# Patient Record
Sex: MALE | Race: WHITE | Employment: FULL TIME | URBAN - METROPOLITAN AREA
[De-identification: names, ages, dates, MRNs, and addresses within clinical notes are randomized per-mention and may not be internally consistent; named-entity substitution may affect disease eponyms.]

---

## 2016-01-24 LAB
LEFT VENTRICULAR EJECTION FRACTION MODE: NORMAL
LV EF: 55 %

## 2017-05-15 PROBLEM — N40.1 BENIGN NON-NODULAR PROSTATIC HYPERPLASIA WITH LOWER URINARY TRACT SYMPTOMS: Status: ACTIVE | Noted: 2017-05-15

## 2017-05-15 PROBLEM — K21.9 GASTROESOPHAGEAL REFLUX DISEASE WITHOUT ESOPHAGITIS: Chronic | Status: ACTIVE | Noted: 2017-05-15

## 2017-05-15 PROBLEM — I25.10 CORONARY ARTERY DISEASE INVOLVING NATIVE CORONARY ARTERY OF NATIVE HEART WITHOUT ANGINA PECTORIS: Chronic | Status: ACTIVE | Noted: 2017-05-15

## 2017-05-15 PROBLEM — R35.0 URINARY FREQUENCY: Status: ACTIVE | Noted: 2017-05-15

## 2017-05-15 PROBLEM — I10 ESSENTIAL HYPERTENSION: Chronic | Status: ACTIVE | Noted: 2017-05-15

## 2017-05-15 PROBLEM — E78.2 MIXED HYPERLIPIDEMIA: Chronic | Status: ACTIVE | Noted: 2017-05-15

## 2017-05-15 PROBLEM — R35.0 URINARY FREQUENCY: Status: RESOLVED | Noted: 2017-05-15 | Resolved: 2017-05-15

## 2018-03-19 ENCOUNTER — HOSPITAL ENCOUNTER (OUTPATIENT)
Age: 83
Discharge: HOME | End: 2018-03-21
Payer: MEDICARE

## 2018-03-19 ENCOUNTER — OFFICE VISIT (OUTPATIENT)
Dept: FAMILY MEDICINE CLINIC | Age: 83
End: 2018-03-19
Payer: MEDICARE

## 2018-03-19 VITALS
TEMPERATURE: 98.3 F | SYSTOLIC BLOOD PRESSURE: 162 MMHG | BODY MASS INDEX: 25.94 KG/M2 | OXYGEN SATURATION: 93 % | HEIGHT: 66 IN | HEART RATE: 62 BPM | WEIGHT: 161.4 LBS | DIASTOLIC BLOOD PRESSURE: 71 MMHG

## 2018-03-19 DIAGNOSIS — E78.2 MIXED HYPERLIPIDEMIA: Chronic | ICD-10-CM

## 2018-03-19 DIAGNOSIS — I25.10 CORONARY ARTERY DISEASE INVOLVING NATIVE CORONARY ARTERY OF NATIVE HEART WITHOUT ANGINA PECTORIS: Primary | Chronic | ICD-10-CM

## 2018-03-19 DIAGNOSIS — I10 ESSENTIAL HYPERTENSION: Chronic | ICD-10-CM

## 2018-03-19 DIAGNOSIS — K21.9 GASTROESOPHAGEAL REFLUX DISEASE WITHOUT ESOPHAGITIS: Chronic | ICD-10-CM

## 2018-03-19 LAB
ANION GAP SERPL CALCULATED.3IONS-SCNC: 13 MMOL/L (ref 7–16)
BUN BLDV-MCNC: 22 MG/DL (ref 8–23)
CALCIUM SERPL-MCNC: 8.8 MG/DL (ref 8.6–10.2)
CHLORIDE BLD-SCNC: 103 MMOL/L (ref 98–107)
CO2: 24 MMOL/L (ref 22–29)
CREAT SERPL-MCNC: 1.3 MG/DL (ref 0.7–1.2)
GFR AFRICAN AMERICAN: >60
GFR NON-AFRICAN AMERICAN: 53 ML/MIN/1.73
GLUCOSE BLD-MCNC: 106 MG/DL (ref 74–109)
POTASSIUM SERPL-SCNC: 4.6 MMOL/L (ref 3.5–5)
SODIUM BLD-SCNC: 140 MMOL/L (ref 132–146)

## 2018-03-19 PROCEDURE — 99214 OFFICE O/P EST MOD 30 MIN: CPT | Performed by: FAMILY MEDICINE

## 2018-03-19 PROCEDURE — 99212 OFFICE O/P EST SF 10 MIN: CPT | Performed by: FAMILY MEDICINE

## 2018-03-19 PROCEDURE — 36415 COLL VENOUS BLD VENIPUNCTURE: CPT

## 2018-03-19 PROCEDURE — 80048 BASIC METABOLIC PNL TOTAL CA: CPT

## 2018-03-19 NOTE — PROGRESS NOTES
[facility]   Ambulatory visit note  DATE OF VISIT : 3/19/2018    Patient : Jaime Fish   Sex : male   Age : 80 y.o.  : 1935   MRN : <[id number]>            Chief Complaint :   Chief Complaint   Patient presents with    Coronary Artery Disease     f/u from        HPI:  Jaime Fish presents to the office today for evaluation of Hyperlipidemia, Coronary Artery Disease, HTN    Current status:      Symptoms related to above problems: No    Tolerating medication:   Yes   Patient does not report new complaints today. BP good at home. Occasional symptoms of GERD related to diet. Takes ranitidine BID    Past Medical History :  has a past medical history of Arthritis; CAD (coronary artery disease); Coronary artery disease involving native coronary artery of native heart without angina pectoris; GERD (gastroesophageal reflux disease); Hyperlipidemia; Hypertension; and Myocardial infarct, old. Past Surgical history :    Past Surgical History:   Procedure Laterality Date    APPENDECTOMY      CORONARY ANGIOPLASTY WITH STENT PLACEMENT Left     2 placed in  and one replacement after a couple of years due to stent clogging at [facility]       Family Medical History :   Family History   Problem Relation Age of Onset    Cancer Mother     High Blood Pressure Mother     Heart Disease Mother     High Cholesterol Mother     Cirrhosis Father        Social History :   Social History     Social History    Marital status:      Spouse name: N/A    Number of children: N/A    Years of education: N/A     Occupational History    Not on file.      Social History Main Topics    Smoking status: Never Smoker    Smokeless tobacco: Never Used    Alcohol use No    Drug use: No    Sexual activity: Not Currently     Other Topics Concern    Not on file     Social History Narrative    No narrative on file        Current Medications    Current Outpatient Prescriptions on File Prior to Visit   Medication Sig Dispense Refill    hydrochlorothiazide (HYDRODIURIL) 12.5 MG tablet Take 1 tablet by mouth daily 30 tablet 3    metoprolol (LOPRESSOR) 100 MG tablet Take 100 mg by mouth 2 times daily      benazepril (LOTENSIN) 20 MG tablet Take 20 mg by mouth 2 times daily      clopidogrel (PLAVIX) 75 MG tablet Take 75 mg by mouth daily      isosorbide mononitrate (IMDUR) 60 MG extended release tablet Take 60 mg by mouth daily      atorvastatin (LIPITOR) 40 MG tablet Take 40 mg by mouth daily      aspirin 81 MG tablet Take 81 mg by mouth daily      niacin 500 MG extended release capsule Take 500 mg by mouth nightly      Omega-3 Fatty Acids (FISH OIL) 500 MG CAPS Take 1 capsule by mouth 2 times daily      ranitidine (ZANTAC) 150 MG tablet Take 150 mg by mouth 2 times daily      diltiazem (CARDIZEM) 120 MG tablet Take 120 mg by mouth daily       No current facility-administered medications on file prior to visit. Allergies : No Known Allergies    Review of Systems :    Review of Systems - History obtained from the patient  General ROS: negative  Respiratory ROS: no cough, shortness of breath, or wheezing  Cardiovascular ROS: no chest pain or dyspnea on exertion  Gastrointestinal ROS: no abdominal pain, change in bowel habits, or black or bloody stools  Genito-Urinary ROS: no dysuria, trouble voiding, or hematuria  Neurological ROS: negative for - dizziness or headaches    Physical Exam :    VITAL SIGNS : Blood pressure (!) 162/71, pulse 62, temperature 98.3 °F (36.8 °C), temperature source Oral, height 5' 6\" (1.676 m), weight 161 lb 6.4 oz (73.2 kg), SpO2 93 %. GENERAL APPEARANCE : NAD, cooperative, appears stated age  NECK : Supple, trachea midline, no thyromegaly, full ROM  LUNGS : Clear to auscultation, without wheezes, rales, or rhonchi. Normal chest expansion.   HEART : RRR, normal S1/S2, no murmurs, gallops, or abnormal sounds  EXTREMITIES : No peripheral edema, no clubbing, no cyanosis  PSYCHIATRIC : Appropriate affect, AAO X 3         Assessment & Plan :    Hanny Claire was seen today for coronary artery disease. Diagnoses and all orders for this visit:    Coronary artery disease involving native coronary artery of native heart without angina pectoris    Gastroesophageal reflux disease without esophagitis    Essential hypertension  -     Basic Metabolic Panel; Future    Mixed hyperlipidemia        Health Maintenance     Health Maintenance Due   Topic Date Due    DTaP/Tdap/Td vaccine (1 - Tdap) 02/07/1954    Shingles Vaccine (1 of 2 - 2 Dose Series) 02/07/1985       Other plans: Continue all meds. He monitors BP and readings are generally normal.  I will hold off on increasing meds for now. He does have a cardiology appointment soon. Reviewed indicated health maintenance and answered questions as needed. Advised patient of risks of foregoing needed health maintenance. Patient advised of all findings and recommendations. Questions answered, and instructions provided where indicated, including reasons for contacting office or returning sooner than requested. Reviewed indications and potential side-effects of medications, including any new medications ordered today.     Future Appointments  Date Time Provider Department Center   6/14/2018 9:20 AM MD Dione Faust MD

## 2018-06-14 ENCOUNTER — HOSPITAL ENCOUNTER (OUTPATIENT)
Age: 83
Discharge: HOME | End: 2018-06-16
Payer: MEDICARE

## 2018-06-14 ENCOUNTER — OFFICE VISIT (OUTPATIENT)
Dept: FAMILY MEDICINE CLINIC | Age: 83
End: 2018-06-14
Payer: MEDICARE

## 2018-06-14 VITALS
DIASTOLIC BLOOD PRESSURE: 89 MMHG | HEART RATE: 62 BPM | WEIGHT: 159 LBS | BODY MASS INDEX: 25.55 KG/M2 | SYSTOLIC BLOOD PRESSURE: 184 MMHG | HEIGHT: 66 IN | TEMPERATURE: 98 F | OXYGEN SATURATION: 96 %

## 2018-06-14 DIAGNOSIS — I25.10 CORONARY ARTERY DISEASE INVOLVING NATIVE CORONARY ARTERY OF NATIVE HEART WITHOUT ANGINA PECTORIS: Primary | Chronic | ICD-10-CM

## 2018-06-14 DIAGNOSIS — I10 ESSENTIAL HYPERTENSION: Chronic | ICD-10-CM

## 2018-06-14 DIAGNOSIS — Z23 NEED FOR TDAP VACCINATION: ICD-10-CM

## 2018-06-14 DIAGNOSIS — E78.2 MIXED HYPERLIPIDEMIA: Chronic | ICD-10-CM

## 2018-06-14 DIAGNOSIS — Z23 NEED FOR ZOSTER VACCINATION: ICD-10-CM

## 2018-06-14 LAB
ALBUMIN SERPL-MCNC: 4.3 G/DL (ref 3.5–5.2)
ALP BLD-CCNC: 85 U/L (ref 40–129)
ALT SERPL-CCNC: 21 U/L (ref 0–40)
ANION GAP SERPL CALCULATED.3IONS-SCNC: 15 MMOL/L (ref 7–16)
AST SERPL-CCNC: 30 U/L (ref 0–39)
BILIRUB SERPL-MCNC: 0.7 MG/DL (ref 0–1.2)
BUN BLDV-MCNC: 23 MG/DL (ref 8–23)
CALCIUM SERPL-MCNC: 9.2 MG/DL (ref 8.6–10.2)
CHLORIDE BLD-SCNC: 104 MMOL/L (ref 98–107)
CHOLESTEROL, TOTAL: 162 MG/DL (ref 0–199)
CO2: 24 MMOL/L (ref 22–29)
CREAT SERPL-MCNC: 1.3 MG/DL (ref 0.7–1.2)
GFR AFRICAN AMERICAN: >60
GFR NON-AFRICAN AMERICAN: 53 ML/MIN/1.73
GLUCOSE BLD-MCNC: 105 MG/DL (ref 74–109)
HDLC SERPL-MCNC: 48 MG/DL
LDL CHOLESTEROL CALCULATED: 86 MG/DL (ref 0–99)
POTASSIUM SERPL-SCNC: 4.6 MMOL/L (ref 3.5–5)
SODIUM BLD-SCNC: 143 MMOL/L (ref 132–146)
TOTAL PROTEIN: 7 G/DL (ref 6.4–8.3)
TRIGL SERPL-MCNC: 142 MG/DL (ref 0–149)
VLDLC SERPL CALC-MCNC: 28 MG/DL

## 2018-06-14 PROCEDURE — 99214 OFFICE O/P EST MOD 30 MIN: CPT | Performed by: FAMILY MEDICINE

## 2018-06-14 PROCEDURE — 80061 LIPID PANEL: CPT

## 2018-06-14 PROCEDURE — 36415 COLL VENOUS BLD VENIPUNCTURE: CPT | Performed by: FAMILY MEDICINE

## 2018-06-14 PROCEDURE — 80053 COMPREHEN METABOLIC PANEL: CPT

## 2018-07-02 ENCOUNTER — OFFICE VISIT (OUTPATIENT)
Dept: CARDIOLOGY CLINIC | Age: 83
End: 2018-07-02
Payer: MEDICARE

## 2018-07-02 VITALS
RESPIRATION RATE: 16 BRPM | BODY MASS INDEX: 25.23 KG/M2 | HEART RATE: 58 BPM | SYSTOLIC BLOOD PRESSURE: 130 MMHG | HEIGHT: 66 IN | WEIGHT: 157 LBS | DIASTOLIC BLOOD PRESSURE: 60 MMHG

## 2018-07-02 DIAGNOSIS — I25.10 CORONARY ARTERY DISEASE INVOLVING NATIVE CORONARY ARTERY OF NATIVE HEART WITHOUT ANGINA PECTORIS: Primary | ICD-10-CM

## 2018-07-02 DIAGNOSIS — I10 ESSENTIAL HYPERTENSION: ICD-10-CM

## 2018-07-02 DIAGNOSIS — E78.00 PURE HYPERCHOLESTEROLEMIA: ICD-10-CM

## 2018-07-02 DIAGNOSIS — I44.0 FIRST-DEGREE ATRIOVENTRICULAR BLOCK: ICD-10-CM

## 2018-07-02 PROCEDURE — 93000 ELECTROCARDIOGRAM COMPLETE: CPT | Performed by: INTERNAL MEDICINE

## 2018-07-02 PROCEDURE — 99205 OFFICE O/P NEW HI 60 MIN: CPT | Performed by: INTERNAL MEDICINE

## 2018-07-02 RX ORDER — BENAZEPRIL HYDROCHLORIDE 20 MG/1
20 TABLET ORAL 2 TIMES DAILY
Qty: 90 TABLET | Refills: 3 | Status: SHIPPED | OUTPATIENT
Start: 2018-07-02 | End: 2018-10-08 | Stop reason: SDUPTHER

## 2018-07-02 NOTE — PATIENT INSTRUCTIONS
trans fat  · Read food labels, and try to avoid saturated and trans fats. They increase your risk of heart disease. Trans fat is found in many processed foods such as cookies and crackers. · Use olive or canola oil when you cook. Try cholesterol-lowering spreads, such as Benecol or Take Control. · Bake, broil, grill, or steam foods instead of frying them. · Choose lean meats instead of high-fat meats such as hot dogs and sausages. Cut off all visible fat when you prepare meat. · Eat fish, skinless poultry, and meat alternatives such as soy products instead of high-fat meats. Soy products, such as tofu, may be especially good for your heart. · Choose low-fat or fat-free milk and dairy products. Eat fish  · Eat at least two servings of fish a week. Certain fish, such as salmon and tuna, contain omega-3 fatty acids, which may help reduce your risk of heart attack. Eat foods high in fiber  · Eat a variety of grain products every day. Include whole-grain foods that have lots of fiber and nutrients. Examples of whole-grain foods include oats, whole wheat bread, and brown rice. · Buy whole-grain breads and cereals, instead of white bread or pastries. Limit salt and sodium  · Limit how much salt and sodium you eat to help lower your blood pressure. · Taste food before you salt it. Add only a little salt when you think you need it. With time, your taste buds will adjust to less salt. · Eat fewer snack items, fast foods, and other high-salt, processed foods. Check food labels for the amount of sodium in packaged foods. · Choose low-sodium versions of canned goods (such as soups, vegetables, and beans). Limit sugar  · Limit drinks and foods with added sugar. These include candy, desserts, and soda pop. Limit alcohol  · Limit alcohol to no more than 2 drinks a day for men and 1 drink a day for women. Too much alcohol can cause health problems. When should you call for help?   Watch closely for changes in your [employer], and be sure to contact your doctor if:    · You would like help planning heart-healthy meals. Where can you learn more? Go to [email or web address] and sign in to your [employer] account. Enter V137 in the [employer] box to learn more about \"Heart-Healthy Diet: Care Instructions. \"     If you do not have an account, please click on the \"Sign Up Now\" link. Current as of: December 6, 2017  Content Version: 11.6  © 6965-4001 [employer]. Care instructions adapted under license by [facility] ([facility]). If you have questions about a medical condition or this instruction, always ask your healthcare professional. [address] any warranty or liability for your use of this information.

## 2018-07-23 RX ORDER — RANITIDINE 150 MG/1
150 TABLET ORAL 2 TIMES DAILY
Qty: 60 TABLET | Refills: 2 | Status: SHIPPED | OUTPATIENT
Start: 2018-07-23 | End: 2018-10-08 | Stop reason: SDUPTHER

## 2018-08-20 ENCOUNTER — OFFICE VISIT (OUTPATIENT)
Dept: FAMILY MEDICINE CLINIC | Age: 83
End: 2018-08-20
Payer: MEDICARE

## 2018-08-20 VITALS
DIASTOLIC BLOOD PRESSURE: 63 MMHG | OXYGEN SATURATION: 95 % | HEIGHT: 66 IN | SYSTOLIC BLOOD PRESSURE: 126 MMHG | BODY MASS INDEX: 25.07 KG/M2 | TEMPERATURE: 98.7 F | HEART RATE: 50 BPM | WEIGHT: 156 LBS | RESPIRATION RATE: 16 BRPM

## 2018-08-20 DIAGNOSIS — I10 ESSENTIAL HYPERTENSION: Chronic | ICD-10-CM

## 2018-08-20 PROCEDURE — 99213 OFFICE O/P EST LOW 20 MIN: CPT | Performed by: FAMILY MEDICINE

## 2018-08-20 PROCEDURE — 99212 OFFICE O/P EST SF 10 MIN: CPT | Performed by: FAMILY MEDICINE

## 2018-08-20 RX ORDER — OMEGA-3/DHA/EPA/FISH OIL 60 MG-90MG
1 CAPSULE ORAL 2 TIMES DAILY
COMMUNITY
End: 2020-06-08

## 2018-08-20 ASSESSMENT — PATIENT HEALTH QUESTIONNAIRE - PHQ9
SUM OF ALL RESPONSES TO PHQ QUESTIONS 1-9: 0
SUM OF ALL RESPONSES TO PHQ9 QUESTIONS 1 & 2: 0
2. FEELING DOWN, DEPRESSED OR HOPELESS: 0
SUM OF ALL RESPONSES TO PHQ QUESTIONS 1-9: 0
1. LITTLE INTEREST OR PLEASURE IN DOING THINGS: 0

## 2018-08-20 NOTE — PROGRESS NOTES
S: 80 y.o. male here for fatigue. Not feeling like himself for the past 2 wks. More HAs. Bandlike and occipital. Dizziness, and BP and HR low at these times. Cards in July seen and EKG showed 1st deg AV block w/ rashmi. Not drinking much water. No new stressors aside from wife not wanting intercourse lately. Daughter w/ breast cancer. BPs w/ home BP cuff may read 10 points higher systolic. On cardizem. Previous Dx of vertigo but none recently. Some nausea, no vomiting. No fever or diarrhea. Otherwise neg ROS. O: VS: /63 Comment: home BP cuff  Pulse 50   Temp 98.7 °F (37.1 °C) (Oral)   Resp 16   Ht 5' 6\" (1.676 m)   Wt 156 lb (70.8 kg)   SpO2 95%   BMI 25.18 kg/m²    General: NAD, alert and interacting appropriately. CV:  RRR, no gallops, rubs, or murmurs    Resp: CTAB    Abd:  Soft, nontender   Ext:  No edema    Impression: fatigue 2/2 depression vs dehydration vs bradycardia  Plan:   Increase hydration  Stop cardizem given rashmi and heart block and sxs  rtc 10-14 days for full depression screening    Attending Physician Statement  I have discussed the case, including pertinent history and exam findings with the resident. I agree with the documented assessment and plan.

## 2018-08-22 ASSESSMENT — ENCOUNTER SYMPTOMS
COUGH: 0
ABDOMINAL PAIN: 0
VOMITING: 0
SHORTNESS OF BREATH: 0
CONSTIPATION: 0
SORE THROAT: 0
NAUSEA: 1
SINUS PAIN: 0
DIARRHEA: 0

## 2018-08-23 NOTE — PROGRESS NOTES
CATHETERIZATION  02/05/2016    patent stents, LM 20% ostial, Cx luminal irregularities--medical management    CARDIOVASCULAR STRESS TEST  10/03/2011    nuclear    CARDIOVASCULAR STRESS TEST  01/25/2016    CORONARY ANGIOPLASTY WITH STENT PLACEMENT Left 1999    stent to RCA  @ [facility]    TRANSTHORACIC ECHOCARDIOGRAM  01/24/2016    EF 55%, 1+TR & 1+ MR       Allergies:    Patient has no known allergies. Social History:   Social History     Social History    Marital status:      Spouse name: N/A    Number of children: N/A    Years of education: N/A     Occupational History    Not on file. Social History Main Topics    Smoking status: Never Smoker    Smokeless tobacco: Never Used    Alcohol use No    Drug use: No    Sexual activity: Not Currently     Other Topics Concern    Not on file     Social History Narrative    Denies caffeine        Family History:   Family History   Problem Relation Age of Onset    Cancer Mother     High Blood Pressure Mother     High Cholesterol Mother     Cirrhosis Father        Review of Systems:   Review of Systems   Constitutional: Negative for chills and fever. HENT: Negative for congestion, ear discharge, ear pain, sinus pain and sore throat. Respiratory: Negative for cough and shortness of breath. Cardiovascular: Negative for chest pain, palpitations and leg swelling. Gastrointestinal: Positive for nausea. Negative for abdominal pain, constipation, diarrhea and vomiting. Genitourinary: Negative for dysuria and urgency. Neurological: Positive for dizziness and headaches. Negative for loss of consciousness. Psychiatric/Behavioral: Negative for depression and suicidal ideas.        Physical Exam   Vitals: /63 Comment: home BP cuff  Pulse 50   Temp 98.7 °F (37.1 °C) (Oral)   Resp 16   Ht 5' 6\" (1.676 m)   Wt 156 lb (70.8 kg)   SpO2 95%   BMI 25.18 kg/m²   General Appearance: Well developed, awake, alert, oriented, no acute tablet Take 60 mg by mouth daily      atorvastatin (LIPITOR) 40 MG tablet Take 40 mg by mouth daily      aspirin 81 MG tablet Take 81 mg by mouth daily       No current facility-administered medications for this visit.

## 2018-08-29 ENCOUNTER — OFFICE VISIT (OUTPATIENT)
Dept: FAMILY MEDICINE CLINIC | Age: 83
End: 2018-08-29
Payer: MEDICARE

## 2018-08-29 VITALS
OXYGEN SATURATION: 94 % | WEIGHT: 157 LBS | HEART RATE: 61 BPM | SYSTOLIC BLOOD PRESSURE: 151 MMHG | TEMPERATURE: 98.5 F | BODY MASS INDEX: 25.23 KG/M2 | HEIGHT: 66 IN | DIASTOLIC BLOOD PRESSURE: 67 MMHG

## 2018-08-29 DIAGNOSIS — N39.41 URGE INCONTINENCE OF URINE: Primary | ICD-10-CM

## 2018-08-29 PROCEDURE — 99213 OFFICE O/P EST LOW 20 MIN: CPT | Performed by: FAMILY MEDICINE

## 2018-08-29 PROCEDURE — 99212 OFFICE O/P EST SF 10 MIN: CPT | Performed by: FAMILY MEDICINE

## 2018-08-29 NOTE — PATIENT INSTRUCTIONS
under license by [facility] ([facility]). If you have questions about a medical condition or this instruction, always ask your healthcare professional. [place] [address] any warranty or liability for your use of this information. STOP DRINKING FLUIDS 2-3 HOURS BEFORE BED. AVOID DRINKING JUICE, POP, COFFEE OR TEA BEFORE BED.

## 2018-09-03 ASSESSMENT — ENCOUNTER SYMPTOMS
COUGH: 0
ABDOMINAL PAIN: 0
VOMITING: 0
SHORTNESS OF BREATH: 0
NAUSEA: 0
BLURRED VISION: 0
DOUBLE VISION: 0
PHOTOPHOBIA: 0

## 2018-09-04 RX ORDER — METOPROLOL TARTRATE 100 MG/1
100 TABLET ORAL 2 TIMES DAILY
Qty: 60 TABLET | Refills: 5 | Status: SHIPPED | OUTPATIENT
Start: 2018-09-04 | End: 2019-01-17 | Stop reason: SDUPTHER

## 2018-09-14 ENCOUNTER — HOSPITAL ENCOUNTER (OUTPATIENT)
Dept: ULTRASOUND IMAGING | Age: 83
Discharge: HOME | End: 2018-09-16
Payer: MEDICARE

## 2018-09-14 DIAGNOSIS — N39.41 URGE INCONTINENCE OF URINE: ICD-10-CM

## 2018-09-14 PROCEDURE — 76775 US EXAM ABDO BACK WALL LIM: CPT

## 2018-09-19 ENCOUNTER — TELEPHONE (OUTPATIENT)
Dept: FAMILY MEDICINE CLINIC | Age: 83
End: 2018-09-19

## 2018-10-08 ENCOUNTER — OFFICE VISIT (OUTPATIENT)
Dept: FAMILY MEDICINE CLINIC | Age: 83
End: 2018-10-08
Payer: MEDICARE

## 2018-10-08 VITALS
TEMPERATURE: 98.4 F | DIASTOLIC BLOOD PRESSURE: 88 MMHG | SYSTOLIC BLOOD PRESSURE: 158 MMHG | OXYGEN SATURATION: 95 % | HEART RATE: 60 BPM | WEIGHT: 156 LBS | RESPIRATION RATE: 16 BRPM | BODY MASS INDEX: 25.07 KG/M2 | HEIGHT: 66 IN

## 2018-10-08 DIAGNOSIS — Z23 NEED FOR PNEUMOCOCCAL VACCINATION: Primary | ICD-10-CM

## 2018-10-08 DIAGNOSIS — K21.9 GASTROESOPHAGEAL REFLUX DISEASE WITHOUT ESOPHAGITIS: Chronic | ICD-10-CM

## 2018-10-08 DIAGNOSIS — I10 ESSENTIAL HYPERTENSION: Chronic | ICD-10-CM

## 2018-10-08 DIAGNOSIS — E78.2 MIXED HYPERLIPIDEMIA: Chronic | ICD-10-CM

## 2018-10-08 DIAGNOSIS — I25.10 CORONARY ARTERY DISEASE INVOLVING NATIVE CORONARY ARTERY OF NATIVE HEART WITHOUT ANGINA PECTORIS: Chronic | ICD-10-CM

## 2018-10-08 PROCEDURE — 99212 OFFICE O/P EST SF 10 MIN: CPT | Performed by: FAMILY MEDICINE

## 2018-10-08 PROCEDURE — 90732 PPSV23 VACC 2 YRS+ SUBQ/IM: CPT

## 2018-10-08 PROCEDURE — G0009 ADMIN PNEUMOCOCCAL VACCINE: HCPCS

## 2018-10-08 PROCEDURE — 99214 OFFICE O/P EST MOD 30 MIN: CPT | Performed by: FAMILY MEDICINE

## 2018-10-08 PROCEDURE — 6360000002 HC RX W HCPCS

## 2018-10-08 RX ORDER — ATORVASTATIN CALCIUM 40 MG/1
40 TABLET, FILM COATED ORAL DAILY
Qty: 90 TABLET | Refills: 3 | Status: SHIPPED | OUTPATIENT
Start: 2018-10-08 | End: 2019-09-19 | Stop reason: SDUPTHER

## 2018-10-08 RX ORDER — BENAZEPRIL HYDROCHLORIDE 20 MG/1
20 TABLET ORAL 2 TIMES DAILY
Qty: 180 TABLET | Refills: 3 | Status: SHIPPED | OUTPATIENT
Start: 2018-10-08 | End: 2019-01-17 | Stop reason: SDUPTHER

## 2018-10-08 RX ORDER — ISOSORBIDE MONONITRATE 60 MG/1
60 TABLET, EXTENDED RELEASE ORAL DAILY
Qty: 90 TABLET | Refills: 3 | Status: SHIPPED | OUTPATIENT
Start: 2018-10-08 | End: 2019-09-19 | Stop reason: SDUPTHER

## 2018-10-08 RX ORDER — RANITIDINE 150 MG/1
150 TABLET ORAL 2 TIMES DAILY
Qty: 180 TABLET | Refills: 3 | Status: SHIPPED | OUTPATIENT
Start: 2018-10-08 | End: 2019-09-19 | Stop reason: SDUPTHER

## 2018-12-10 ENCOUNTER — TELEPHONE (OUTPATIENT)
Dept: ADMINISTRATIVE | Age: 83
End: 2018-12-10

## 2018-12-13 ENCOUNTER — HOSPITAL ENCOUNTER (OUTPATIENT)
Age: 83
Discharge: HOME | End: 2018-12-15
Payer: MEDICARE

## 2018-12-13 ENCOUNTER — OFFICE VISIT (OUTPATIENT)
Dept: FAMILY MEDICINE CLINIC | Age: 83
End: 2018-12-13
Payer: MEDICARE

## 2018-12-13 VITALS
TEMPERATURE: 98.6 F | DIASTOLIC BLOOD PRESSURE: 100 MMHG | WEIGHT: 157 LBS | HEART RATE: 66 BPM | RESPIRATION RATE: 20 BRPM | BODY MASS INDEX: 26.16 KG/M2 | SYSTOLIC BLOOD PRESSURE: 191 MMHG | HEIGHT: 65 IN

## 2018-12-13 DIAGNOSIS — I10 ESSENTIAL HYPERTENSION: Primary | Chronic | ICD-10-CM

## 2018-12-13 DIAGNOSIS — I10 ESSENTIAL HYPERTENSION: Chronic | ICD-10-CM

## 2018-12-13 DIAGNOSIS — I25.10 CORONARY ARTERY DISEASE INVOLVING NATIVE CORONARY ARTERY OF NATIVE HEART WITHOUT ANGINA PECTORIS: Chronic | ICD-10-CM

## 2018-12-13 LAB
ALBUMIN SERPL-MCNC: 4.7 G/DL (ref 3.5–5.2)
ALP BLD-CCNC: 84 U/L (ref 40–129)
ALT SERPL-CCNC: 21 U/L (ref 0–40)
ANION GAP SERPL CALCULATED.3IONS-SCNC: 9 MMOL/L (ref 7–16)
AST SERPL-CCNC: 25 U/L (ref 0–39)
BILIRUB SERPL-MCNC: 0.9 MG/DL (ref 0–1.2)
BUN BLDV-MCNC: 22 MG/DL (ref 8–23)
CALCIUM SERPL-MCNC: 9.5 MG/DL (ref 8.6–10.2)
CHLORIDE BLD-SCNC: 100 MMOL/L (ref 98–107)
CO2: 29 MMOL/L (ref 22–29)
CREAT SERPL-MCNC: 1.3 MG/DL (ref 0.7–1.2)
GFR AFRICAN AMERICAN: >60
GFR NON-AFRICAN AMERICAN: 53 ML/MIN/1.73
GLUCOSE BLD-MCNC: 90 MG/DL (ref 74–99)
POTASSIUM SERPL-SCNC: 5 MMOL/L (ref 3.5–5)
SODIUM BLD-SCNC: 138 MMOL/L (ref 132–146)
TOTAL PROTEIN: 7.4 G/DL (ref 6.4–8.3)

## 2018-12-13 PROCEDURE — 36415 COLL VENOUS BLD VENIPUNCTURE: CPT | Performed by: FAMILY MEDICINE

## 2018-12-13 PROCEDURE — 80053 COMPREHEN METABOLIC PANEL: CPT

## 2018-12-13 PROCEDURE — 99213 OFFICE O/P EST LOW 20 MIN: CPT | Performed by: FAMILY MEDICINE

## 2018-12-13 PROCEDURE — 99212 OFFICE O/P EST SF 10 MIN: CPT | Performed by: FAMILY MEDICINE

## 2018-12-13 PROCEDURE — 36415 COLL VENOUS BLD VENIPUNCTURE: CPT

## 2018-12-13 RX ORDER — HYDROCHLOROTHIAZIDE 12.5 MG/1
12.5 CAPSULE, GELATIN COATED ORAL EVERY MORNING
Qty: 30 CAPSULE | Refills: 1 | Status: SHIPPED | OUTPATIENT
Start: 2018-12-13 | End: 2019-01-17 | Stop reason: SDUPTHER

## 2018-12-13 NOTE — PROGRESS NOTES
chest expansion. HEART : RRR, normal S1/S2, no murmurs, gallops, or abnormal sounds  EXTREMITIES : No peripheral edema, no clubbing, no cyanosis  SKIN : Warm and dry, no rash or abnormal skin lesions noted  PSYCHIATRIC : Appropriate affect, AAO X 3         Assessment & Plan :    Bianca Flores was seen today for hypertension. Diagnoses and all orders for this visit:    Essential hypertension  -     Comprehensive Metabolic Panel; Future  -     hydrochlorothiazide (MICROZIDE) 12.5 MG capsule; Take 1 capsule by mouth every morning    Coronary artery disease involving native coronary artery of native heart without angina pectoris        Health Maintenance     Health Maintenance Due   Topic Date Due    Shingles Vaccine (1 of 2 - 2 Dose Series) 12/15/2016       Other plans: Will try low dose HCTZ. Warned of potential SE's   Continue to monitor at home and recheck in 1 month   Consider retrial with amlodipine if not effective    Reviewed indicated health maintenance and answered questions as needed. Advised patient of risks of foregoing needed health maintenance. Patient advised of all findings and recommendations. Questions answered, and instructions provided where indicated, including reasons for contacting office or returning sooner than requested. Reviewed indications and potential side-effects of medications, including any new medications ordered today.     Future Appointments  Date Time Provider Department Center   1/17/2019 9:20 AM MD Nadir Lacey MD

## 2019-01-17 ENCOUNTER — HOSPITAL ENCOUNTER (OUTPATIENT)
Age: 84
Discharge: HOME | End: 2019-01-19
Payer: MEDICARE

## 2019-01-17 ENCOUNTER — OFFICE VISIT (OUTPATIENT)
Dept: FAMILY MEDICINE CLINIC | Age: 84
End: 2019-01-17
Payer: MEDICARE

## 2019-01-17 VITALS
DIASTOLIC BLOOD PRESSURE: 67 MMHG | HEART RATE: 61 BPM | BODY MASS INDEX: 25.07 KG/M2 | HEIGHT: 66 IN | TEMPERATURE: 98 F | SYSTOLIC BLOOD PRESSURE: 125 MMHG | OXYGEN SATURATION: 95 % | WEIGHT: 156 LBS

## 2019-01-17 DIAGNOSIS — I73.00 RAYNAUD'S DISEASE WITHOUT GANGRENE: ICD-10-CM

## 2019-01-17 DIAGNOSIS — I10 ESSENTIAL HYPERTENSION: Chronic | ICD-10-CM

## 2019-01-17 DIAGNOSIS — Z23 NEED FOR SHINGLES VACCINE: Primary | ICD-10-CM

## 2019-01-17 LAB
ANION GAP SERPL CALCULATED.3IONS-SCNC: 11 MMOL/L (ref 7–16)
BUN BLDV-MCNC: 23 MG/DL (ref 8–23)
CALCIUM SERPL-MCNC: 9.2 MG/DL (ref 8.6–10.2)
CHLORIDE BLD-SCNC: 102 MMOL/L (ref 98–107)
CO2: 28 MMOL/L (ref 22–29)
CREAT SERPL-MCNC: 1.4 MG/DL (ref 0.7–1.2)
GFR AFRICAN AMERICAN: 58
GFR NON-AFRICAN AMERICAN: 48 ML/MIN/1.73
GLUCOSE BLD-MCNC: 120 MG/DL (ref 74–99)
POTASSIUM SERPL-SCNC: 4.8 MMOL/L (ref 3.5–5)
SODIUM BLD-SCNC: 141 MMOL/L (ref 132–146)

## 2019-01-17 PROCEDURE — 36415 COLL VENOUS BLD VENIPUNCTURE: CPT

## 2019-01-17 PROCEDURE — 36415 COLL VENOUS BLD VENIPUNCTURE: CPT | Performed by: FAMILY MEDICINE

## 2019-01-17 PROCEDURE — 99212 OFFICE O/P EST SF 10 MIN: CPT | Performed by: FAMILY MEDICINE

## 2019-01-17 PROCEDURE — 80048 BASIC METABOLIC PNL TOTAL CA: CPT

## 2019-01-17 PROCEDURE — 99213 OFFICE O/P EST LOW 20 MIN: CPT | Performed by: FAMILY MEDICINE

## 2019-01-17 RX ORDER — HYDROCHLOROTHIAZIDE 12.5 MG/1
12.5 CAPSULE, GELATIN COATED ORAL EVERY MORNING
Qty: 90 CAPSULE | Refills: 3 | Status: SHIPPED | OUTPATIENT
Start: 2019-01-17 | End: 2019-09-19 | Stop reason: SDUPTHER

## 2019-01-17 RX ORDER — METOPROLOL TARTRATE 100 MG/1
100 TABLET ORAL 2 TIMES DAILY
Qty: 180 TABLET | Refills: 3 | Status: SHIPPED | OUTPATIENT
Start: 2019-01-17 | End: 2019-09-19 | Stop reason: SDUPTHER

## 2019-01-17 RX ORDER — BENAZEPRIL HYDROCHLORIDE 20 MG/1
20 TABLET ORAL 2 TIMES DAILY
Qty: 180 TABLET | Refills: 3 | Status: SHIPPED | OUTPATIENT
Start: 2019-01-17 | End: 2019-09-19 | Stop reason: SDUPTHER

## 2019-02-05 ENCOUNTER — OFFICE VISIT (OUTPATIENT)
Dept: FAMILY MEDICINE CLINIC | Age: 84
End: 2019-02-05
Payer: MEDICARE

## 2019-02-05 VITALS
OXYGEN SATURATION: 96 % | DIASTOLIC BLOOD PRESSURE: 77 MMHG | HEART RATE: 75 BPM | HEIGHT: 66 IN | SYSTOLIC BLOOD PRESSURE: 135 MMHG | WEIGHT: 156.5 LBS | BODY MASS INDEX: 25.15 KG/M2 | RESPIRATION RATE: 16 BRPM | TEMPERATURE: 98.1 F

## 2019-02-05 DIAGNOSIS — J06.9 VIRAL URI: Primary | ICD-10-CM

## 2019-02-05 PROCEDURE — 99213 OFFICE O/P EST LOW 20 MIN: CPT | Performed by: STUDENT IN AN ORGANIZED HEALTH CARE EDUCATION/TRAINING PROGRAM

## 2019-02-05 PROCEDURE — 99212 OFFICE O/P EST SF 10 MIN: CPT | Performed by: STUDENT IN AN ORGANIZED HEALTH CARE EDUCATION/TRAINING PROGRAM

## 2019-02-05 RX ORDER — FLUTICASONE PROPIONATE 50 MCG
2 SPRAY, SUSPENSION (ML) NASAL DAILY
Qty: 1 BOTTLE | Refills: 0 | Status: SHIPPED
Start: 2019-02-05 | End: 2020-06-08

## 2019-02-05 ASSESSMENT — ENCOUNTER SYMPTOMS
SINUS PAIN: 0
EYE DISCHARGE: 0
SINUS PRESSURE: 0
EYE REDNESS: 0
VOMITING: 0
SORE THROAT: 1
COUGH: 0
RHINORRHEA: 0
EYE ITCHING: 0
NAUSEA: 0
DIARRHEA: 0
SHORTNESS OF BREATH: 0

## 2019-07-02 ENCOUNTER — OFFICE VISIT (OUTPATIENT)
Dept: CARDIOLOGY CLINIC | Age: 84
End: 2019-07-02
Payer: MEDICARE

## 2019-07-02 VITALS
HEIGHT: 66 IN | WEIGHT: 155.6 LBS | SYSTOLIC BLOOD PRESSURE: 130 MMHG | HEART RATE: 58 BPM | RESPIRATION RATE: 16 BRPM | DIASTOLIC BLOOD PRESSURE: 60 MMHG | BODY MASS INDEX: 25.01 KG/M2

## 2019-07-02 DIAGNOSIS — E78.00 PURE HYPERCHOLESTEROLEMIA: ICD-10-CM

## 2019-07-02 DIAGNOSIS — I25.10 CORONARY ARTERY DISEASE INVOLVING NATIVE CORONARY ARTERY OF NATIVE HEART WITHOUT ANGINA PECTORIS: Primary | ICD-10-CM

## 2019-07-02 DIAGNOSIS — I10 ESSENTIAL HYPERTENSION: Chronic | ICD-10-CM

## 2019-07-02 DIAGNOSIS — I44.0 FIRST-DEGREE ATRIOVENTRICULAR BLOCK: ICD-10-CM

## 2019-07-02 PROCEDURE — 99214 OFFICE O/P EST MOD 30 MIN: CPT | Performed by: INTERNAL MEDICINE

## 2019-07-02 PROCEDURE — 93000 ELECTROCARDIOGRAM COMPLETE: CPT | Performed by: INTERNAL MEDICINE

## 2019-07-02 SDOH — HEALTH STABILITY: MENTAL HEALTH: HOW OFTEN DO YOU HAVE A DRINK CONTAINING ALCOHOL?: NEVER

## 2019-07-02 NOTE — PROGRESS NOTES
01/25/2016    CORONARY ANGIOPLASTY WITH STENT PLACEMENT Left 1999    stent to RCA  @ [facility]    TRANSTHORACIC ECHOCARDIOGRAM  01/24/2016    EF 55%, 1+TR & 1+ MR       Family History:  Family History   Problem Relation Age of Onset    Cancer Mother     High Blood Pressure Mother     High Cholesterol Mother     Cirrhosis Father        Social History:  Social History     Socioeconomic History    Marital status:      Spouse name: Not on file    Number of children: Not on file    Years of education: Not on file    Highest education level: Not on file   Occupational History    Not on file   Social Needs    Financial resource strain: Not on file    Food insecurity:     Worry: Not on file     Inability: Not on file    Transportation needs:     Medical: Not on file     Non-medical: Not on file   Tobacco Use    Smoking status: Never Smoker    Smokeless tobacco: Never Used   Substance and Sexual Activity    Alcohol use: Never     Frequency: Never    Drug use: Never    Sexual activity: Not Currently     Partners: Female   Lifestyle    Physical activity:     Days per week: Not on file     Minutes per session: Not on file    Stress: Not on file   Relationships    Social connections:     Talks on phone: Not on file     Gets together: Not on file     Attends Alevism service: Not on file     Active member of club or organization: Not on file     Attends meetings of clubs or organizations: Not on file     Relationship status: Not on file    Intimate partner violence:     Fear of current or ex partner: Not on file     Emotionally abused: Not on file     Physically abused: Not on file     Forced sexual activity: Not on file   Other Topics Concern    Not on file   Social History Narrative    Drinks 1 cup of coffee daily.         Allergies:  No Known Allergies    Current Medications:  Current Outpatient Medications   Medication Sig Dispense Refill    fluticasone (FLONASE) 50 MCG/ACT nasal spray 2 sprays by

## 2019-09-19 ENCOUNTER — HOSPITAL ENCOUNTER (OUTPATIENT)
Age: 84
Discharge: HOME | End: 2019-09-21
Payer: MEDICARE

## 2019-09-19 ENCOUNTER — OFFICE VISIT (OUTPATIENT)
Dept: FAMILY MEDICINE CLINIC | Age: 84
End: 2019-09-19
Payer: MEDICARE

## 2019-09-19 VITALS
RESPIRATION RATE: 16 BRPM | WEIGHT: 156 LBS | TEMPERATURE: 98.2 F | HEIGHT: 65 IN | OXYGEN SATURATION: 96 % | DIASTOLIC BLOOD PRESSURE: 71 MMHG | BODY MASS INDEX: 25.99 KG/M2 | HEART RATE: 59 BPM | SYSTOLIC BLOOD PRESSURE: 131 MMHG

## 2019-09-19 DIAGNOSIS — Z23 NEEDS FLU SHOT: ICD-10-CM

## 2019-09-19 DIAGNOSIS — I10 ESSENTIAL HYPERTENSION: Chronic | ICD-10-CM

## 2019-09-19 DIAGNOSIS — I25.10 CORONARY ARTERY DISEASE INVOLVING NATIVE CORONARY ARTERY OF NATIVE HEART WITHOUT ANGINA PECTORIS: ICD-10-CM

## 2019-09-19 DIAGNOSIS — Z00.00 ROUTINE GENERAL MEDICAL EXAMINATION AT A HEALTH CARE FACILITY: ICD-10-CM

## 2019-09-19 DIAGNOSIS — E78.5 HYPERLIPIDEMIA, UNSPECIFIED HYPERLIPIDEMIA TYPE: ICD-10-CM

## 2019-09-19 LAB
ANION GAP SERPL CALCULATED.3IONS-SCNC: 12 MMOL/L (ref 7–16)
BUN BLDV-MCNC: 26 MG/DL (ref 8–23)
CALCIUM SERPL-MCNC: 9 MG/DL (ref 8.6–10.2)
CHLORIDE BLD-SCNC: 101 MMOL/L (ref 98–107)
CO2: 27 MMOL/L (ref 22–29)
CREAT SERPL-MCNC: 1.3 MG/DL (ref 0.7–1.2)
GFR AFRICAN AMERICAN: >60
GFR NON-AFRICAN AMERICAN: 53 ML/MIN/1.73
GLUCOSE BLD-MCNC: 133 MG/DL (ref 74–99)
POTASSIUM SERPL-SCNC: 4.6 MMOL/L (ref 3.5–5)
SODIUM BLD-SCNC: 140 MMOL/L (ref 132–146)

## 2019-09-19 PROCEDURE — 80048 BASIC METABOLIC PNL TOTAL CA: CPT

## 2019-09-19 PROCEDURE — 36415 COLL VENOUS BLD VENIPUNCTURE: CPT

## 2019-09-19 PROCEDURE — G0438 PPPS, INITIAL VISIT: HCPCS | Performed by: FAMILY MEDICINE

## 2019-09-19 PROCEDURE — 36415 COLL VENOUS BLD VENIPUNCTURE: CPT | Performed by: FAMILY MEDICINE

## 2019-09-19 PROCEDURE — 99212 OFFICE O/P EST SF 10 MIN: CPT | Performed by: FAMILY MEDICINE

## 2019-09-19 RX ORDER — BENAZEPRIL HYDROCHLORIDE 20 MG/1
20 TABLET ORAL 2 TIMES DAILY
Qty: 180 TABLET | Refills: 3 | Status: SHIPPED
Start: 2019-09-19 | End: 2020-10-12 | Stop reason: SDUPTHER

## 2019-09-19 RX ORDER — HYDROCHLOROTHIAZIDE 12.5 MG/1
12.5 CAPSULE, GELATIN COATED ORAL EVERY MORNING
Qty: 90 CAPSULE | Refills: 3 | Status: SHIPPED
Start: 2019-09-19 | End: 2020-02-10 | Stop reason: SDUPTHER

## 2019-09-19 RX ORDER — METOPROLOL TARTRATE 100 MG/1
100 TABLET ORAL 2 TIMES DAILY
Qty: 180 TABLET | Refills: 3 | Status: SHIPPED
Start: 2019-09-19 | End: 2020-02-10 | Stop reason: SDUPTHER

## 2019-09-19 RX ORDER — ATORVASTATIN CALCIUM 40 MG/1
40 TABLET, FILM COATED ORAL DAILY
Qty: 90 TABLET | Refills: 3 | Status: SHIPPED
Start: 2019-09-19 | End: 2020-11-20 | Stop reason: SDUPTHER

## 2019-09-19 RX ORDER — RANITIDINE 150 MG/1
150 TABLET ORAL 2 TIMES DAILY
Qty: 180 TABLET | Refills: 3 | Status: SHIPPED
Start: 2019-09-19 | End: 2020-04-29

## 2019-09-19 RX ORDER — ISOSORBIDE MONONITRATE 60 MG/1
60 TABLET, EXTENDED RELEASE ORAL DAILY
Qty: 90 TABLET | Refills: 3 | Status: SHIPPED
Start: 2019-09-19 | End: 2020-11-20 | Stop reason: SDUPTHER

## 2019-09-19 ASSESSMENT — LIFESTYLE VARIABLES: HOW OFTEN DO YOU HAVE A DRINK CONTAINING ALCOHOL: 0

## 2019-09-19 ASSESSMENT — PATIENT HEALTH QUESTIONNAIRE - PHQ9
SUM OF ALL RESPONSES TO PHQ QUESTIONS 1-9: 0
SUM OF ALL RESPONSES TO PHQ QUESTIONS 1-9: 0

## 2019-09-19 NOTE — PATIENT INSTRUCTIONS
Personalized Preventive Plan for Ericka Flor - 9/19/2019  Medicare offers a range of preventive health benefits. Some of the tests and screenings are paid in full while other may be subject to a deductible, co-insurance, and/or copay. Some of these benefits include a comprehensive review of your medical history including lifestyle, illnesses that may run in your family, and various assessments and screenings as appropriate. After reviewing your medical record and screening and assessments performed today your provider may have ordered immunizations, labs, imaging, and/or referrals for you. A list of these orders (if applicable) as well as your Preventive Care list are included within your After Visit Summary for your review. Other Preventive Recommendations:    · A preventive eye exam performed by an eye specialist is recommended every 1-2 years to screen for glaucoma; cataracts, macular degeneration, and other eye disorders. · A preventive dental visit is recommended every 6 months. · Try to get at least 150 minutes of exercise per week or 10,000 steps per day on a pedometer . · Order or download the FREE \"Exercise & Physical Activity: Your Everyday Guide\" from [employer]. Call 5-201.376.3646 or search [employer] online. · You need 9493-8846 mg of calcium and 5914-9515 IU of vitamin D per day. It is possible to meet your calcium requirement with diet alone, but a vitamin D supplement is usually necessary to meet this goal.  · When exposed to the sun, use a sunscreen that protects against both UVA and UVB radiation with an SPF of 30 or greater. Reapply every 2 to 3 hours or after sweating, drying off with a towel, or swimming. · Always wear a seat belt when traveling in a car. Always wear a helmet when riding a bicycle or motorcycle. Personalized Preventive Plan for Ericka Flor - 9/19/2019  Medicare offers a range of preventive health benefits.  Some

## 2019-09-19 NOTE — PROGRESS NOTES
rhonchi, normal air movement, no respiratory distress  Cardiovascular: normal rate, regular rhythm, normal S1 and S2, no murmurs, rubs, clicks, or gallops, distal pulses intact, no carotid bruits  Extremities: no cyanosis, clubbing or edema    Patient's complete Health Risk Assessment and screening values have been reviewed and are found in Flowsheets. The following problems were reviewed today and where indicated follow up appointments were made and/or referrals ordered. Positive Risk Factor Screenings with Interventions:     Cognitive: Words recalled: 2 Words Recalled  Clock Drawing Test (CDT) Score: (!) Abnormal  Total Score Interpretation: Positive Mini-Cog  Did the patient refuse to take the cognition test?: No  Cognitive Impairment Interventions:  · Patient declines any further evaluation/treatment for cognitive impairment    General Health:  General  In general, how would you say your health is?: Very Good  In the past 7 days, have you experienced any of the following?  New or Increased Pain, New or Increased Fatigue, Loneliness, Social Isolation, Stress or Anger?: None of These  Do you get the social and emotional support that you need?: Yes  Do you have a Living Will?: (!) No  General Health Risk Interventions:  · Pain issues: patient declines any further evaluation/treatment for this issue    Hearing/Vision:  No exam data present  Hearing/Vision  Do you or your family notice any trouble with your hearing?: (!) Yes  Do you have difficulty driving, watching TV, or doing any of your daily activities because of your eyesight?: No  Have you had an eye exam within the past year?: (!) No  Hearing/Vision Interventions:  · Hearing concerns:  patient declines any further evaluation/treatment for hearing issues    Safety:  Safety  Do you have working smoke detectors?: Yes  Have all throw rugs been removed or fastened?: Yes  Do you have non-slip mats or surfaces in all bathtubs/showers?: (!) No  Do all of your

## 2020-01-20 ENCOUNTER — OFFICE VISIT (OUTPATIENT)
Dept: FAMILY MEDICINE CLINIC | Age: 85
End: 2020-01-20
Payer: MEDICARE

## 2020-01-20 ENCOUNTER — HOSPITAL ENCOUNTER (OUTPATIENT)
Age: 85
Discharge: HOME | End: 2020-01-22
Payer: MEDICARE

## 2020-01-20 VITALS
SYSTOLIC BLOOD PRESSURE: 150 MMHG | OXYGEN SATURATION: 96 % | HEART RATE: 66 BPM | BODY MASS INDEX: 24.43 KG/M2 | RESPIRATION RATE: 16 BRPM | TEMPERATURE: 98.1 F | HEIGHT: 66 IN | DIASTOLIC BLOOD PRESSURE: 82 MMHG | WEIGHT: 152 LBS

## 2020-01-20 LAB
ALBUMIN SERPL-MCNC: 4.4 G/DL (ref 3.5–5.2)
ALP BLD-CCNC: 95 U/L (ref 40–129)
ALT SERPL-CCNC: 20 U/L (ref 0–40)
ANION GAP SERPL CALCULATED.3IONS-SCNC: 15 MMOL/L (ref 7–16)
AST SERPL-CCNC: 20 U/L (ref 0–39)
BILIRUB SERPL-MCNC: 0.7 MG/DL (ref 0–1.2)
BUN BLDV-MCNC: 34 MG/DL (ref 8–23)
CALCIUM SERPL-MCNC: 10 MG/DL (ref 8.6–10.2)
CHLORIDE BLD-SCNC: 97 MMOL/L (ref 98–107)
CHOLESTEROL, TOTAL: 170 MG/DL (ref 0–199)
CO2: 27 MMOL/L (ref 22–29)
CREAT SERPL-MCNC: 1.5 MG/DL (ref 0.7–1.2)
GFR AFRICAN AMERICAN: 54
GFR NON-AFRICAN AMERICAN: 44 ML/MIN/1.73
GLUCOSE BLD-MCNC: 162 MG/DL (ref 74–99)
HDLC SERPL-MCNC: 49 MG/DL
LDL CHOLESTEROL CALCULATED: 74 MG/DL (ref 0–99)
POTASSIUM SERPL-SCNC: 4.2 MMOL/L (ref 3.5–5)
SODIUM BLD-SCNC: 139 MMOL/L (ref 132–146)
TOTAL PROTEIN: 7.3 G/DL (ref 6.4–8.3)
TRIGL SERPL-MCNC: 234 MG/DL (ref 0–149)
TSH SERPL DL<=0.05 MIU/L-ACNC: 2.77 UIU/ML (ref 0.27–4.2)
VLDLC SERPL CALC-MCNC: 47 MG/DL

## 2020-01-20 PROCEDURE — 36415 COLL VENOUS BLD VENIPUNCTURE: CPT

## 2020-01-20 PROCEDURE — 80053 COMPREHEN METABOLIC PANEL: CPT

## 2020-01-20 PROCEDURE — 99212 OFFICE O/P EST SF 10 MIN: CPT | Performed by: FAMILY MEDICINE

## 2020-01-20 PROCEDURE — 84443 ASSAY THYROID STIM HORMONE: CPT

## 2020-01-20 PROCEDURE — 80061 LIPID PANEL: CPT

## 2020-01-20 PROCEDURE — 99214 OFFICE O/P EST MOD 30 MIN: CPT | Performed by: FAMILY MEDICINE

## 2020-02-10 RX ORDER — HYDROCHLOROTHIAZIDE 12.5 MG/1
12.5 CAPSULE, GELATIN COATED ORAL EVERY MORNING
Qty: 90 CAPSULE | Refills: 3 | Status: SHIPPED
Start: 2020-02-10 | End: 2020-10-12 | Stop reason: SDUPTHER

## 2020-02-10 RX ORDER — METOPROLOL TARTRATE 100 MG/1
100 TABLET ORAL 2 TIMES DAILY
Qty: 180 TABLET | Refills: 3 | Status: SHIPPED
Start: 2020-02-10 | End: 2020-12-14 | Stop reason: SDUPTHER

## 2020-03-10 ENCOUNTER — TELEPHONE (OUTPATIENT)
Dept: FAMILY MEDICINE CLINIC | Age: 85
End: 2020-03-10

## 2020-03-10 NOTE — TELEPHONE ENCOUNTER
Ed called in and would like you to give him a call concerning his wife Germán Schmitt. Please call him at [phone].   Thank you

## 2020-04-28 ENCOUNTER — TELEPHONE (OUTPATIENT)
Dept: FAMILY MEDICINE CLINIC | Age: 85
End: 2020-04-28

## 2020-04-29 RX ORDER — FAMOTIDINE 20 MG/1
20 TABLET, FILM COATED ORAL 2 TIMES DAILY
Qty: 60 TABLET | Refills: 3 | Status: SHIPPED
Start: 2020-04-29 | End: 2020-12-14

## 2020-05-20 NOTE — PATIENT INSTRUCTIONS
Patient Education        Learning About Living Luana Blanc  What is a living will? A living will is a legal form you use to write down the kind of care you want at the end of your life. It is used by the health professionals who will treat you if you aren't able to decide for yourself. If you put your wishes in writing, your loved ones and others will know what kind of care you want. They won't need to guess. This can ease your mind and be helpful to others. A living will is not the same as an estate or property will. An estate will explains what you want to happen with your money and property after you die. Is a living will a legal document? A living will is a legal document. Each state has its own laws about living vieyra. If you move to another state, make sure that your living will is legal in the state where you now live. Or you might use a universal form that has been approved by many states. This kind of form can sometimes be completed and stored online. Your electronic copy will then be available wherever you have a connection to the Internet. In most cases, doctors will respect your wishes even if you have a form from a different state. · You don't need an  to complete a living will. But legal advice can be helpful if your state's laws are unclear, your health history is complicated, or your family can't agree on what should be in your living will. · You can change your living will at any time. Some people find that their wishes about end-of-life care change as their health changes. · In addition to making a living will, think about completing a medical power of  form. This form lets you name the person you want to make end-of-life treatment decisions for you (your \"health care agent\") if you're not able to. Many hospitals and nursing homes will give you the forms you need to complete a living will and a medical power of .   · Your living will is used only if you can't make or Please advise

## 2020-06-08 ENCOUNTER — HOSPITAL ENCOUNTER (OUTPATIENT)
Age: 85
Discharge: HOME | End: 2020-06-10
Payer: MEDICARE

## 2020-06-08 ENCOUNTER — OFFICE VISIT (OUTPATIENT)
Dept: FAMILY MEDICINE CLINIC | Age: 85
End: 2020-06-08
Payer: MEDICARE

## 2020-06-08 VITALS
HEIGHT: 66 IN | HEART RATE: 64 BPM | SYSTOLIC BLOOD PRESSURE: 139 MMHG | TEMPERATURE: 98 F | BODY MASS INDEX: 24.43 KG/M2 | OXYGEN SATURATION: 95 % | WEIGHT: 152 LBS | DIASTOLIC BLOOD PRESSURE: 67 MMHG | RESPIRATION RATE: 16 BRPM

## 2020-06-08 PROBLEM — N18.30 CKD (CHRONIC KIDNEY DISEASE) STAGE 3, GFR 30-59 ML/MIN (HCC): Chronic | Status: ACTIVE | Noted: 2020-06-08

## 2020-06-08 PROBLEM — E78.00 PURE HYPERCHOLESTEROLEMIA: Status: RESOLVED | Noted: 2018-07-02 | Resolved: 2020-06-08

## 2020-06-08 PROCEDURE — 99212 OFFICE O/P EST SF 10 MIN: CPT | Performed by: FAMILY MEDICINE

## 2020-06-08 PROCEDURE — 36415 COLL VENOUS BLD VENIPUNCTURE: CPT

## 2020-06-08 PROCEDURE — 99214 OFFICE O/P EST MOD 30 MIN: CPT | Performed by: FAMILY MEDICINE

## 2020-06-08 PROCEDURE — 85025 COMPLETE CBC W/AUTO DIFF WBC: CPT

## 2020-06-08 PROCEDURE — 80053 COMPREHEN METABOLIC PANEL: CPT

## 2020-06-08 PROCEDURE — 36415 COLL VENOUS BLD VENIPUNCTURE: CPT | Performed by: FAMILY MEDICINE

## 2020-06-08 RX ORDER — NITROGLYCERIN 0.4 MG/1
0.4 TABLET SUBLINGUAL EVERY 5 MIN PRN
Qty: 25 TABLET | Refills: 3 | Status: SHIPPED
Start: 2020-06-08 | End: 2021-11-22 | Stop reason: SDUPTHER

## 2020-06-08 ASSESSMENT — PATIENT HEALTH QUESTIONNAIRE - PHQ9
1. LITTLE INTEREST OR PLEASURE IN DOING THINGS: 0
2. FEELING DOWN, DEPRESSED OR HOPELESS: 0
SUM OF ALL RESPONSES TO PHQ QUESTIONS 1-9: 0
SUM OF ALL RESPONSES TO PHQ QUESTIONS 1-9: 0
SUM OF ALL RESPONSES TO PHQ9 QUESTIONS 1 & 2: 0

## 2020-06-08 NOTE — PROGRESS NOTES
[facility]  FAMILY MEDICINE RESIDENCY PROGRAM   OFFICE PROGRESS NOTE  DATE OF VISIT : 2020    Patient : Sridhar Hicks   Sex : male   Age : 80 y.o.  : 1935   MRN : <[id number]>            Chief Complaint :   Chief Complaint   Patient presents with    Hypertension    Hyperlipidemia    Coronary Artery Disease    Benign Prostatic Hypertrophy    Gastroesophageal Reflux    Chronic Kidney Disease       HPI:   Sridhar Hicks comes to clinic today for     F/U of chronic problem(s)     Chronic problems reviewed today include:     Hypertension, Hyperlipidemia, Coronary artery disease, Chronic kidney disease and GERD and hyperlipidemia    Current status of this/these condition(s):  stable    Tolerating meds: Yes    Additional history: Mr. Derrick Simpson comes in for a follow-up evaluation. He has been monitoring his home blood pressures and brings those recordings in with him they are all entirely normal with the highest being in the 130s and the lowest being in the 04F systolic there are no elevated readings nor are there any significantly low readings. These are slightly lower than what we obtained in the office today. He denies any headaches or dizziness, blurred vision, chest pain, palpitations, shortness of breath, cough, gastrointestinal upset, constipation or diarrhea, or new or significant pains. He does have some troubles with urinary frequency and dribbling which has been present since his surgery. Mr. Derrick Simpson wife  within the past month. She was in the hospital for 6 weeks and he was unable to visit her because of the [place] pandemic. He tells me that he is handling this well and is satisfied that she is in a better place. He has family and friends for support and generally has a visitor on a daily basis. He does live alone, but is comfortable with that for the time being.   He actually continues to work 3 to 4 days a week and although he misses his wife, feels that he is handling it quite well he denies any need for other care. Patient Active Problem List   Diagnosis    Coronary artery disease involving native coronary artery of native heart without angina pectoris    Gastroesophageal reflux disease without esophagitis    Essential hypertension    Mixed hyperlipidemia    Benign non-nodular prostatic hyperplasia with lower urinary tract symptoms    First-degree atrioventricular block    CKD (chronic kidney disease) stage 3, GFR 30-59 ml/min ([facility])       Past Medical History:   Diagnosis Date    Arthritis     Fingers and knees bilaterally    CAD (coronary artery disease)     CKD (chronic kidney disease) stage 3, GFR 30-59 ml/min ([facility]) 6/8/2020    GERD (gastroesophageal reflux disease) 2013    Hyperlipidemia     Hypertension     Myocardial infarct, old 1999    Renal insufficiency     Valvular heart disease        Current Outpatient Medications on File Prior to Visit   Medication Sig Dispense Refill    metoprolol (LOPRESSOR) 100 MG tablet Take 1 tablet by mouth 2 times daily 180 tablet 3    benazepril (LOTENSIN) 20 MG tablet Take 1 tablet by mouth 2 times daily 180 tablet 3    isosorbide mononitrate (IMDUR) 60 MG extended release tablet Take 1 tablet by mouth daily 90 tablet 3    atorvastatin (LIPITOR) 40 MG tablet Take 1 tablet by mouth daily 90 tablet 3    Multiple Vitamin (MULTI-VITAMIN DAILY PO) Take by mouth daily      aspirin 81 MG tablet Take 81 mg by mouth daily      famotidine (PEPCID) 20 MG tablet Take 1 tablet by mouth 2 times daily (Patient not taking: Reported on 6/8/2020) 60 tablet 3    hydrochlorothiazide (MICROZIDE) 12.5 MG capsule Take 1 capsule by mouth every morning (Patient not taking: Reported on 6/8/2020) 90 capsule 3     No current facility-administered medications on file prior to visit.         No Known Allergies    Family History   Problem Relation Age of Onset    Cancer Mother     High Blood Pressure Mother     High Cholesterol Alert, oriented. Normal gait and coordination   No focal neurologic deficits noted. Psychiatric: has a normal mood and affect. Behavior is normal.     Most recent labs and imaging reviewed. Findings include:     I reviewed all of his pulse and blood pressure recordings. Tereza Grimaldo was seen today for hypertension, hyperlipidemia, coronary artery disease, benign prostatic hypertrophy, gastroesophageal reflux and chronic kidney disease. Diagnoses and all orders for this visit:    Coronary artery disease involving native coronary artery of native heart without angina pectoris    Essential hypertension  -     CBC Auto Differential; Future  -     Comprehensive Metabolic Panel; Future    Gastroesophageal reflux disease without esophagitis  -     CBC Auto Differential; Future    Benign non-nodular prostatic hyperplasia with lower urinary tract symptoms    Mixed hyperlipidemia    CKD (chronic kidney disease) stage 3, GFR 30-59 ml/min ([facility])    Other orders  -     nitroGLYCERIN (NITROSTAT) 0.4 MG SL tablet; Place 1 tablet under the tongue every 5 minutes as needed for Chest pain up to max of 3 total doses. If no relief after 1 dose, call 911. Additional Plan: I provided support to Mr. Mireya Wellington at this time. He does not feel the need for counseling or medication to assist him through this grief. He feels that his family and friends and his work are enough to get him through this. He will continue on all of his current medications. I did refill his Nitrostat which he uses very rarely. If he continues to have troubles with urination, he may want to seek additional help from his urologist.  He will continue to monitor blood pressure and pulse on an intermittent basis, and notify me of any abnormalities. He will call with further difficulties or worsening symptoms, but otherwise I could see him every 6 months. RTO in in 6 month(s) or sooner for any persistent, new, or worsening symptoms.       Please see

## 2020-06-08 NOTE — PATIENT INSTRUCTIONS
Patient Education        Preventing Falls: Care Instructions  Your Care Instructions     Getting around your home safely can be a challenge if you have injuries or health problems that make it easy for you to fall. Loose rugs and furniture in walkways are among the dangers for many older people who have problems walking or who have poor eyesight. People who have conditions such as arthritis, osteoporosis, or dementia also have to be careful not to fall. You can make your home safer with a few simple measures. Follow-up care is a key part of your treatment and safety. Be sure to make and go to all appointments, and call your doctor if you are having problems. It's also a good idea to know your test results and keep a list of the medicines you take. How can you care for yourself at home? Taking care of yourself  · You may get dizzy if you do not drink enough water. To prevent dehydration, drink plenty of fluids, enough so that your urine is light yellow or clear like water. Choose water and other caffeine-free clear liquids. If you have kidney, heart, or liver disease and have to limit fluids, talk with your doctor before you increase the amount of fluids you drink. · Exercise regularly to improve your strength, muscle tone, and balance. Walk if you can. Swimming may be a good choice if you cannot walk easily. · Have your vision and hearing checked each year or any time you notice a change. If you have trouble seeing and hearing, you might not be able to avoid objects and could lose your balance. · Know the side effects of the medicines you take. Ask your doctor or pharmacist whether the medicines you take can affect your balance. Sleeping pills or sedatives can affect your balance. · Limit the amount of alcohol you drink. Alcohol can impair your balance and other senses. · Ask your doctor whether calluses or corns on your feet need to be removed.  If you wear loose-fitting shoes because of calluses or corns,

## 2020-06-09 LAB
ALBUMIN SERPL-MCNC: 4.3 G/DL (ref 3.5–5.2)
ALP BLD-CCNC: 83 U/L (ref 40–129)
ALT SERPL-CCNC: 21 U/L (ref 0–40)
ANION GAP SERPL CALCULATED.3IONS-SCNC: 13 MMOL/L (ref 7–16)
AST SERPL-CCNC: 27 U/L (ref 0–39)
BASOPHILS ABSOLUTE: 0.04 E9/L (ref 0–0.2)
BASOPHILS RELATIVE PERCENT: 0.6 % (ref 0–2)
BILIRUB SERPL-MCNC: 0.7 MG/DL (ref 0–1.2)
BUN BLDV-MCNC: 28 MG/DL (ref 8–23)
CALCIUM SERPL-MCNC: 9.2 MG/DL (ref 8.6–10.2)
CHLORIDE BLD-SCNC: 102 MMOL/L (ref 98–107)
CO2: 23 MMOL/L (ref 22–29)
CREAT SERPL-MCNC: 1.4 MG/DL (ref 0.7–1.2)
EOSINOPHILS ABSOLUTE: 0.19 E9/L (ref 0.05–0.5)
EOSINOPHILS RELATIVE PERCENT: 2.6 % (ref 0–6)
GFR AFRICAN AMERICAN: 58
GFR NON-AFRICAN AMERICAN: 48 ML/MIN/1.73
GLUCOSE BLD-MCNC: 143 MG/DL (ref 74–99)
HCT VFR BLD CALC: 47.4 % (ref 37–54)
HEMOGLOBIN: 15.3 G/DL (ref 12.5–16.5)
IMMATURE GRANULOCYTES #: 0.09 E9/L
IMMATURE GRANULOCYTES %: 1.2 % (ref 0–5)
LYMPHOCYTES ABSOLUTE: 1.81 E9/L (ref 1.5–4)
LYMPHOCYTES RELATIVE PERCENT: 25.1 % (ref 20–42)
MCH RBC QN AUTO: 31.9 PG (ref 26–35)
MCHC RBC AUTO-ENTMCNC: 32.3 % (ref 32–34.5)
MCV RBC AUTO: 99 FL (ref 80–99.9)
MONOCYTES ABSOLUTE: 0.74 E9/L (ref 0.1–0.95)
MONOCYTES RELATIVE PERCENT: 10.2 % (ref 2–12)
NEUTROPHILS ABSOLUTE: 4.35 E9/L (ref 1.8–7.3)
NEUTROPHILS RELATIVE PERCENT: 60.3 % (ref 43–80)
PDW BLD-RTO: 12.3 FL (ref 11.5–15)
PLATELET # BLD: 224 E9/L (ref 130–450)
PMV BLD AUTO: 10.6 FL (ref 7–12)
POTASSIUM SERPL-SCNC: 4.3 MMOL/L (ref 3.5–5)
RBC # BLD: 4.79 E12/L (ref 3.8–5.8)
SODIUM BLD-SCNC: 138 MMOL/L (ref 132–146)
TOTAL PROTEIN: 6.8 G/DL (ref 6.4–8.3)
WBC # BLD: 7.2 E9/L (ref 4.5–11.5)

## 2020-07-10 ENCOUNTER — OFFICE VISIT (OUTPATIENT)
Dept: CARDIOLOGY CLINIC | Age: 85
End: 2020-07-10
Payer: MEDICARE

## 2020-07-10 VITALS
SYSTOLIC BLOOD PRESSURE: 128 MMHG | HEIGHT: 66 IN | WEIGHT: 152 LBS | HEART RATE: 66 BPM | DIASTOLIC BLOOD PRESSURE: 68 MMHG | RESPIRATION RATE: 16 BRPM | BODY MASS INDEX: 24.43 KG/M2 | OXYGEN SATURATION: 96 %

## 2020-07-10 PROCEDURE — 93000 ELECTROCARDIOGRAM COMPLETE: CPT | Performed by: INTERNAL MEDICINE

## 2020-07-10 PROCEDURE — 99214 OFFICE O/P EST MOD 30 MIN: CPT | Performed by: INTERNAL MEDICINE

## 2020-07-10 SDOH — HEALTH STABILITY: MENTAL HEALTH: HOW OFTEN DO YOU HAVE A DRINK CONTAINING ALCOHOL?: NOT ASKED

## 2020-07-10 NOTE — PROGRESS NOTES
OUTPATIENT CARDIOLOGY FOLLOW-UP    Name: Kae Mckenzie    Age: 80 y.o. Primary Care Physician: Javier Spangler MD    Date of Service: 7/10/2020    Chief Complaint: Coronary atherosclerosis, hypertension, chronic kidney disease    Interim History: Since his most recent assessment, the patient is remained stable from a cardiovascular standpoint with no interim symptoms of anginal-like chest discomfort or other ischemic equivalents, manifestations of decompensated left ventricular systolic dysfunction or volume overload nor arrhythmia related manifestations. He relates mild symptomatology of lightheadedness related to upward gaze without associated focal neurologic symptomatology. Within the past year, his wife his unfortunately  related to complications of [id number] infection. At the time of his assessment, he relates no additional changes of his functional capabilities and is normotensive at the time of his examination. Review of Systems: The remainder of a complete multisystem review including consitutional, central nervous, respiratory, circulatory, gastrointestinal, genitourinary, endocrinologic, hematologic, musculoskeletal and psychiatric are negative.     Past Medical History:  Past Medical History:   Diagnosis Date    Arthritis     Fingers and knees bilaterally    CAD (coronary artery disease)     CKD (chronic kidney disease) stage 3, GFR 30-59 ml/min ([facility]) 2020    GERD (gastroesophageal reflux disease)     Hyperlipidemia     Hypertension     Myocardial infarct, old     Renal insufficiency     Valvular heart disease        Past Surgical History:  Past Surgical History:   Procedure Laterality Date    APPENDECTOMY  1960    CARDIAC CATHETERIZATION  10/2006    left main 20% ostial, LAD 20% prox, first diag 40% ostial, LCX 30%prox & 30%mid, RCA no significant disease    CARDIAC CATHETERIZATION  2011    patent stent    CARDIAC CATHETERIZATION  2016    patent stents, LM 20% ostial, Cx luminal irregularities--medical management    CARDIOVASCULAR STRESS TEST  10/03/2011    nuclear    CARDIOVASCULAR STRESS TEST  01/25/2016    CORONARY ANGIOPLASTY WITH STENT PLACEMENT Left 1999    stent to RCA  @ [facility]    TRANSTHORACIC ECHOCARDIOGRAM  01/24/2016    EF 55%, 1+TR & 1+ MR       Family History:  Family History   Problem Relation Age of Onset    Cancer Mother     High Blood Pressure Mother     High Cholesterol Mother     Cirrhosis Father        Social History:  Social History     Socioeconomic History    Marital status:      Spouse name: Not on file    Number of children: Not on file    Years of education: Not on file    Highest education level: Not on file   Occupational History    Not on file   Social Needs    Financial resource strain: Not on file    Food insecurity     Worry: Not on file     Inability: Not on file    Transportation needs     Medical: Not on file     Non-medical: Not on file   Tobacco Use    Smoking status: Never Smoker    Smokeless tobacco: Never Used   Substance and Sexual Activity    Alcohol use: Not Currently    Drug use: Never    Sexual activity: Not on file   Lifestyle    Physical activity     Days per week: Not on file     Minutes per session: Not on file    Stress: Not on file   Relationships    Social connections     Talks on phone: Not on file     Gets together: Not on file     Attends Mandaeism service: Not on file     Active member of club or organization: Not on file     Attends meetings of clubs or organizations: Not on file     Relationship status: Not on file    Intimate partner violence     Fear of current or ex partner: Not on file     Emotionally abused: Not on file     Physically abused: Not on file     Forced sexual activity: Not on file   Other Topics Concern    Not on file   Social History Narrative    Drinks 1 cup of coffee daily.         Allergies:  No Known Allergies    Current Medications:  Current Outpatient Medications   Medication Sig Dispense Refill    nitroGLYCERIN (NITROSTAT) 0.4 MG SL tablet Place 1 tablet under the tongue every 5 minutes as needed for Chest pain up to max of 3 total doses. If no relief after 1 dose, call 911. 25 tablet 3    hydrochlorothiazide (MICROZIDE) 12.5 MG capsule Take 1 capsule by mouth every morning 90 capsule 3    metoprolol (LOPRESSOR) 100 MG tablet Take 1 tablet by mouth 2 times daily 180 tablet 3    benazepril (LOTENSIN) 20 MG tablet Take 1 tablet by mouth 2 times daily 180 tablet 3    isosorbide mononitrate (IMDUR) 60 MG extended release tablet Take 1 tablet by mouth daily 90 tablet 3    atorvastatin (LIPITOR) 40 MG tablet Take 1 tablet by mouth daily 90 tablet 3    Multiple Vitamin (MULTI-VITAMIN DAILY PO) Take by mouth daily      aspirin 81 MG tablet Take 81 mg by mouth daily      famotidine (PEPCID) 20 MG tablet Take 1 tablet by mouth 2 times daily (Patient not taking: Reported on 6/8/2020) 60 tablet 3     No current facility-administered medications for this visit. Physical Exam:  /68 (Site: Left Upper Arm, Position: Sitting, Cuff Size: Medium Adult)   Pulse 66   Resp 16   Ht 5' 6\" (1.676 m)   Wt 152 lb (68.9 kg)   SpO2 96%   BMI 24.53 kg/m²   Wt Readings from Last 3 Encounters:   07/10/20 152 lb (68.9 kg)   06/08/20 152 lb (68.9 kg)   01/20/20 152 lb (68.9 kg)     The patient is awake, alert and in no discomfort or distress. No gross musculoskeletal deformity is present. No significant skin or nail changes are present. Gross examination of head, eyes, nose and throat are negative. Jugular venous pressure is normal and no carotid bruits are present. Normal respiratory effort is noted with no accessory muscle usage present. Lung fields are clear to ascultation. Cardiac examination is notable for a regular rate and rhythm with no palpable thrill. No gallop rhythm or cardiac murmur are identified.  A benign abdominal examination is present with no masses or organomegaly. Intact pulses are present throughout all extremities and no peripheral edema is present. No focal neurologic deficits are present. Laboratory Tests:  Lab Results   Component Value Date    CREATININE 1.4 (H) 06/08/2020    BUN 28 (H) 06/08/2020     06/08/2020    K 4.3 06/08/2020     06/08/2020    CO2 23 06/08/2020     No results found for: BNP  Lab Results   Component Value Date    WBC 7.2 06/08/2020    RBC 4.79 06/08/2020    HGB 15.3 06/08/2020    HCT 47.4 06/08/2020    MCV 99.0 06/08/2020    MCH 31.9 06/08/2020    MCHC 32.3 06/08/2020    RDW 12.3 06/08/2020     06/08/2020    MPV 10.6 06/08/2020     No results for input(s): ALKPHOS, ALT, AST, PROT, BILITOT, BILIDIR, LABALBU in the last 72 hours. No results found for: MG  No results found for: PROTIME, INR  Lab Results   Component Value Date    TSH 2.770 01/20/2020     No components found for: CHLPL  Lab Results   Component Value Date    TRIG 234 (H) 01/20/2020    TRIG 142 06/14/2018    TRIG 150 (H) 05/15/2017     Lab Results   Component Value Date    HDL 49 01/20/2020    HDL 48 06/14/2018    HDL 63 05/15/2017     Lab Results   Component Value Date    LDLCALC 74 01/20/2020    LDLCALC 86 06/14/2018    [address] 05/15/2017       Cardiac Tests:  ECG: A resting electrocardiogram demonstrates evidence of sinus rhythm and is otherwise unremarkable      ASSESSMENT / PLAN: On a clinical basis, the patient remains stable from a cardiovascular standpoint with no active cardiovascular symptoms and at present I have not altered his existing medical regimen. He is positional lightheadedness is likely not of a cardiovascular origin and more likely vestibular in nature. Continued symptom monitoring will remain essential along with that of appropriate monitoring of his renal function in light of his concomitant hypertension and stage III chronic kidney disease to assist in optimization of medical therapy.   Continued aggressive risk factor modification of blood pressure and serum lipids will remain essential to reducing risk of future atherosclerotic development. I presently plan his clinical reassessment in approximately 1 year and would happily reevaluate him in the interim should additional cardiovascular difficulties or concerns arise. The patient's current medication list, allergies, problem list and results of all previously ordered testing were reviewed at today's visit. Follow-up office visit in 1 year      Note: This report was completed using computerized voice recognition software. Every effort has been made to ensure accuracy, however; and invert and computerized transcription errors may be present. Katheryn Jaquez.  William Ferrari, [address] [place] [address] Cardiology    An electronic copy of this follow-up progress note was forwarded to Dr. Wendi Mcarthur

## 2020-10-02 ENCOUNTER — APPOINTMENT (OUTPATIENT)
Dept: GENERAL RADIOLOGY | Age: 85
End: 2020-10-02
Payer: MEDICARE

## 2020-10-02 ENCOUNTER — HOSPITAL ENCOUNTER (OUTPATIENT)
Age: 85
Setting detail: OBSERVATION
Discharge: HOME | End: 2020-10-03
Attending: EMERGENCY MEDICINE | Admitting: FAMILY MEDICINE
Payer: MEDICARE

## 2020-10-02 ENCOUNTER — APPOINTMENT (OUTPATIENT)
Dept: CT IMAGING | Age: 85
End: 2020-10-02
Payer: MEDICARE

## 2020-10-02 PROBLEM — S32.599A CLOSED FRACTURE OF MULTIPLE PUBIC RAMI, INITIAL ENCOUNTER (HCC): Status: ACTIVE | Noted: 2020-10-02

## 2020-10-02 LAB
ALBUMIN SERPL-MCNC: 4.2 G/DL (ref 3.5–5.2)
ALP BLD-CCNC: 80 U/L (ref 40–129)
ALT SERPL-CCNC: 24 U/L (ref 0–40)
ANION GAP SERPL CALCULATED.3IONS-SCNC: 12 MMOL/L (ref 7–16)
AST SERPL-CCNC: 27 U/L (ref 0–39)
BASOPHILS ABSOLUTE: 0.04 E9/L (ref 0–0.2)
BASOPHILS RELATIVE PERCENT: 0.3 % (ref 0–2)
BILIRUB SERPL-MCNC: 0.9 MG/DL (ref 0–1.2)
BUN BLDV-MCNC: 32 MG/DL (ref 8–23)
CALCIUM SERPL-MCNC: 9.5 MG/DL (ref 8.6–10.2)
CHLORIDE BLD-SCNC: 100 MMOL/L (ref 98–107)
CO2: 25 MMOL/L (ref 22–29)
CREAT SERPL-MCNC: 1.4 MG/DL (ref 0.7–1.2)
EOSINOPHILS ABSOLUTE: 0.15 E9/L (ref 0.05–0.5)
EOSINOPHILS RELATIVE PERCENT: 1.3 % (ref 0–6)
GFR AFRICAN AMERICAN: 58
GFR NON-AFRICAN AMERICAN: 48 ML/MIN/1.73
GLUCOSE BLD-MCNC: 99 MG/DL (ref 74–99)
HCT VFR BLD CALC: 47 % (ref 37–54)
HEMOGLOBIN: 15.7 G/DL (ref 12.5–16.5)
IMMATURE GRANULOCYTES #: 0.19 E9/L
IMMATURE GRANULOCYTES %: 1.6 % (ref 0–5)
LYMPHOCYTES ABSOLUTE: 1.65 E9/L (ref 1.5–4)
LYMPHOCYTES RELATIVE PERCENT: 14 % (ref 20–42)
MCH RBC QN AUTO: 31.5 PG (ref 26–35)
MCHC RBC AUTO-ENTMCNC: 33.4 % (ref 32–34.5)
MCV RBC AUTO: 94.2 FL (ref 80–99.9)
MONOCYTES ABSOLUTE: 0.84 E9/L (ref 0.1–0.95)
MONOCYTES RELATIVE PERCENT: 7.1 % (ref 2–12)
NEUTROPHILS ABSOLUTE: 8.93 E9/L (ref 1.8–7.3)
NEUTROPHILS RELATIVE PERCENT: 75.7 % (ref 43–80)
PDW BLD-RTO: 12.4 FL (ref 11.5–15)
PLATELET # BLD: 199 E9/L (ref 130–450)
PMV BLD AUTO: 10.1 FL (ref 7–12)
POTASSIUM SERPL-SCNC: 4 MMOL/L (ref 3.5–5)
RBC # BLD: 4.99 E12/L (ref 3.8–5.8)
SODIUM BLD-SCNC: 137 MMOL/L (ref 132–146)
TOTAL PROTEIN: 6.6 G/DL (ref 6.4–8.3)
WBC # BLD: 11.8 E9/L (ref 4.5–11.5)

## 2020-10-02 PROCEDURE — 99284 EMERGENCY DEPT VISIT MOD MDM: CPT

## 2020-10-02 PROCEDURE — 6370000000 HC RX 637 (ALT 250 FOR IP): Performed by: FAMILY MEDICINE

## 2020-10-02 PROCEDURE — G0378 HOSPITAL OBSERVATION PER HR: HCPCS

## 2020-10-02 PROCEDURE — 6370000000 HC RX 637 (ALT 250 FOR IP): Performed by: STUDENT IN AN ORGANIZED HEALTH CARE EDUCATION/TRAINING PROGRAM

## 2020-10-02 PROCEDURE — 80053 COMPREHEN METABOLIC PANEL: CPT

## 2020-10-02 PROCEDURE — 85025 COMPLETE CBC W/AUTO DIFF WBC: CPT

## 2020-10-02 PROCEDURE — 73700 CT LOWER EXTREMITY W/O DYE: CPT

## 2020-10-02 PROCEDURE — 73502 X-RAY EXAM HIP UNI 2-3 VIEWS: CPT

## 2020-10-02 RX ORDER — LISINOPRIL 20 MG/1
20 TABLET ORAL DAILY
Status: DISCONTINUED | OUTPATIENT
Start: 2020-10-03 | End: 2020-10-03 | Stop reason: HOSPADM

## 2020-10-02 RX ORDER — HYDROCODONE BITARTRATE AND ACETAMINOPHEN 5; 325 MG/1; MG/1
1 TABLET ORAL ONCE
Status: COMPLETED | OUTPATIENT
Start: 2020-10-02 | End: 2020-10-02

## 2020-10-02 RX ORDER — ACETAMINOPHEN 325 MG/1
650 TABLET ORAL EVERY 6 HOURS PRN
Status: DISCONTINUED | OUTPATIENT
Start: 2020-10-02 | End: 2020-10-03 | Stop reason: HOSPADM

## 2020-10-02 RX ORDER — METOPROLOL TARTRATE 50 MG/1
100 TABLET, FILM COATED ORAL 2 TIMES DAILY
Status: DISCONTINUED | OUTPATIENT
Start: 2020-10-02 | End: 2020-10-03 | Stop reason: HOSPADM

## 2020-10-02 RX ORDER — PROMETHAZINE HYDROCHLORIDE 25 MG/1
12.5 TABLET ORAL EVERY 6 HOURS PRN
Status: DISCONTINUED | OUTPATIENT
Start: 2020-10-02 | End: 2020-10-03 | Stop reason: HOSPADM

## 2020-10-02 RX ORDER — ACETAMINOPHEN 650 MG/1
650 SUPPOSITORY RECTAL EVERY 6 HOURS PRN
Status: DISCONTINUED | OUTPATIENT
Start: 2020-10-02 | End: 2020-10-03 | Stop reason: HOSPADM

## 2020-10-02 RX ORDER — ISOSORBIDE MONONITRATE 60 MG/1
60 TABLET, EXTENDED RELEASE ORAL DAILY
Status: DISCONTINUED | OUTPATIENT
Start: 2020-10-03 | End: 2020-10-03 | Stop reason: HOSPADM

## 2020-10-02 RX ORDER — POLYETHYLENE GLYCOL 3350 17 G/17G
17 POWDER, FOR SOLUTION ORAL DAILY PRN
Status: DISCONTINUED | OUTPATIENT
Start: 2020-10-02 | End: 2020-10-03 | Stop reason: HOSPADM

## 2020-10-02 RX ORDER — SODIUM CHLORIDE 0.9 % (FLUSH) 0.9 %
10 SYRINGE (ML) INJECTION PRN
Status: DISCONTINUED | OUTPATIENT
Start: 2020-10-02 | End: 2020-10-03 | Stop reason: HOSPADM

## 2020-10-02 RX ORDER — HYDROCHLOROTHIAZIDE 12.5 MG/1
12.5 TABLET ORAL EVERY MORNING
Status: DISCONTINUED | OUTPATIENT
Start: 2020-10-03 | End: 2020-10-03 | Stop reason: HOSPADM

## 2020-10-02 RX ORDER — ASPIRIN 81 MG/1
81 TABLET, CHEWABLE ORAL DAILY
Status: DISCONTINUED | OUTPATIENT
Start: 2020-10-03 | End: 2020-10-03 | Stop reason: HOSPADM

## 2020-10-02 RX ORDER — ATORVASTATIN CALCIUM 40 MG/1
40 TABLET, FILM COATED ORAL DAILY
Status: DISCONTINUED | OUTPATIENT
Start: 2020-10-03 | End: 2020-10-03 | Stop reason: HOSPADM

## 2020-10-02 RX ORDER — M-VIT,TX,IRON,MINS/CALC/FOLIC 27MG-0.4MG
1 TABLET ORAL DAILY
Status: DISCONTINUED | OUTPATIENT
Start: 2020-10-03 | End: 2020-10-03 | Stop reason: HOSPADM

## 2020-10-02 RX ORDER — NITROGLYCERIN 0.4 MG/1
0.4 TABLET SUBLINGUAL EVERY 5 MIN PRN
Status: DISCONTINUED | OUTPATIENT
Start: 2020-10-02 | End: 2020-10-03 | Stop reason: HOSPADM

## 2020-10-02 RX ORDER — ONDANSETRON 2 MG/ML
4 INJECTION INTRAMUSCULAR; INTRAVENOUS EVERY 6 HOURS PRN
Status: DISCONTINUED | OUTPATIENT
Start: 2020-10-02 | End: 2020-10-03 | Stop reason: HOSPADM

## 2020-10-02 RX ORDER — SODIUM CHLORIDE 0.9 % (FLUSH) 0.9 %
10 SYRINGE (ML) INJECTION EVERY 12 HOURS SCHEDULED
Status: DISCONTINUED | OUTPATIENT
Start: 2020-10-02 | End: 2020-10-03 | Stop reason: HOSPADM

## 2020-10-02 RX ADMIN — ACETAMINOPHEN 650 MG: 325 TABLET, FILM COATED ORAL at 23:29

## 2020-10-02 RX ADMIN — METOPROLOL TARTRATE 100 MG: 50 TABLET, FILM COATED ORAL at 23:29

## 2020-10-02 RX ADMIN — HYDROCODONE BITARTRATE AND ACETAMINOPHEN 1 TABLET: 5; 325 TABLET ORAL at 21:43

## 2020-10-02 ASSESSMENT — ENCOUNTER SYMPTOMS
BACK PAIN: 0
RHINORRHEA: 0
WHEEZING: 0
ALLERGIC/IMMUNOLOGIC NEGATIVE: 1
SORE THROAT: 0
ABDOMINAL PAIN: 0
COLOR CHANGE: 0
SHORTNESS OF BREATH: 0
CONSTIPATION: 0
VOMITING: 0
NAUSEA: 0
PHOTOPHOBIA: 0
COUGH: 0
DIARRHEA: 0

## 2020-10-02 ASSESSMENT — PAIN SCALES - GENERAL
PAINLEVEL_OUTOF10: 8
PAINLEVEL_OUTOF10: 9
PAINLEVEL_OUTOF10: 0
PAINLEVEL_OUTOF10: 5
PAINLEVEL_OUTOF10: 6

## 2020-10-02 NOTE — ED NOTES
Bed: 23  Expected date:   Expected time:   Means of arrival:   Comments:  triage     Pavel Merino RN  10/02/20 [id number]

## 2020-10-03 VITALS
HEART RATE: 76 BPM | DIASTOLIC BLOOD PRESSURE: 56 MMHG | HEIGHT: 67 IN | RESPIRATION RATE: 15 BRPM | TEMPERATURE: 97.5 F | SYSTOLIC BLOOD PRESSURE: 103 MMHG | BODY MASS INDEX: 24.33 KG/M2 | WEIGHT: 155 LBS | OXYGEN SATURATION: 96 %

## 2020-10-03 PROBLEM — S32.591A FRACTURE OF MULTIPLE PUBIC RAMI, RIGHT, CLOSED, INITIAL ENCOUNTER (HCC): Status: ACTIVE | Noted: 2020-10-02

## 2020-10-03 LAB
ANION GAP SERPL CALCULATED.3IONS-SCNC: 13 MMOL/L (ref 7–16)
BUN BLDV-MCNC: 30 MG/DL (ref 8–23)
CALCIUM SERPL-MCNC: 9 MG/DL (ref 8.6–10.2)
CHLORIDE BLD-SCNC: 101 MMOL/L (ref 98–107)
CO2: 25 MMOL/L (ref 22–29)
CREAT SERPL-MCNC: 1.4 MG/DL (ref 0.7–1.2)
EKG ATRIAL RATE: 60 BPM
EKG P AXIS: 55 DEGREES
EKG P-R INTERVAL: 216 MS
EKG Q-T INTERVAL: 426 MS
EKG QRS DURATION: 100 MS
EKG QTC CALCULATION (BAZETT): 426 MS
EKG R AXIS: -24 DEGREES
EKG T AXIS: 60 DEGREES
EKG VENTRICULAR RATE: 60 BPM
GFR AFRICAN AMERICAN: 58
GFR NON-AFRICAN AMERICAN: 48 ML/MIN/1.73
GLUCOSE BLD-MCNC: 110 MG/DL (ref 74–99)
HCT VFR BLD CALC: 45 % (ref 37–54)
HEMOGLOBIN: 15 G/DL (ref 12.5–16.5)
MCH RBC QN AUTO: 31.6 PG (ref 26–35)
MCHC RBC AUTO-ENTMCNC: 33.3 % (ref 32–34.5)
MCV RBC AUTO: 94.7 FL (ref 80–99.9)
PDW BLD-RTO: 12.3 FL (ref 11.5–15)
PLATELET # BLD: 179 E9/L (ref 130–450)
PMV BLD AUTO: 10.1 FL (ref 7–12)
POTASSIUM REFLEX MAGNESIUM: 4.3 MMOL/L (ref 3.5–5)
RBC # BLD: 4.75 E12/L (ref 3.8–5.8)
SODIUM BLD-SCNC: 139 MMOL/L (ref 132–146)
WBC # BLD: 8.7 E9/L (ref 4.5–11.5)

## 2020-10-03 PROCEDURE — 97161 PT EVAL LOW COMPLEX 20 MIN: CPT

## 2020-10-03 PROCEDURE — 2580000003 HC RX 258: Performed by: FAMILY MEDICINE

## 2020-10-03 PROCEDURE — 93010 ELECTROCARDIOGRAM REPORT: CPT | Performed by: INTERNAL MEDICINE

## 2020-10-03 PROCEDURE — G0378 HOSPITAL OBSERVATION PER HR: HCPCS

## 2020-10-03 PROCEDURE — 99235 HOSP IP/OBS SAME DATE MOD 70: CPT | Performed by: FAMILY MEDICINE

## 2020-10-03 PROCEDURE — 80048 BASIC METABOLIC PNL TOTAL CA: CPT

## 2020-10-03 PROCEDURE — 6370000000 HC RX 637 (ALT 250 FOR IP): Performed by: FAMILY MEDICINE

## 2020-10-03 PROCEDURE — 85027 COMPLETE CBC AUTOMATED: CPT

## 2020-10-03 PROCEDURE — 36415 COLL VENOUS BLD VENIPUNCTURE: CPT

## 2020-10-03 PROCEDURE — 93005 ELECTROCARDIOGRAM TRACING: CPT | Performed by: FAMILY MEDICINE

## 2020-10-03 RX ADMIN — ACETAMINOPHEN 650 MG: 325 TABLET, FILM COATED ORAL at 06:18

## 2020-10-03 RX ADMIN — ATORVASTATIN CALCIUM 40 MG: 40 TABLET, FILM COATED ORAL at 10:32

## 2020-10-03 RX ADMIN — Medication 10 ML: at 01:43

## 2020-10-03 RX ADMIN — ASPIRIN 81 MG: 81 TABLET, CHEWABLE ORAL at 10:32

## 2020-10-03 RX ADMIN — MULTIPLE VITAMINS W/ MINERALS TAB 1 TABLET: TAB at 10:33

## 2020-10-03 ASSESSMENT — PAIN SCALES - GENERAL
PAINLEVEL_OUTOF10: 0
PAINLEVEL_OUTOF10: 4
PAINLEVEL_OUTOF10: 0
PAINLEVEL_OUTOF10: 0

## 2020-10-03 NOTE — PROGRESS NOTES
[address] [facility]  Family Medicine Attending    S: 80 y.o. male with history of htn, CAD, HLD , CKD , GERD came in to the ER after a mechanical fall. Tripped down the stairs and hit his left side. Came in to the ER due to issues with ambulation and pain and was found to have minimally displaced right superior and inferior pubic rami fractures. Patient is having some pain today but is able to ambulate with physical therapy. Would like to go home today. Denies chest pain, shortness of breath, Nausea, vomiting. O: VS- Blood pressure (!) 141/68, pulse 60, temperature 97.8 °F (36.6 °C), temperature source Temporal, resp. rate 17, height 5' 7\" (1.702 m), weight 155 lb (70.3 kg), SpO2 93 %. Exam is as noted by resident with the following changes, additions or corrections:  Gen - sitting up in chair   Cardio - RRR, no murmur   Resp - Lungs CTAB, no wheeze   Ext- no peripheral edema. Strength in left lower extremity 5/5. Right lower extremity strength 4/5 limited by pain. Impressions: Active Problems:    Closed fracture of multiple pubic rami, initial encounter ([facility] [address]  Resolved Problems:    * No resolved hospital problems. *      Plan:   Minimally displaced right superior and inferior pubic rami fractures after mechanical fall. Ortho consulted- conservative non surgical management recommended. Pain control with tylenol. Patient would like to avoid opiates. PT seen patient. 18/24. Will d/c with home health for PT . Attending Physician Statement  I have reviewed the chart, including any radiology or labs, and seen the patient with the resident(s). I personally reviewed and performed key elements of the history and exam.  I agree with the assessment, plan and orders as documented by the resident. Please refer to the resident note for additional information. Nicole Bennett.  Bing

## 2020-10-03 NOTE — CONSULTS
Department of Orthopedic Surgery  Resident Consult Note          Reason for Consult:  Right pelvic pain    HISTORY OF PRESENT ILLNESS:       Patient is a 80 y.o. male who presents with a chief complaint of right pelvic pain. Patient states that earlier today he fell down 2 steps and landed on his side and had immediate pain afterwards. Patient denies any [place] or loss of consciousness or any current headache. The patient states he does take 81 mg of ASA every day. The patient states he was able to walk after the fall and walked back into the house and then again walked from the car into the emergency department. The patient states he does have pain with walking. Patient denies use of walker or cane for assistance during walking. Denies numbness/tingling/paresthesias. Denies any other orthopedic complaints at this time.       Past Medical History:        Diagnosis Date    Arthritis     Fingers and knees bilaterally    CAD (coronary artery disease)     CKD (chronic kidney disease) stage 3, GFR 30-59 ml/min 6/8/2020    GERD (gastroesophageal reflux disease) 2013    Hyperlipidemia     Hypertension     Myocardial infarct, old 1999    Renal insufficiency     Valvular heart disease      Past Surgical History:        Procedure Laterality Date    APPENDECTOMY  1960    CARDIAC CATHETERIZATION  10/2006    left main 20% ostial, LAD 20% prox, first diag 40% ostial, LCX 30%prox & 30%mid, RCA no significant disease    CARDIAC CATHETERIZATION  11/20/2011    patent stent    CARDIAC CATHETERIZATION  02/05/2016    patent stents, LM 20% ostial, Cx luminal irregularities--medical management    CARDIOVASCULAR STRESS TEST  10/03/2011    nuclear    CARDIOVASCULAR STRESS TEST  01/25/2016    CORONARY ANGIOPLASTY WITH STENT PLACEMENT Left 1999    stent to RCA  @ [facility]    TRANSTHORACIC ECHOCARDIOGRAM  01/24/2016    EF 55%, 1+TR & 1+ MR     Current Medications:   Current Facility-Administered Medications: aspirin chewable negative for headaches, dizziness  BEHAVIOR/PSYCH:  negative for increased agitation and anxiety    PHYSICAL EXAM:    VITALS:  BP (!) 121/58   Pulse 55   Temp 98.2 °F (36.8 °C) (Temporal)   Resp 17   Ht 5' 7\" (1.702 m)   Wt 155 lb (70.3 kg)   SpO2 95%   BMI 24.28 kg/m²   CONSTITUTIONAL:  awake, alert, cooperative, no apparent distress, and appears stated age  MUSCULOSKELETAL:  Right lower Extremity:  Skin intact circumferentially the right lower extremity  Compartments soft and compressible, calf non-tender  +DP & PT pulses, Brisk Cap refill, Toes warm and perfused  Sensation grossly intact superficial/deep peroneal,saphenous,sural,tibial n. distributions  · +GS/TA/EHL. · Patient is tenderness to palpation of the right pubic rami. Patient is able to stand with some pain. · Negative logroll for pain    Secondary Exam:   bilateralUE: No obvious signs of trauma. -TTP to fingers, hand, wrist, forearm, elbow, humerus, shoulder or clavicle. leftLE: No obvious signs of trauma. -TTP to foot, ankle, leg, knee, thigh, hip      DATA:    CBC:   Lab Results   Component Value Date    WBC 11.8 10/02/2020    RBC 4.99 10/02/2020    HGB 15.7 10/02/2020    HCT 47.0 10/02/2020    MCV 94.2 10/02/2020    MCH 31.5 10/02/2020    MCHC 33.4 10/02/2020    RDW 12.4 10/02/2020     10/02/2020    MPV 10.1 10/02/2020     PT/INR:  No results found for: PROTIME, INR  CRP/ESR: No results found for: CRP, SEDRATE  Lactic Acid : No results found for: LACTA    Radiology Review:  10/03/20 - XR were obtained and is hard to appreciate any pubic rami fractures on these views. CT scan from today was reviewed and demonstrates nondisplaced superior inferior pubic rami fractures on the right. No other fractures or dislocations noted about the proximal femur or pelvis. IMPRESSION:   · Right.   Inferior pubic rami fractures    PLAN:  WBAT - RLE  Pain Control per primary  Recommend PT/OT   Continue ice and elevation to decrease swelling  · The patient's fractures are able to be treated nonsurgically the patient may weight-bear as tolerated to the right lower extremity. No further acute intervention is needed at this time the patient may follow-up with Dr. Eugenio Herrera as an outpatient.   · Discuss with Dr. Eugenio Herrera

## 2020-10-03 NOTE — PROGRESS NOTES
[facility]  [facility] Residency Program  Progress Note - House Team 1    Patient:  Kenyon Slaughter 80 y.o. male MRN: [id number]     Date of Service: 10/3/2020     CC: rt hip pain  Overnight events: No acute events reported overnight     Subjective     79yo M who presented to the ED following a mechanical fall at home. CT hip showed a minimally displaced fracture of the inferior and superior pubic rami. Patient was seen and examined at bedside this morning. Pain is well controlled with acetaminophen (patient does not want narcotics)  Urinating without any discomfort or problems. Awaiting physical therapy evaluation. Likely discharge today  Patient has two daughters that live close to him and are able to assist him if needed. Objective     Physical Exam:  · Vitals: BP (!) 141/68   Pulse 60   Temp 97.8 °F (36.6 °C) (Temporal)   Resp 17   Ht 5' 7\" (1.702 m)   Wt 155 lb (70.3 kg)   SpO2 93%   BMI 24.28 kg/m²     · I & O - 24hr: No intake/output data recorded. Physical Exam  Constitutional:       General: He is not in acute distress. Appearance: Normal appearance. He is normal weight. HENT:      Head: Normocephalic and atraumatic. Eyes:      Extraocular Movements: Extraocular movements intact. Pupils: Pupils are equal, round, and reactive to light. Cardiovascular:      Rate and Rhythm: Normal rate and regular rhythm. Pulses: Normal pulses. Heart sounds: Normal heart sounds. No murmur. Pulmonary:      Effort: Pulmonary effort is normal. No respiratory distress. Breath sounds: Normal breath sounds. No wheezing. Abdominal:      General: Abdomen is flat. Bowel sounds are normal. There is no distension. Palpations: Abdomen is soft. Tenderness: There is no abdominal tenderness. Musculoskeletal:         General: No tenderness or deformity. Right lower leg: No edema. Left lower leg: No edema.       Comments: Decreased range of motion of the right hip due to pain. Neurological:      General: No focal deficit present. Mental Status: He is alert and oriented to person, place, and time. Mental status is at baseline. Sensory: Sensation is intact. Motor: Motor function is intact. Subject  Pertinent Labs & Imaging Studies   keven  CBC:   Lab Results   Component Value Date    WBC 8.7 10/03/2020    RBC 4.75 10/03/2020    HGB 15.0 10/03/2020    HCT 45.0 10/03/2020    MCV 94.7 10/03/2020    MCH 31.6 10/03/2020    MCHC 33.3 10/03/2020    RDW 12.3 10/03/2020     10/03/2020    MPV 10.1 10/03/2020     BMP:    Lab Results   Component Value Date     10/03/2020    K 4.3 10/03/2020     10/03/2020    CO2 25 10/03/2020    BUN 30 10/03/2020    LABALBU 4.2 10/02/2020    CREATININE 1.4 10/03/2020    CALCIUM 9.0 10/03/2020    GFRAA 58 10/03/2020    LABGLOM 48 10/03/2020    GLUCOSE 110 10/03/2020       Resident's Assessment and Plan     Cheyanne Schultz is a 80 y.o. male       1. Minimally displaced fracture of the inferior and superior pubic rami  - PT/OT  - Patient would like to avoid narcotics, pain control with Tylenol as needed  - Consider orthopedic surgery consult if worsening pain  - seen by ortho in ED, no surgical intervention. Can bear weight on the rt LE.   -needs outpatient follow up with      2. HTN/CAD  - Home medications: Metoprolol 100 mg twice daily, benazepril 20 mg, hydrochlorothiazide 12.5 mg daily and Imdur 60 mg daily  - BP slightly elevated in the ED at 159/69, likely secondary to pain  - Continue current medications  -EKG ordered      3. HLD  - On Lipitor 40 mg at home  - Continue current medications     4. CKD  - Baseline creatinine 1.4  - Creatinine on admission 1.4  - Monitor urine output    PT/OT evaluation:  DVT prophylaxis/ GI prophylaxis:   Disposition: home +/- home health / Dayana Moreno / Chavo Moreno / Richard Brennan MD, PGY-2  Attending physician: Dr. Kim James

## 2020-10-03 NOTE — H&P
[facility]  Family [facility] Residency Program  History and Physical    Patient:  Juan Manuel Toth 80 y.o. male MRN: [id number]     Date of Service: 10/2/2020    Hospital Day: 1      Chief complaint: had concerns including Fall (tripped over own feet, pain to R side of groin and R knee -head, +thinners). History of Present Illness   The patient is a 80 y.o. male with PMH of HTN, CAD, HLD, CKD and GERD who presented to the ED complaining of right hip pain following a mechanical fall. Patient states that around 5 PM today he closed his home door and try to turn around with packages in his hands when he tripped \"1 foot over the other\" and fell. Patient states that shortly after the fall he got up walked inside and called his daughter, noted that his walking was somewhat painful. Patient denied any chest pain, shortness of breath, palpitations, lightheadedness, dizziness or headache prior to the incident. Patient also denies any loss of consciousness, urinary or bowel incontinence. ED course:  Patient noted to have decreased range of motion of the right hip and pain elicited with movement. Hip x-ray showed no active pelvic or hip fractures. CT of the hip showed a minimally displaced fracture of the inferior and superior pubic rami on the right side. CBC was remarkable for mildly elevated WBC at 11.8. CMP was remarkable for creatinine of 1.4 (baseline)  Patient was given 1 dose of Norco.  Patient notes he does not want to take narcotics.       Medications   HYDROcodone-acetaminophen (NORCO) 5-325 MG per tablet 1 tablet (1 tablet Oral Given 10/2/20 2143)         Past Medical History:      Diagnosis Date    Arthritis     Fingers and knees bilaterally    CAD (coronary artery disease)     CKD (chronic kidney disease) stage 3, GFR 30-59 ml/min 6/8/2020    GERD (gastroesophageal reflux disease) 2013    Hyperlipidemia     Hypertension     Myocardial infarct, old 1999    Renal insufficiency History:   TOBACCO:   reports that he has never smoked. He has never used smokeless tobacco.  ETOH:   reports previous alcohol use. Family History:       Problem Relation Age of Onset    Cancer Mother     High Blood Pressure Mother     High Cholesterol Mother     Cirrhosis Father        REVIEW OF SYSTEMS:    Review of Systems   Constitutional: Negative for chills, diaphoresis, fatigue and fever. HENT: Negative for congestion and sore throat. Eyes: Negative for photophobia and visual disturbance. Respiratory: Negative for cough and shortness of breath. Cardiovascular: Negative for chest pain, palpitations and leg swelling. Gastrointestinal: Negative for abdominal pain, constipation, diarrhea, nausea and vomiting. Genitourinary: Negative for difficulty urinating, dysuria, frequency and hematuria. Musculoskeletal: Positive for arthralgias and gait problem. Negative for back pain and joint swelling. Skin: Negative for color change and wound. Neurological: Negative for dizziness, light-headedness, numbness and headaches. Hematological: Negative for adenopathy. Does not bruise/bleed easily. Physical Exam   Vitals: BP (!) 179/90   Pulse 73   Temp 97.5 °F (36.4 °C) (Temporal)   Resp 18   Ht 5' 7\" (1.702 m)   Wt 155 lb (70.3 kg)   SpO2 94%   BMI 24.28 kg/m²     Physical Exam  Constitutional:       General: He is not in acute distress. Appearance: Normal appearance. He is normal weight. HENT:      Head: Normocephalic and atraumatic. Eyes:      Extraocular Movements: Extraocular movements intact. Pupils: Pupils are equal, round, and reactive to light. Cardiovascular:      Rate and Rhythm: Normal rate and regular rhythm. Pulses: Normal pulses. Heart sounds: Normal heart sounds. No murmur. Pulmonary:      Effort: Pulmonary effort is normal. No respiratory distress. Breath sounds: Normal breath sounds. No wheezing.    Abdominal:      General: Abdomen is flat. Bowel sounds are normal. There is no distension. Palpations: Abdomen is soft. Tenderness: There is no abdominal tenderness. Musculoskeletal:         General: No tenderness or deformity. Right lower leg: No edema. Left lower leg: No edema. Comments: Decreased range of motion of the right hip due to pain. Neurological:      General: No focal deficit present. Mental Status: He is alert and oriented to person, place, and time. Mental status is at baseline. Sensory: Sensation is intact. Motor: Motor function is intact. Labs and Imaging Studies   Basic Labs  CBC:   No results for input(s): WBC, RBC, HGB, HCT, MCV, MCH, MCHC, RDW, PLT, MPV in the last 72 hours. BMP:  No results for input(s): NA, K, CL, CO2, BUN, CREATININE, GLUCOSE, CALCIUM, PROT, LABALBU, BILITOT, ALKPHOS, AST, ALT in the last 72 hours. LIVER PROFILE:   No results for input(s): AST, ALT, LIPASE, BILIDIR, BILITOT, ALKPHOS in the last 72 hours. Invalid input(s): AMYLASE,  ALB    PT/INR:   No results for input(s): PROTIME, INR in the last 72 hours. APTT:   No results for input(s): APTT in the last 72 hours. Fasting Lipid Panel:    Lab Results   Component Value Date    CHOL 170 01/20/2020    TRIG 234 01/20/2020    HDL 49 01/20/2020       Cardiac Enzymes:    No results found for: CKTOTAL, CKMB, CKMBINDEX, TROPONINI    Imaging Studies:     Ct Hip Right Wo Contrast    Result Date: 10/2/2020  EXAMINATION: CT OF THE RIGHT HIP WITHOUT CONTRAST 10/2/2020 7:51 pm TECHNIQUE: CT of the right hip was performed without the administration of intravenous contrast.  Multiplanar reformatted images are provided for review. Dose modulation, iterative reconstruction, and/or weight based adjustment of the mA/kV was utilized to reduce the radiation dose to as low as reasonably achievable. COMPARISON: Hip radiographs dated 10/02/2020. [employer]  HISTORY ORDERING SYSTEM PROVIDED HISTORY: trauma TECHNOLOGIST PROVIDED HISTORY: Reason for exam:->trauma What reading provider will be dictating this exam?->CRC FINDINGS: Moderate right hip degenerative changes. Minimally displaced fractures of the right superior and inferior pubic rami near the pubic symphysis. The right proximal femur is intact. Acetabulum appears intact on CT images. Minimally displaced right superior and inferior pubic rami fractures. Xr Hip 2-3 Vw W Pelvis Right    Result Date: 10/2/2020  EXAMINATION: ONE XRAY VIEW OF THE PELVIS AND TWO XRAY VIEWS RIGHT HIP 10/2/2020 6:34 pm COMPARISON: None. HISTORY: ORDERING SYSTEM PROVIDED HISTORY: fall TECHNOLOGIST PROVIDED HISTORY: Reason for exam:->fall What reading provider will be dictating this exam?->CRC FINDINGS: The hips are symmetric in appearance. There is no evidence of acute fracture or dislocation. Mild diffuse osteopenia. The pubic rami are intact. The sacrum is within normal limits. No radiographic evidence of acute pelvic or hip fracture. Resident's Assessment and Plan     Jackie House is a 80 y.o. male    Active Problems:    Closed fracture of multiple pubic rami, initial encounter ([facility] [address]  Resolved Problems:    * No resolved hospital problems. *      1. Minimally displaced fracture of the inferior and superior pubic rami  - PT/OT  - Patient would like to avoid narcotics, pain control with Tylenol as needed  - Consider orthopedic surgery consult if worsening pain    2. HTN/CAD  - Home medications: Metoprolol 100 mg twice daily, benazepril 20 mg, hydrochlorothiazide 12.5 mg daily and Imdur 60 mg daily  - BP slightly elevated in the ED at 159/69, likely secondary to pain  - Continue current medications  -EKG ordered    3. HLD  - On Lipitor 40 mg at home  - Continue current medications    4. CKD  - Baseline creatinine 1.4  - Creatinine on admission 1.4  - Monitor urine output    PT/OT evaluation:  DVT prophylaxis/ GI prophylaxis:   Disposition: home +/- home health / Hallie Lopez / Jacquie Oppenheim / Julian Olmstead Luisana Ellison MD, PGY-2   Attending physician: Dr. Giselle Howard

## 2020-10-03 NOTE — PROGRESS NOTES
Physical Therapy  Physical Therapy Initial Assessment     Name: Jayde Boyd  : 1935  MRN: [id number]    Referring Provider:  Naz Tian MD     Date of Service: 10/3/2020    Evaluating PT:  Addison Benitez, PT, DPT. [id number]    Room #:  3430/8952-T  Diagnosis:  Closed fx of multiple pubic rami  Reason for admission:  Fall, R groin pain, R pubic rami fx   Precautions:  Falls, RLE WBAT  Pertinent PMHx: HTN, HLD, CAD, arthritis, CKD, MI, valvular heart disease  Procedures: none  Equipment Recommendations:  FWW    SUBJECTIVE:  Pt lives alone in a 1 story home with 2 stair(s) to enter and no rail(s). Bed is on 1st floor and bath is on 1st floor. Pt ambulated with no AD PTA. States he owns [facility]. Pt independent for ADL performance. OBJECTIVE:   Initial Evaluation  Date: 10/3 Treatment   Short Term/ Long Term   Goals   AM-PAC 6 Clicks      Was pt agreeable to Eval/treatment? yes     Does pt have pain? 2/10 at rest, 8/10 moving     Bed Mobility  Rolling: NT  Supine to sit: Herber  Sit to supine: NT  Scooting: Herber  Independent    Transfers Sit to stand: SBA  Stand to sit: SBA  Stand pivot: NT  independent   Ambulation    70 feet with [employer] SBA    >150 feet with [employer] Mod I    Stair negotiation: ascended and descended  NT  >3 steps with 1 rail Mod I   ROM BUE:  See OT eval   BLE:  WFL     Strength BUE:  See OT eval   BLE:  knee ext 5/5  Ankle DF 5/5  Increase by 1/3 MMT grade    Balance Sitting EOB:  SBA  Dynamic Standing:  SBA  Sitting EOB:  independent  Dynamic Standing:   Mod I     -Pt is A & O x 3  -Sensation:  unremarkable   -Edema:  unremarkable     Therapeutic Exercises:  functional activity     Patient education  Pt educated on safety, sequencing of transfers, and role of PT    Patient response to education:   Pt verbalized understanding Pt demonstrated skill Pt requires further education in this area   yes partial yes     ASSESSMENT:    Comments:  Pt received reclined supine and agreeable to PT session  Patient required light hands on assistance for bed mobility with RLE. During ambulation, patient required max VCs for sequencing step to gait with [employer] - pt having increased difficulty with sequencing when distracted. Decreased gait speed and lonny noted. Patient returned to chair after session. Pt with all needs met and call light in reach. Pt would benefit from continued PT POC to address functional deficits described above. Treatment:  Patient practiced and was instructed in the following treatment:     Patient education provided continuously throughout session for sequencing, safety maintenance, and improving any deficits found during the evaluation.  Bed mobility training - pt given verbal and tactile cues to facilitate proper sequencing and safety during rolling and supine>sit as well as provided with physical assistance to complete task    STS and pivot transfer training - pt educated on proper hand and foot placement, safety and sequencing, and use of proper technique to safely complete sit<>stand and pivot transfers   Gait training- pt was given verbal and tactile cues to facilitate safe use of [employer] with step to pattern during ambulation   Verbal education for stair training with appropriate pattern    Pt's/ family goals   1. Return home and to work    Patient and or family understand(s) diagnosis, prognosis, and plan of care. yes    PLAN:    Current Treatment Recommendations   [] Strengthening     [] ROM   [x] Balance Training   [x] Endurance Training   [x] Transfer Training   [x] Gait Training   [x] Stair Training   [] Positioning   [x] Safety and Education Training   [] Patient/Caregiver Education   [] HEP  [] Other     Frequency of treatments: 2-5x/week x 1-2 weeks.     Time in  0725  Time out  0740    Total Treatment Time 5 minutes     Evaluation Time includes thorough review of current medical information, gathering information on past medical history/social history and prior level of function, completion of standardized testing/informal observation of tasks, assessment of data and education on plan of care and goals.     CPT codes:  [x] Low Complexity PT evaluation 99331  [] Moderate Complexity PT evaluation 87072  [] High Complexity PT evaluation 55735  [] PT Re-evaluation 43054  [] Gait training 92555 - minutes  [] Manual therapy 43872 - minutes  [] Therapeutic activities 95569 - minutes  [] Therapeutic exercises 30400 - minutes  [] Neuromuscular reeducation 02650 - minutes     Mayra Merchant, PT, DPT  [id number]  Freddy Christensen, SPT

## 2020-10-03 NOTE — DISCHARGE SUMMARY
Discharge Summary    Joy Downs  :  1935  MRN:  [id number]    Admit date:  10/2/2020  Discharge date: 10/3/20     Admitting Physician:  Mike Robb MD    Discharge Diagnoses:    Patient Active Problem List   Diagnosis    Coronary artery disease involving native coronary artery of native heart without angina pectoris    Gastroesophageal reflux disease without esophagitis    Essential hypertension    Mixed hyperlipidemia    Benign non-nodular prostatic hyperplasia with lower urinary tract symptoms    First-degree atrioventricular block    CKD (chronic kidney disease) stage 3, GFR 30-59 ml/min    Fracture of multiple pubic rami, right, closed, initial encounter [facility])       Admission Condition:  good    Discharged Condition:  fair    Hospital Course:   Patient presented to the ED following a mechanical fall that resulted on a minimally displaced fracture of the superior and inferior pubic rami. Patient was admitted for observation overnight. Orthopedic surgery consulted and recommended non-surgical intervention with outpatient follow-up. Patient was seen by PT, able to ambulate with some pain as well as transfer from chair to standing. Patient notes pain is controlled with tylenol and would like to avoid narcotics. Home health order for at home PT.        Discharge Medications:         Medication List      CONTINUE taking these medications    aspirin 81 MG tablet     atorvastatin 40 MG tablet  Commonly known as:  LIPITOR  Take 1 tablet by mouth daily     benazepril 20 MG tablet  Commonly known as:  LOTENSIN  Take 1 tablet by mouth 2 times daily     famotidine 20 MG tablet  Commonly known as:  PEPCID  Take 1 tablet by mouth 2 times daily     hydroCHLOROthiazide 12.5 MG capsule  Commonly known as:  MICROZIDE  Take 1 capsule by mouth every morning     isosorbide mononitrate 60 MG extended release tablet  Commonly known as:  IMDUR  Take 1 tablet by mouth daily     metoprolol 100 MG tablet  Commonly known as:  LOPRESSOR  Take 1 tablet by mouth 2 times daily     MULTI-VITAMIN DAILY PO     nitroGLYCERIN 0.4 MG SL tablet  Commonly known as:  Nitrostat  Place 1 tablet under the tongue every 5 minutes as needed for Chest pain up to max of 3 total doses. If no relief after 1 dose, call 911. Consults:  orthopedic surgery    Significant Diagnostic Studies:  SEE BELOW    Labs:  Na/K/Cl/CO2:  139/4.3/101/25 (10/03 3039)  BUN/Cr/glu/ALT/AST/amyl/lip:  30/1.4/--/--/--/--/-- (10/03 5991)  WBC/Hgb/Hct/Plts:  8.7/15.0/45.0/179 (10/03 4367)  [unfilled]  estimated creatinine clearance is 36 mL/min (A) (based on SCr of 1.4 mg/dL (H)). New Imaging:  Ct Hip Right Wo Contrast    Result Date: 10/2/2020  EXAMINATION: CT OF THE RIGHT HIP WITHOUT CONTRAST 10/2/2020 7:51 pm TECHNIQUE: CT of the right hip was performed without the administration of intravenous contrast.  Multiplanar reformatted images are provided for review. Dose modulation, iterative reconstruction, and/or weight based adjustment of the mA/kV was utilized to reduce the radiation dose to as low as reasonably achievable. COMPARISON: Hip radiographs dated 10/02/2020. [facility] HISTORY ORDERING SYSTEM PROVIDED HISTORY: trauma TECHNOLOGIST PROVIDED HISTORY: Reason for exam:->trauma What reading provider will be dictating this exam?->CRC FINDINGS: Moderate right hip degenerative changes. Minimally displaced fractures of the right superior and inferior pubic rami near the pubic symphysis. The right proximal femur is intact. Acetabulum appears intact on CT images. Minimally displaced right superior and inferior pubic rami fractures. Xr Hip 2-3 Vw W Pelvis Right    Result Date: 10/2/2020  EXAMINATION: ONE XRAY VIEW OF THE PELVIS AND TWO XRAY VIEWS RIGHT HIP 10/2/2020 6:34 pm COMPARISON: None.  HISTORY: ORDERING SYSTEM PROVIDED HISTORY: fall TECHNOLOGIST PROVIDED HISTORY: Reason for exam:->fall What reading provider will be dictating this exam?->CRC FINDINGS: The hips are symmetric in appearance. There is no evidence of acute fracture or dislocation. Mild diffuse osteopenia. The pubic rami are intact. The sacrum is within normal limits. No radiographic evidence of acute pelvic or hip fracture. Treatments:   none    Discharge Exam:   Physical Exam:  · Vitals: BP (!) 141/68   Pulse 60   Temp 97.8 °F (36.6 °C) (Temporal)   Resp 17   Ht 5' 7\" (1.702 m)   Wt 155 lb (70.3 kg)   SpO2 93%   BMI 24.28 kg/m²     · I & O - 24hr: No intake/output data recorded. Physical Exam  Constitutional:       General: He is not in acute distress.     Appearance: Normal appearance. He is normal weight. HENT:      Head: Normocephalic and atraumatic. Eyes:      Extraocular Movements: Extraocular movements intact.      Pupils: Pupils are equal, round, and reactive to light. Cardiovascular:      Rate and Rhythm: Normal rate and regular rhythm.      Pulses: Normal pulses.      Heart sounds: Normal heart sounds. No murmur. Pulmonary:      Effort: Pulmonary effort is normal. No respiratory distress.      Breath sounds: Normal breath sounds. No wheezing. Abdominal:      General: Abdomen is flat. Bowel sounds are normal. There is no distension.      Palpations: Abdomen is soft.      Tenderness: There is no abdominal tenderness. Musculoskeletal:         General: No tenderness or deformity.      Right lower leg: No edema.      Left lower leg: No edema.      Comments: Decreased range of motion of the right hip due to pain.   Neurological:      General: No focal deficit present.      Mental Status: He is alert and oriented to person, place, and time.  Mental status is at baseline.      Sensory: Sensation is intact.      Motor: Motor function is intact. Subject    Disposition:   home    Future Appointments   Date Time Provider Divya Sury   12/14/2020  3:20 PM Eris Jacobs MD Juventino Males [facility]       More than 30 minutes was spent in preparation of this patient's discharge including, but not limited to, examination, preparation of documents, prescription preparation, counseling and coordination.     Signed:  Ja Lopez MD  10/3/2020, 9:47 AM

## 2020-10-04 NOTE — CONSULTS
Orthopaedic Consultation  GERONIMO Bautista MD    Reason for Consult:  Right pelvic pain     HISTORY OF PRESENT ILLNESS:                                     Patient is a 80 y.o. male who presents with a chief complaint of right pelvic pain. Patient states that earlier today he fell down 2 steps and landed on his side and had immediate pain afterwards. Patient denies any Nezhat or loss of consciousness or any current headache. The patient states he does take 81 mg of ASA every day. The patient states he was able to walk after the fall and walked back into the house and then again walked from the car into the emergency department. The patient states he does have pain with walking. Patient denies use of walker or cane for assistance during walking. Denies numbness/tingling/paresthesias.   Denies any other orthopedic complaints at this time.        Past Medical History:    Past Medical History             Diagnosis Date    Arthritis       Fingers and knees bilaterally    CAD (coronary artery disease)      CKD (chronic kidney disease) stage 3, GFR 30-59 ml/min 6/8/2020    GERD (gastroesophageal reflux disease) 2013    Hyperlipidemia      Hypertension      Myocardial infarct, old 1999    Renal insufficiency      Valvular heart disease          Past Surgical History:    Past Surgical History             Procedure Laterality Date    APPENDECTOMY   1960    CARDIAC CATHETERIZATION   10/2006     left main 20% ostial, LAD 20% prox, first diag 40% ostial, LCX 30%prox & 30%mid, RCA no significant disease    CARDIAC CATHETERIZATION   11/20/2011     patent stent    CARDIAC CATHETERIZATION   02/05/2016     patent stents, LM 20% ostial, Cx luminal irregularities--medical management    CARDIOVASCULAR STRESS TEST   10/03/2011     nuclear    CARDIOVASCULAR STRESS TEST   01/25/2016    CORONARY ANGIOPLASTY WITH STENT PLACEMENT Left 1999     stent to RCA  @ [facility]    TRANSTHORACIC ECHOCARDIOGRAM   01/24/2016     EF 55%, 1+TR & 1+ MR        Current Medications:     Current Hospital Medications   Current Facility-Administered Medications: aspirin chewable tablet 81 mg, 81 mg, Oral, Daily  atorvastatin (LIPITOR) tablet 40 mg, 40 mg, Oral, Daily  lisinopril (PRINIVIL;ZESTRIL) tablet 20 mg, 20 mg, Oral, Daily  hydroCHLOROthiazide (HYDRODIURIL) tablet 12.5 mg, 12.5 mg, Oral, QAM  isosorbide mononitrate (IMDUR) extended release tablet 60 mg, 60 mg, Oral, Daily  metoprolol tartrate (LOPRESSOR) tablet 100 mg, 100 mg, Oral, BID  therapeutic multivitamin-minerals 1 tablet, 1 tablet, Oral, Daily  nitroGLYCERIN (NITROSTAT) SL tablet 0.4 mg, 0.4 mg, Sublingual, Q5 Min PRN  sodium chloride flush 0.9 % injection 10 mL, 10 mL, Intravenous, 2 times per day  sodium chloride flush 0.9 % injection 10 mL, 10 mL, Intravenous, PRN  acetaminophen (TYLENOL) tablet 650 mg, 650 mg, Oral, Q6H PRN **OR** acetaminophen (TYLENOL) suppository 650 mg, 650 mg, Rectal, Q6H PRN  polyethylene glycol (GLYCOLAX) packet 17 g, 17 g, Oral, Daily PRN  promethazine (PHENERGAN) tablet 12.5 mg, 12.5 mg, Oral, Q6H PRN **OR** ondansetron (ZOFRAN) injection 4 mg, 4 mg, Intravenous, Q6H PRN     Allergies:  Patient has no known allergies.     Social History:   TOBACCO:   reports that he has never smoked. He has never used smokeless tobacco.  ETOH:   reports previous alcohol use. DRUGS:   reports no history of drug use.   ACTIVITIES OF DAILY LIVING:    OCCUPATION:    Family History:   Family History             Problem Relation Age of Onset    Cancer Mother      High Blood Pressure Mother      High Cholesterol Mother      Cirrhosis Father             REVIEW OF SYSTEMS:  CONSTITUTIONAL:  negative for  fevers, chills  EYES:  negative for blurred vision, visual disturbance  HEENT:  negative for  hearing loss, voice change  RESPIRATORY:  negative for  dyspnea, wheezing  CARDIOVASCULAR:  negative for  chest pain, palpitations  GASTROINTESTINAL:  negative for nausea, vomiting  GENITOURINARY:  negative for frequency, urinary incontinence  HEMATOLOGIC/LYMPHATIC:  negative for bleeding and petechiae  MUSCULOSKELETAL:  positive for  pain  NEUROLOGICAL:  negative for headaches, dizziness  BEHAVIOR/PSYCH:  negative for increased agitation and anxiety     PHYSICAL EXAM:    VITALS:  BP (!) 121/58   Pulse 55   Temp 98.2 °F (36.8 °C) (Temporal)   Resp 17   Ht 5' 7\" (1.702 m)   Wt 155 lb (70.3 kg)   SpO2 95%   BMI 24.28 kg/m²   CONSTITUTIONAL:  awake, alert, cooperative, no apparent distress, and appears stated age  MUSCULOSKELETAL:  Right lower Extremity:  · Skin intact circumferentially the right lower extremity  · Compartments soft and compressible, calf non-tender  · +DP & PT pulses, Brisk Cap refill, Toes warm and perfused  · Sensation grossly intact superficial/deep peroneal,saphenous,sural,tibial n. distributions  · +GS/TA/EHL. · Patient is tenderness to palpation of the right pubic rami. Patient is able to stand with some pain. · Negative logroll for pain     Secondary Exam:   bilateralUE: No obvious signs of trauma. -TTP to fingers, hand, wrist, forearm, elbow, humerus, shoulder or clavicle.     leftLE: No obvious signs of trauma. -TTP to foot, ankle, leg, knee, thigh, hip        DATA:    CBC:         Lab Results   Component Value Date     WBC 11.8 10/02/2020     RBC 4.99 10/02/2020     HGB 15.7 10/02/2020     HCT 47.0 10/02/2020     MCV 94.2 10/02/2020     MCH 31.5 10/02/2020     MCHC 33.4 10/02/2020     RDW 12.4 10/02/2020      10/02/2020     MPV 10.1 10/02/2020      PT/INR:  No results found for: PROTIME, INR  CRP/ESR: No results found for: CRP, SEDRATE  Lactic Acid : No results found for: LACTA     Radiology Review:  10/03/20 - XR were obtained and is hard to appreciate any pubic rami fractures on these views. CT scan from today was reviewed and demonstrates nondisplaced superior inferior pubic rami fractures on the right.   No other fractures or dislocations noted about the proximal femur or pelvis.     IMPRESSION:   · Right. Inferior pubic rami fractures     PLAN:  · WBAT - RLE  · Pain Control per primary  · Recommend PT/OT   · Continue ice and elevation to decrease swelling  · The patient's fractures are able to be treated nonsurgically the patient may weight-bear as tolerated to the right lower extremity. No further acute intervention is needed at this time the patient may follow-up with Dr. Adeel Phillips as an outpatient.   · Follow up in the office in 3 weeks

## 2020-10-12 RX ORDER — BENAZEPRIL HYDROCHLORIDE 20 MG/1
20 TABLET ORAL 2 TIMES DAILY
Qty: 180 TABLET | Refills: 3 | Status: SHIPPED
Start: 2020-10-12 | End: 2021-06-14 | Stop reason: SDUPTHER

## 2020-10-12 RX ORDER — HYDROCHLOROTHIAZIDE 12.5 MG/1
12.5 CAPSULE, GELATIN COATED ORAL EVERY MORNING
Qty: 90 CAPSULE | Refills: 3 | Status: SHIPPED
Start: 2020-10-12 | End: 2021-06-14 | Stop reason: SDUPTHER

## 2020-11-13 ENCOUNTER — OFFICE VISIT (OUTPATIENT)
Dept: FAMILY MEDICINE CLINIC | Age: 85
End: 2020-11-13
Payer: MEDICARE

## 2020-11-13 VITALS
HEIGHT: 66 IN | TEMPERATURE: 97.7 F | HEART RATE: 61 BPM | WEIGHT: 154 LBS | OXYGEN SATURATION: 96 % | BODY MASS INDEX: 24.75 KG/M2 | DIASTOLIC BLOOD PRESSURE: 82 MMHG | SYSTOLIC BLOOD PRESSURE: 152 MMHG

## 2020-11-13 PROCEDURE — 93010 ELECTROCARDIOGRAM REPORT: CPT | Performed by: FAMILY MEDICINE

## 2020-11-13 PROCEDURE — 93005 ELECTROCARDIOGRAM TRACING: CPT | Performed by: FAMILY MEDICINE

## 2020-11-13 PROCEDURE — 99213 OFFICE O/P EST LOW 20 MIN: CPT | Performed by: FAMILY MEDICINE

## 2020-11-13 RX ORDER — MECLIZINE HCL 12.5 MG/1
12.5 TABLET ORAL 3 TIMES DAILY PRN
Qty: 15 TABLET | Refills: 0 | Status: SHIPPED | OUTPATIENT
Start: 2020-11-13 | End: 2020-11-23

## 2020-11-13 NOTE — PATIENT INSTRUCTIONS
Patient Education        Epley Maneuver at Home for Vertigo: Exercises  Introduction  Vertigo is a spinning or whirling sensation when you move your head. Your doctor may have moved you in different positions to help your vertigo get better faster. This is called the Epley maneuver. Your doctor also may have asked you to do these exercises at home. Do the exercises as often as your doctor recommends. If your vertigo is getting worse, your doctor may have you change the exercise or stop it. Step 1  Step 1   1. Sit on the edge of a bed or sofa. Step 2   1. Turn your head 45 degrees in the direction your doctor told you to. This should be toward the ear that causes the most vertigo for you. In this picture, the woman is turning toward her left ear. Step 3   1. Tilt yourself backward until you are lying on your back. Your head should still be at a 45-degree turn. Your head should be about midway between looking straight ahead and looking out to your side. Hold for 30 seconds. If you have vertigo, stay in this position until it stops. Step 4   1. Turn your head 90 degrees toward the ear that has the least vertigo. In this picture, the woman is turning to the right because she has vertigo on her left side. The point of your chin should be raised and over your shoulder. Hold for 30 seconds. Step 5   1. Roll onto the side with the least vertigo. You should now be looking at the floor. Hold for 30 seconds. Follow-up care is a key part of your treatment and safety. Be sure to make and go to all appointments, and call your doctor if you are having problems. It's also a good idea to know your test results and keep a list of the medicines you take. Where can you learn more? Go to https://[email or web address].[email or web address]. org and sign in to your [employer] account. Enter L213 in the [employer] box to learn more about \"Epley Maneuver at Home for Vertigo: Exercises. \"     If you do not have an account, please click on the \"Sign Up Now\" link. Current as of: November 20, 2019               Content Version: 12.6  © 1142-9507 [employer]. Care instructions adapted under license by [facility] ([facility]). If you have questions about a medical condition or this instruction, always ask your healthcare professional. [address] any warranty or liability for your use of this information.

## 2020-11-13 NOTE — PROGRESS NOTES
[facility] MEDICINE RESIDENCY PROGRAM  DATE OF VISIT : 2020    Patient : Philomena Singh   Age : 80 y.o.  : 1935   MRN : <[id number]>   ______________________________________________________________________    Chief Complaint :   Chief Complaint   Patient presents with    Dizziness     x 3 days       HPI : Philomena Singh is 80 y.o. male who presented to the clinic today for dizziness same day visit. Patient has reported ongoing dizziness over the last couple of days, sensation described as the room spinning. Symptoms are made worse with change of position of his head and changing position in bed. Does not endorse the symptoms when changing from standing or sitting position. Patient does endorse history of vertigo in the past, treated with meclizine with improvement. Patient denies any hearing loss or tinnitus. Patient does endorse occasional \"twinge\" in his chest, no pressure or pain sensation, not associated with shortness of breath or diaphoresis.         Past Medical History :  Past Medical History:   Diagnosis Date    Arthritis     Fingers and knees bilaterally    CAD (coronary artery disease)     CKD (chronic kidney disease) stage 3, GFR 30-59 ml/min 2020    GERD (gastroesophageal reflux disease)     Hyperlipidemia     Hypertension     Myocardial infarct, old     Renal insufficiency     Valvular heart disease      Past Surgical History:   Procedure Laterality Date    APPENDECTOMY  1960    CARDIAC CATHETERIZATION  10/2006    left main 20% ostial, LAD 20% prox, first diag 40% ostial, LCX 30%prox & 30%mid, RCA no significant disease    CARDIAC CATHETERIZATION  2011    patent stent    CARDIAC CATHETERIZATION  2016    patent stents, LM 20% ostial, Cx luminal irregularities--medical management    CARDIOVASCULAR STRESS TEST  10/03/2011    nuclear    CARDIOVASCULAR STRESS TEST  2016    CORONARY ANGIOPLASTY WITH STENT PLACEMENT Left 1999    stent to RCA  @ [facility]    TRANSTHORACIC ECHOCARDIOGRAM  01/24/2016    EF 55%, 1+TR & 1+ MR         Review of Systems :    ROS - Per HPI   ______________________________________________________________________    Physical Exam :    Vitals: BP (!) 152/82 (Site: Right Upper Arm, Position: Standing)   Pulse 61   Temp 97.7 °F (36.5 °C)   Ht 5' 6\" (1.676 m)   Wt 154 lb (69.9 kg)   SpO2 96%   BMI 24.86 kg/m²   GENERAL: Alert, cooperative, no acute distress. CHEST: No tenderness or deformity, full & symmetric excursion  LUNG: Clear to auscultation bilaterally,  respirations unlabored. No rales/wheezing/rubs  HEART: RRR, S1 and S2 normal, no murmur, rub or gallop. DP pulses 2/4  ABDOMEN: SNTND, no masses, no organomegaly, no guarding, rebound or rigidity. EXTREMITIES:  Extremities normal, atraumatic, no cyanosis or edema. Distal pulses equal bilaterally  HEENT: mild b/l cerumen. [place] hallpike with reproducible sx on right, no nystagmus b/l  ______________________________________________________________________    Assessment & Plan :     Diagnosis Orders   1. Vertigo     2. Coronary artery disease involving native coronary artery of native heart without angina pectoris  EKG 12 lead    EKG 12 lead     Dizziness: Likely vertigo given My-Hallpike and symptoms described, less concern for cardiac etiology, EKG unchanged. Trial meclizine and Epley maneuvers. Follow-up with PCP in few weeks for resolution of symptoms. Signs and symptoms warranting urgent/emergent evaluation reviewed with patient.     RTC: 3 weeks for symptoms f/u with PCP    Juan Brown MD PGY-2    Discussed with: Dr. Marcin Garnica

## 2020-11-20 RX ORDER — ISOSORBIDE MONONITRATE 60 MG/1
60 TABLET, EXTENDED RELEASE ORAL DAILY
Qty: 90 TABLET | Refills: 3 | Status: SHIPPED
Start: 2020-11-20 | End: 2021-06-14 | Stop reason: SDUPTHER

## 2020-11-20 RX ORDER — ATORVASTATIN CALCIUM 40 MG/1
40 TABLET, FILM COATED ORAL DAILY
Qty: 90 TABLET | Refills: 3 | Status: SHIPPED
Start: 2020-11-20 | End: 2021-06-14 | Stop reason: SDUPTHER

## 2020-11-20 NOTE — TELEPHONE ENCOUNTER
Last Appointment:  6/8/2020  Future Appointments   Date Time Provider Divya Ga   12/14/2020  3:20 PM MD Fer Porras [facility]

## 2020-12-14 ENCOUNTER — OFFICE VISIT (OUTPATIENT)
Dept: FAMILY MEDICINE CLINIC | Age: 85
End: 2020-12-14
Payer: MEDICARE

## 2020-12-14 VITALS — HEIGHT: 66 IN | RESPIRATION RATE: 16 BRPM | BODY MASS INDEX: 25.23 KG/M2 | WEIGHT: 157 LBS

## 2020-12-14 PROCEDURE — 99214 OFFICE O/P EST MOD 30 MIN: CPT | Performed by: FAMILY MEDICINE

## 2020-12-14 PROCEDURE — 99212 OFFICE O/P EST SF 10 MIN: CPT | Performed by: FAMILY MEDICINE

## 2020-12-14 RX ORDER — METOPROLOL TARTRATE 100 MG/1
100 TABLET ORAL 2 TIMES DAILY
Qty: 180 TABLET | Refills: 3 | Status: SHIPPED
Start: 2020-12-14 | End: 2021-06-14 | Stop reason: SDUPTHER

## 2020-12-14 NOTE — PATIENT INSTRUCTIONS
Patient Education        Preventing Falls: Care Instructions  Your Care Instructions     Getting around your home safely can be a challenge if you have injuries or health problems that make it easy for you to fall. Loose rugs and furniture in walkways are among the dangers for many older people who have problems walking or who have poor eyesight. People who have conditions such as arthritis, osteoporosis, or dementia also have to be careful not to fall. You can make your home safer with a few simple measures. Follow-up care is a key part of your treatment and safety. Be sure to make and go to all appointments, and call your doctor if you are having problems. It's also a good idea to know your test results and keep a list of the medicines you take. How can you care for yourself at home? Taking care of yourself  · You may get dizzy if you do not drink enough water. To prevent dehydration, drink plenty of fluids, enough so that your urine is light yellow or clear like water. Choose water and other caffeine-free clear liquids. If you have kidney, heart, or liver disease and have to limit fluids, talk with your doctor before you increase the amount of fluids you drink. · Exercise regularly to improve your strength, muscle tone, and balance. Walk if you can. Swimming may be a good choice if you cannot walk easily. · Have your vision and hearing checked each year or any time you notice a change. If you have trouble seeing and hearing, you might not be able to avoid objects and could lose your balance. · Know the side effects of the medicines you take. Ask your doctor or pharmacist whether the medicines you take can affect your balance. Sleeping pills or sedatives can affect your balance. · Limit the amount of alcohol you drink. Alcohol can impair your balance and other senses. · Ask your doctor whether calluses or corns on your feet need to be removed. If you wear loose-fitting shoes because of calluses or corns, you can lose your balance and fall. · Talk to your doctor if you have numbness in your feet. Preventing falls at home  · Remove raised doorway thresholds, throw rugs, and clutter. Repair loose carpet or raised areas in the floor. · Move furniture and electrical cords to keep them out of walking paths. · Use nonskid floor wax, and wipe up spills right away, especially on ceramic tile floors. · If you use a walker or cane, put rubber tips on it. If you use crutches, clean the bottoms of them regularly with an abrasive pad, such as steel wool. · Keep your house well lit, especially [facility], and outside walkways. Use night-lights in areas such as hallways and bathrooms. Add extra light switches or use remote switches (such as switches that go on or off when you clap your hands) to make it easier to turn lights on if you have to get up during the night. · Install sturdy handrails on stairways. · Move items in your cabinets so that the things you use a lot are on the lower shelves (about waist level). · Keep a cordless phone and a flashlight with new batteries by your bed. If possible, put a phone in each of the main rooms of your house, or carry a cell phone in case you fall and cannot reach a phone. Or, you can wear a device around your neck or wrist. You push a button that sends a signal for help. · Wear low-heeled shoes that fit well and give your feet good support. Use footwear with nonskid soles. Check the heels and soles of your shoes for wear. Repair or replace worn heels or soles. · Do not wear socks without shoes on wood floors. · Walk on the grass when the sidewalks are slippery. If you live in an area that gets snow and ice in the winter, sprinkle salt on slippery steps and sidewalks.   Preventing falls in the bath · Install grab bars and nonskid mats inside and outside your shower or tub and near the toilet and sinks. · Use shower chairs and bath benches. · Use a hand-held shower head that will allow you to sit while showering. · Get into a tub or shower by putting the weaker leg in first. Get out of a tub or shower with your strong side first.  · Repair loose toilet seats and consider installing a raised toilet seat to make getting on and off the toilet easier. · Keep your bathroom door unlocked while you are in the shower. Where can you learn more? Go to [email or web address] org and sign in to your [employer] account. Enter [phone] [id number] [phone] in the [facility] box to learn more about \"Preventing Falls: Care Instructions. \"     If you do not have an account, please click on the \"Sign Up Now\" link. Current as of: April 15, 2020               Content Version: 12.6  © 4541-4111 [employer]. Care instructions adapted under license by [facility] ([facility]). If you have questions about a medical condition or this instruction, always ask your healthcare professional. [place] [address] any warranty or liability for your use of this information.

## 2020-12-14 NOTE — PROGRESS NOTES
[facility]  FAMILY MEDICINE RESIDENCY PROGRAM   OFFICE PROGRESS NOTE  DATE OF VISIT : 2020    Patient : Yessenia Burns   Sex : male   Age : 80 y.o.  : 1935   MRN : <[id number]>            Chief Complaint :   Chief Complaint   Patient presents with    Hyperlipidemia    Hypertension       HPI:   Yessenia Burns comes to clinic today for     F/U of chronic problem(s) and new or recent complaint of Fractured pelvis     Chronic problems reviewed today include:     Hypertension, Hyperlipidemia, Coronary artery disease, Chronic kidney disease and GERD    Current status of this/these condition(s):  stable    Tolerating meds: Yes    Additional history: Mr. Ishaan Shoemaker is here for follow-up on his chronic conditions. He sustained a mechanical fall in early October. He was admitted to the hospital overnight and found to have a nondisplaced pelvic fracture. No treatment was required, and he was able to leave on his own ambulation. Since then, he has received some home physical therapy and continues to do daily exercises. He has had no further falls and denies any significant complaints regarding that injury. His chronic problems are stable and currently asymptomatic. He is tolerating the medications without difficulty. He has no new complaints. He denies headaches, dizziness, or blurred vision. He does admit to decreased hearing. He did have one episode of significant dizziness for which she was seen in the office. It was felt this was likely positional vertigo, but it is since resolved completely. He denies chest pain or palpitations. He denies cough, shortness of breath, diarrhea, or constipation. He does get reflux symptoms if he does not take his medication.      Patient Active Problem List   Diagnosis    Coronary artery disease involving native coronary artery of native heart without angina pectoris    Gastroesophageal reflux disease without esophagitis  Essential hypertension    Mixed hyperlipidemia    Benign non-nodular prostatic hyperplasia with lower urinary tract symptoms    First-degree atrioventricular block    CKD (chronic kidney disease) stage 3, GFR 30-59 ml/min    Fracture of multiple pubic rami, right, closed, initial encounter ([facility] [address]    Presbycusis    Spondylosis of cervical region without myelopathy or radiculopathy       Past Medical History:   Diagnosis Date    Arthritis     Fingers and knees bilaterally    CAD (coronary artery disease)     CKD (chronic kidney disease) stage 3, GFR 30-59 ml/min 6/8/2020    GERD (gastroesophageal reflux disease) 2013    Hyperlipidemia     Hypertension     Myocardial infarct, old 1999    Renal insufficiency     Valvular heart disease        Current Outpatient Medications on File Prior to Visit   Medication Sig Dispense Refill    atorvastatin (LIPITOR) 40 MG tablet Take 1 tablet by mouth daily 90 tablet 3    isosorbide mononitrate (IMDUR) 60 MG extended release tablet Take 1 tablet by mouth daily 90 tablet 3    hydroCHLOROthiazide (MICROZIDE) 12.5 MG capsule Take 1 capsule by mouth every morning 90 capsule 3    benazepril (LOTENSIN) 20 MG tablet Take 1 tablet by mouth 2 times daily 180 tablet 3    nitroGLYCERIN (NITROSTAT) 0.4 MG SL tablet Place 1 tablet under the tongue every 5 minutes as needed for Chest pain up to max of 3 total doses. If no relief after 1 dose, call 911. 25 tablet 3    metoprolol (LOPRESSOR) 100 MG tablet Take 1 tablet by mouth 2 times daily 180 tablet 3    Multiple Vitamin (MULTI-VITAMIN DAILY PO) Take by mouth daily      aspirin 81 MG tablet Take 81 mg by mouth daily       No current facility-administered medications on file prior to visit.         No Known Allergies    Family History   Problem Relation Age of Onset    Cancer Mother     High Blood Pressure Mother     High Cholesterol Mother     Cirrhosis Father        Past Surgical History:   Procedure Laterality Date 2000 [facility] CARDIAC CATHETERIZATION  10/2006    left main 20% ostial, LAD 20% prox, first diag 40% ostial, LCX 30%prox & 30%mid, RCA no significant disease    CARDIAC CATHETERIZATION  11/20/2011    patent stent    CARDIAC CATHETERIZATION  02/05/2016    patent stents, LM 20% ostial, Cx luminal irregularities--medical management    CARDIOVASCULAR STRESS TEST  10/03/2011    nuclear    CARDIOVASCULAR STRESS TEST  01/25/2016    CORONARY ANGIOPLASTY WITH STENT PLACEMENT Left 1999    stent to RCA  @ [facility]    TRANSTHORACIC ECHOCARDIOGRAM  01/24/2016    EF 55%, 1+TR & 1+ MR       Social History     Tobacco Use    Smoking status: Never Smoker    Smokeless tobacco: Never Used   Substance Use Topics    Alcohol use: Not Currently    Drug use: Never       Review of Systems - Negative except as per HPI    Objective:    VS:  Resp. rate 16, height 5' 6\" (1.676 m), weight 157 lb (71.2 kg). General Appearance: Well developed, awake, alert, oriented, no acute distress  HEENT: Normocephalic,atraumatic. PERRL, EOM's intact, EAC without erythema or swelling, no pallor or icterus. Neck: Supple, symmetrical, trachea midline. No JVD. Thyroid smooth, not enlarged. Chest wall/Lung: Clear to auscultation bilaterally,  respirations unlabored. No rhonchi/wheezing/rales  Heart[de-identified]  Regular rate and rhythm, S1and S2 normal, no murmur, rub or gallop. Extremities:  Extremities normal, atraumatic, no cyanosis. no edema. Skin: Skin color, texture, turgor normal, no rashes or lesions  Musculoskeletal: He was able to rise from a seated position walk to the door turnaround and returned to a city seated position without any difficulty today. He did this smoothly and without delays. Neurologic:    Alert, oriented. Normal gait and coordination   No focal neurologic deficits noted. Psychiatric: has a normal mood and affect. Behavior is normal.     Most recent labs and imaging reviewed.   Findings include: I reviewed the labs from his hospitalization in October. There were no significant abnormalities. Scott Clancy was seen today for hyperlipidemia and hypertension. Diagnoses and all orders for this visit:    At high risk for falls    Essential hypertension    Coronary artery disease involving native coronary artery of native heart without angina pectoris    Gastroesophageal reflux disease without esophagitis    Mixed hyperlipidemia    Stage 3a chronic kidney disease        Additional Plan: He is to continue all of his current medications. His GFR and creatinine have remained stable, so I will continue to monitor that. Should it show significant worsening, I would make a referral to nephrology. He will continue to follow with cardiology as they recommend. He continues to work 3 to 4 days a week, but is considering retiring soon. RTO in in 6 month(s) or sooner for any persistent, new, or worsening symptoms. Please see Patient Instructions for further counseling and information given. Advised to be adherent to the treatment plans discussed today, and please call with any questions or concerns, letting the office know of any reasons that the plans may not be followed. The risks of untreated conditions include worsening illness, injury, disability, and possibly, death. Please call if symptoms change in any way, worsen, or fail to completely resolve, as this could necessitate a change to treatment plans. Patient and/or caregiver expressed understanding. Indications and proper use of medication(s) reviewed. Potential side-effects and risks of medication(s) also explained. Patient and/or caregiver was instructed to call if any new symptoms develop prior to next visit. Health risk factors discussed and addressed. I have personally reviewed labs and/or imaging (if any). Signed by : WESLY Carlisle On the basis of positive falls risk screening, assessment and plan is as follows: in-office gait and balance testing performed using The Timed Up and Go Test was negative for increased falls risk- no further intervention is currently indicated.

## 2021-01-21 ENCOUNTER — IMMUNIZATION (OUTPATIENT)
Dept: PRIMARY CARE CLINIC | Age: 86
End: 2021-01-21
Payer: MEDICARE

## 2021-01-21 PROCEDURE — 91301 COVID-19, MODERNA VACCINE 100MCG/0.5ML DOSE: CPT | Performed by: NURSE PRACTITIONER

## 2021-01-21 PROCEDURE — 0011A COVID-19, MODERNA VACCINE 100MCG/0.5ML DOSE: CPT | Performed by: NURSE PRACTITIONER

## 2021-02-18 ENCOUNTER — IMMUNIZATION (OUTPATIENT)
Dept: PRIMARY CARE CLINIC | Age: 86
End: 2021-02-18
Payer: MEDICARE

## 2021-02-18 PROCEDURE — 91301 COVID-19, MODERNA VACCINE 100MCG/0.5ML DOSE: CPT | Performed by: NURSE PRACTITIONER

## 2021-02-18 PROCEDURE — 0012A COVID-19, MODERNA VACCINE 100MCG/0.5ML DOSE: CPT | Performed by: NURSE PRACTITIONER

## 2021-06-03 ENCOUNTER — OFFICE VISIT (OUTPATIENT)
Dept: FAMILY MEDICINE CLINIC | Age: 86
End: 2021-06-03
Payer: MEDICARE

## 2021-06-03 VITALS
DIASTOLIC BLOOD PRESSURE: 66 MMHG | HEART RATE: 63 BPM | OXYGEN SATURATION: 98 % | HEIGHT: 66 IN | SYSTOLIC BLOOD PRESSURE: 153 MMHG | TEMPERATURE: 98 F | BODY MASS INDEX: 25.23 KG/M2 | WEIGHT: 157 LBS

## 2021-06-03 DIAGNOSIS — E78.2 MIXED HYPERLIPIDEMIA: Chronic | ICD-10-CM

## 2021-06-03 DIAGNOSIS — K21.9 GASTROESOPHAGEAL REFLUX DISEASE WITHOUT ESOPHAGITIS: Chronic | ICD-10-CM

## 2021-06-03 DIAGNOSIS — I10 ESSENTIAL HYPERTENSION: Chronic | ICD-10-CM

## 2021-06-03 DIAGNOSIS — S76.211A STRAIN OF GROIN, RIGHT, INITIAL ENCOUNTER: ICD-10-CM

## 2021-06-03 DIAGNOSIS — I25.10 CORONARY ARTERY DISEASE INVOLVING NATIVE CORONARY ARTERY OF NATIVE HEART WITHOUT ANGINA PECTORIS: Primary | Chronic | ICD-10-CM

## 2021-06-03 DIAGNOSIS — N18.31 STAGE 3A CHRONIC KIDNEY DISEASE (HCC): Chronic | ICD-10-CM

## 2021-06-03 PROBLEM — S32.591A FRACTURE OF MULTIPLE PUBIC RAMI, RIGHT, CLOSED, INITIAL ENCOUNTER (HCC): Status: RESOLVED | Noted: 2020-10-02 | Resolved: 2021-06-03

## 2021-06-03 PROCEDURE — 99212 OFFICE O/P EST SF 10 MIN: CPT | Performed by: FAMILY MEDICINE

## 2021-06-03 PROCEDURE — 99214 OFFICE O/P EST MOD 30 MIN: CPT | Performed by: FAMILY MEDICINE

## 2021-06-03 NOTE — PROGRESS NOTES
[facility]  [facility]  FAMILY MEDICINE RESIDENCY PROGRAM   OFFICE PROGRESS NOTE  DATE OF VISIT : 6/3/2021         Chief Complaint :   Chief Complaint   Patient presents with    Hernia     possible, lower right side       HPI:   Rajat Leon comes to clinic today for     F/U of chronic problem(s) and new or recent complaint of right groin strain     Chronic problems reviewed today include:     Hypertension, Hyperlipidemia, Coronary artery disease, Chronic kidney disease and GERD    Current status of this/these condition(s):  stable    Tolerating meds: Yes and stopped flomax (not helping)    Additional history: Injured himself doing some yard work about 3 weeks ago. Felt pain in lower right inguinal region. Now has discomfort or burning intermittently in that area, more when active. No obvious swelling. Does think that it is getting better. Denies bowel problems, but still has chronic urinary frequency and urgency. Denies chest pain, palpitations, dyspnea, or other symptoms. Walking up to 4 miles daily, and still works four days a week, but considering [facility] or at least cutting back. Planning a trip to [place] with family in July. Objective:    VS:  Blood pressure (!) 153/66, pulse 63, temperature 98 °F (36.7 °C), temperature source Temporal, height 5' 6\" (1.676 m), weight 157 lb (71.2 kg), SpO2 98 %. General Appearance: Well developed, awake, alert, oriented, no acute distress  HEENT: Normocephalic,atraumatic. PERRL, EOM's intact, EAC without erythema or swelling, no pallor or icterus. Neck: Supple, symmetrical, trachea midline. No JVD. Thyroid smooth, not enlarged. Chest wall/Lung: Clear to auscultation bilaterally,  respirations unlabored. No rhonchi/wheezing/rales  Heart[de-identified]  Regular rate and rhythm, S1and S2 normal, no murmur, rub or gallop. Abdomen: Soft, nontender.   Normal BS, no hepatosplenomegaly or mass  Gen:  Testes descended, no definite hernia, although right inguinal region slightly swollen. Minimal tenderness  Extremities:  Extremities normal, atraumatic, no cyanosis. no edema. Skin: multiple actinic damaged areas and scars from previous skin surgeries. Neurologic:    Alert, oriented. Normal gait and coordination   No focal neurologic deficits noted. Psychiatric: has a normal mood and affect. Behavior is normal.     I independently reviewed the following information:  historical medical records and lab reports    1. Coronary artery disease involving native coronary artery of native heart without angina pectoris  2. Essential hypertension  -     Comprehensive Metabolic Panel; Future  3. Gastroesophageal reflux disease without esophagitis  4. Stage 3a chronic kidney disease  5. Mixed hyperlipidemia  -     LIPID PANEL; Future  6. Strain of groin, right, initial encounter      Additional Plan:  Reassured regarding inguinal issue. I don't think that there is a hernia, and have advised him that he can continue any activity that does not significantly increase his pain. He should notify us if this worsens or if he notices bulging, although I don't believe that this would REQUIRE surgery. I am going to have him monitor BP over the next few weeks. It may not be worth it to add additional medication if his BP is only mildly elevated, but we will make that decision at the next visit  Labs were obtained today. He could schedule an AWV sometime this year as well. He will follow with dermatology for his skin lesions. On this date 6/3/2021 I have spent 34 minutes reviewing previous notes, test results and face to face with the patient discussing the diagnosis and importance of compliance with the treatment plan as well as documenting on the day of the visit. RTO in in 2 week(s) or sooner for any persistent, new, or worsening symptoms. Please see Patient Instructions for further counseling and information given.        Advised to be adherent to the treatment plans discussed today, and please call with any questions or concerns, letting the office know of any reasons that the plans may not be followed. The risks of untreated conditions include worsening illness, injury, disability, and possibly, death. Please call if symptoms change in any way, worsen, or fail to completely resolve, as this could necessitate a change to treatment plans. Patient and/or caregiver expressed understanding. Indications and proper use of medication(s) reviewed. Potential side-effects and risks of medication(s) also explained. Patient and/or caregiver was instructed to call if any new symptoms develop prior to next visit. Health risk factors discussed and addressed.     Signed by : Kelly Ordonez MD, M.D.

## 2021-06-04 LAB
ALBUMIN SERPL-MCNC: 4.3 G/DL (ref 3.5–5.2)
ALP BLD-CCNC: 81 U/L (ref 40–129)
ALT SERPL-CCNC: 20 U/L (ref 0–40)
ANION GAP SERPL CALCULATED.3IONS-SCNC: 15 MMOL/L (ref 7–16)
AST SERPL-CCNC: 29 U/L (ref 0–39)
BILIRUB SERPL-MCNC: 0.8 MG/DL (ref 0–1.2)
BUN BLDV-MCNC: 22 MG/DL (ref 6–23)
CALCIUM SERPL-MCNC: 9.5 MG/DL (ref 8.6–10.2)
CHLORIDE BLD-SCNC: 102 MMOL/L (ref 98–107)
CHOLESTEROL, TOTAL: 165 MG/DL (ref 0–199)
CO2: 23 MMOL/L (ref 22–29)
CREAT SERPL-MCNC: 1.4 MG/DL (ref 0.7–1.2)
GFR AFRICAN AMERICAN: 58
GFR NON-AFRICAN AMERICAN: 48 ML/MIN/1.73
GLUCOSE BLD-MCNC: 131 MG/DL (ref 74–99)
HDLC SERPL-MCNC: 52 MG/DL
LDL CHOLESTEROL CALCULATED: 65 MG/DL (ref 0–99)
POTASSIUM SERPL-SCNC: 4.8 MMOL/L (ref 3.5–5)
SODIUM BLD-SCNC: 140 MMOL/L (ref 132–146)
TOTAL PROTEIN: 6.9 G/DL (ref 6.4–8.3)
TRIGL SERPL-MCNC: 241 MG/DL (ref 0–149)
VLDLC SERPL CALC-MCNC: 48 MG/DL

## 2021-06-14 ENCOUNTER — OFFICE VISIT (OUTPATIENT)
Dept: FAMILY MEDICINE CLINIC | Age: 86
End: 2021-06-14
Payer: MEDICARE

## 2021-06-14 VITALS
RESPIRATION RATE: 16 BRPM | BODY MASS INDEX: 24.59 KG/M2 | TEMPERATURE: 97.8 F | WEIGHT: 153 LBS | DIASTOLIC BLOOD PRESSURE: 82 MMHG | SYSTOLIC BLOOD PRESSURE: 138 MMHG | OXYGEN SATURATION: 96 % | HEART RATE: 62 BPM | HEIGHT: 66 IN

## 2021-06-14 DIAGNOSIS — I10 ESSENTIAL HYPERTENSION: Chronic | ICD-10-CM

## 2021-06-14 DIAGNOSIS — N40.1 BENIGN NON-NODULAR PROSTATIC HYPERPLASIA WITH LOWER URINARY TRACT SYMPTOMS: ICD-10-CM

## 2021-06-14 DIAGNOSIS — K21.9 GASTROESOPHAGEAL REFLUX DISEASE WITHOUT ESOPHAGITIS: Chronic | ICD-10-CM

## 2021-06-14 DIAGNOSIS — Z00.00 ROUTINE GENERAL MEDICAL EXAMINATION AT A HEALTH CARE FACILITY: ICD-10-CM

## 2021-06-14 DIAGNOSIS — M47.812 SPONDYLOSIS OF CERVICAL REGION WITHOUT MYELOPATHY OR RADICULOPATHY: ICD-10-CM

## 2021-06-14 DIAGNOSIS — N18.31 STAGE 3A CHRONIC KIDNEY DISEASE (HCC): Chronic | ICD-10-CM

## 2021-06-14 DIAGNOSIS — I25.10 CORONARY ARTERY DISEASE INVOLVING NATIVE CORONARY ARTERY OF NATIVE HEART WITHOUT ANGINA PECTORIS: Primary | Chronic | ICD-10-CM

## 2021-06-14 DIAGNOSIS — E78.2 MIXED HYPERLIPIDEMIA: Chronic | ICD-10-CM

## 2021-06-14 DIAGNOSIS — H91.13 PRESBYCUSIS OF BOTH EARS: ICD-10-CM

## 2021-06-14 PROCEDURE — G0439 PPPS, SUBSEQ VISIT: HCPCS | Performed by: FAMILY MEDICINE

## 2021-06-14 PROCEDURE — 99212 OFFICE O/P EST SF 10 MIN: CPT | Performed by: FAMILY MEDICINE

## 2021-06-14 RX ORDER — METOPROLOL TARTRATE 100 MG/1
100 TABLET ORAL 2 TIMES DAILY
Qty: 180 TABLET | Refills: 3 | Status: SHIPPED
Start: 2021-06-14 | End: 2022-06-15 | Stop reason: SDUPTHER

## 2021-06-14 RX ORDER — ATORVASTATIN CALCIUM 40 MG/1
40 TABLET, FILM COATED ORAL DAILY
Qty: 90 TABLET | Refills: 3 | Status: SHIPPED
Start: 2021-06-14 | End: 2021-10-13 | Stop reason: SDUPTHER

## 2021-06-14 RX ORDER — ISOSORBIDE MONONITRATE 60 MG/1
60 TABLET, EXTENDED RELEASE ORAL DAILY
Qty: 90 TABLET | Refills: 3 | Status: SHIPPED
Start: 2021-06-14 | End: 2022-06-15 | Stop reason: SDUPTHER

## 2021-06-14 RX ORDER — BENAZEPRIL HYDROCHLORIDE 20 MG/1
20 TABLET ORAL 2 TIMES DAILY
Qty: 180 TABLET | Refills: 3 | Status: SHIPPED
Start: 2021-06-14 | End: 2021-10-13 | Stop reason: SDUPTHER

## 2021-06-14 RX ORDER — HYDROCHLOROTHIAZIDE 12.5 MG/1
12.5 CAPSULE, GELATIN COATED ORAL EVERY MORNING
Qty: 90 CAPSULE | Refills: 3 | Status: SHIPPED
Start: 2021-06-14 | End: 2022-06-15 | Stop reason: SDUPTHER

## 2021-06-14 ASSESSMENT — PATIENT HEALTH QUESTIONNAIRE - PHQ9
SUM OF ALL RESPONSES TO PHQ QUESTIONS 1-9: 0
1. LITTLE INTEREST OR PLEASURE IN DOING THINGS: 0
2. FEELING DOWN, DEPRESSED OR HOPELESS: 0
SUM OF ALL RESPONSES TO PHQ QUESTIONS 1-9: 0
SUM OF ALL RESPONSES TO PHQ9 QUESTIONS 1 & 2: 0
SUM OF ALL RESPONSES TO PHQ QUESTIONS 1-9: 0

## 2021-06-14 ASSESSMENT — LIFESTYLE VARIABLES: HOW OFTEN DO YOU HAVE A DRINK CONTAINING ALCOHOL: 0

## 2021-06-14 NOTE — PROGRESS NOTES
Medicare Annual Wellness Visit  Name: Jarvis Hardwick Date: 2021   MRN: <[id number]> Sex: Male   Age: 80 y.o. Ethnicity: Non-/Non    : 1935 Race: Al Asif is here for Medicare AWV    Screenings for behavioral, psychosocial and functional/safety risks, and cognitive dysfunction are all negative except as indicated below. These results, as well as other patient data from the [address] form, are documented in Flowsheets linked to this Encounter. No Known Allergies    Prior to Visit Medications    Medication Sig Taking? Authorizing Provider   metoprolol (LOPRESSOR) 100 MG tablet Take 1 tablet by mouth 2 times daily Yes Pablito Marie MD   hydroCHLOROthiazide (MICROZIDE) 12.5 MG capsule Take 1 capsule by mouth every morning Yes Pablito Marie MD   benazepril (LOTENSIN) 20 MG tablet Take 1 tablet by mouth 2 times daily Yes Pablito Marie MD   atorvastatin (LIPITOR) 40 MG tablet Take 1 tablet by mouth daily Yes Pablito Marie MD   isosorbide mononitrate (IMDUR) 60 MG extended release tablet Take 1 tablet by mouth daily Yes Pablito Marie MD   nitroGLYCERIN (NITROSTAT) 0.4 MG SL tablet Place 1 tablet under the tongue every 5 minutes as needed for Chest pain up to max of 3 total doses. If no relief after 1 dose, call 911.  Yes Pablito Marie MD   Multiple Vitamin (MULTI-VITAMIN DAILY PO) Take by mouth daily Yes Historical Provider, MD   aspirin 81 MG tablet Take 81 mg by mouth daily Yes Historical Provider, MD       Past Medical History:   Diagnosis Date    Arthritis     Fingers and knees bilaterally    CAD (coronary artery disease)     CKD (chronic kidney disease) stage 3, GFR 30-59 ml/min ([facility] [address] 2020    GERD (gastroesophageal reflux disease) 2013    Hyperlipidemia     Hypertension     Myocardial infarct, old     Renal insufficiency     Valvular heart disease        Past Surgical History:   Procedure Laterality Date   [address]  CARDIAC CATHETERIZATION  10/2006    left main 20% ostial, LAD 20% prox, first diag 40% ostial, LCX 30%prox & 30%mid, RCA no significant disease    CARDIAC CATHETERIZATION  11/20/2011    patent stent    CARDIAC CATHETERIZATION  02/05/2016    patent stents, LM 20% ostial, Cx luminal irregularities--medical management    CARDIOVASCULAR STRESS TEST  10/03/2011    nuclear    CARDIOVASCULAR STRESS TEST  01/25/2016    CORONARY ANGIOPLASTY WITH STENT PLACEMENT Left 1999    stent to RCA  @ [facility]    TRANSTHORACIC ECHOCARDIOGRAM  01/24/2016    EF 55%, 1+TR & 1+ MR       Family History   Problem Relation Age of Onset    Cancer Mother     High Blood Pressure Mother     High Cholesterol Mother     Cirrhosis Father        CareTeam (Including outside providers/suppliers regularly involved in providing care):   Patient Care Team:  Gilbert Marr MD as PCP - Iker Bennett MD as PCP - [facility] Empaneled Provider  Jacques Pitts MD as Consulting Physician (Cardiology)    Wt Readings from Last 3 Encounters:   06/14/21 153 lb (69.4 kg)   06/03/21 157 lb (71.2 kg)   12/14/20 157 lb (71.2 kg)     Vitals:    06/14/21 1457 06/14/21 1519   BP: (!) 152/84 138/82   Pulse: 62    Resp: 16    Temp: 97.8 °F (36.6 °C)    TempSrc: Temporal    SpO2: 96%    Weight: 153 lb (69.4 kg)    Height: 5' 6\" (1.676 m)      Body mass index is 24.69 kg/m². Based upon direct observation of the patient, evaluation of cognition reveals recent and remote memory intact. General Appearance: alert and oriented to person, place and time, well-developed and well-nourished, in no acute distress  Skin: warm and dry, no rash or erythema and multiple areas of sun damaged skin.   Pulmonary/Chest: clear to auscultation bilaterally- no wheezes, rales or rhonchi, normal air movement, no respiratory distress  Cardiovascular: normal rate, normal S1 and S2, no gallops, intact distal pulses and no carotid bruits  Extremities: no cyanosis and no clubbing    Patient's complete Health Risk Assessment and screening values have been reviewed and are found in Flowsheets. The following problems were reviewed today and where indicated follow up appointments were made and/or referrals ordered. Positive Risk Factor Screenings with Interventions:     Fall Risk:  Timed Up and Go Test > 12 seconds? (Complete if either Fall Risk answers are Yes): no  Fall with injury in past year?: (!) yes  Fall Risk Interventions:    · Patient declines any further evaluation/treatment for this issue        General Health and ACP:  General  In general, how would you say your health is?: Very Good  In the past 7 days, have you experienced any of the following?  New or Increased Pain, New or Increased Fatigue, Loneliness, Social Isolation, Stress or Anger?: (!) Loneliness  Do you get the social and emotional support that you need?: Yes  Do you have a Living Will?: Yes  Advance Directives     Power of [address] [place] [address] Will ACP-Advance Directive ACP-Power of     Not on File Not on File Not on File Not on File      General Health Risk Interventions:  · Loneliness: patient declines any further intervention for this issue     Hearing/Vision:  No exam data present  Hearing/Vision  Do you or your family notice any trouble with your hearing that hasn't been managed with hearing aids?: (!) Yes  Do you have difficulty driving, watching TV, or doing any of your daily activities because of your eyesight?: No  Have you had an eye exam within the past year?: (!) No  Hearing/Vision Interventions:  · Hearing concerns:  audiology referral provided  · Vision concerns:  patient encouraged to make appointment with his/her eye specialist      Personalized Preventive Plan   Current Health Maintenance Status  Immunization History   Administered Date(s) Administered    COVID-19, Moderna, PF, 100mcg/0.5mL 01/21/2021, 02/18/2021    Influenza Virus Vaccine 09/23/2016    Influenza, High Dose (Fluzone 65 yrs and older) 09/08/2018    Influenza, MDCK Quadv, IM, PF (Flucelvax 4 yrs and older) 09/27/2019    Influenza, MDCK Oscar Busing, with preserv IM (Flucelvax 4 yrs and older) 10/19/2017    Influenza, Quadv, IM, PF (6 mo and older Fluzone, Flulaval, Fluarix, and 3 yrs and older Afluria) 09/05/2020    Pneumococcal Conjugate 13-valent ([id number]) 08/17/2017    Pneumococcal Polysaccharide ([id number]) 10/08/2018    Tdap (Boostrix, Adacel) 09/08/2018    Zoster Live (Zostavax) 10/15/2016    Zoster Recombinant (Shingrix) 02/12/2019, 04/11/2019        Health Maintenance   Topic Date Due    Annual Wellness Visit (AWV)  Never done    Lipid screen  06/03/2022    Potassium monitoring  06/03/2022    Creatinine monitoring  06/03/2022    DTaP/Tdap/Td vaccine (2 - Td or Tdap) 09/08/2028    Flu vaccine  Completed    Shingles Vaccine  Completed    Pneumococcal 65+ years Vaccine  Completed    COVID-19 Vaccine  Completed    Hepatitis A vaccine  Aged Out    Hepatitis B vaccine  Aged Out    Hib vaccine  Aged Out    Meningococcal (ACWY) vaccine  Aged Out     Recommendations for [employer] Due: see orders and patient instructions/AVS.  . Recommended screening schedule for the next 5-10 years is provided to the patient in written form: see Patient Instructions/AVS.    Gal Casas was seen today for medicare awv. Diagnoses and all orders for this visit:    Coronary artery disease involving native coronary artery of native heart without angina pectoris    Essential hypertension  -     metoprolol (LOPRESSOR) 100 MG tablet; Take 1 tablet by mouth 2 times daily  -     hydroCHLOROthiazide (MICROZIDE) 12.5 MG capsule; Take 1 capsule by mouth every morning  -     benazepril (LOTENSIN) 20 MG tablet;  Take 1 tablet by mouth 2 times daily    Gastroesophageal reflux disease without esophagitis    Presbycusis of both ears    Stage 3a chronic kidney disease (HCC)    Benign non-nodular prostatic hyperplasia with lower urinary tract symptoms    Mixed hyperlipidemia    Spondylosis of cervical region without myelopathy or radiculopathy    Other orders  -     atorvastatin (LIPITOR) 40 MG tablet; Take 1 tablet by mouth daily  -     isosorbide mononitrate (IMDUR) 60 MG extended release tablet;  Take 1 tablet by mouth daily

## 2021-06-14 NOTE — PATIENT INSTRUCTIONS
Patient Education        Preventing Falls: Care Instructions  Your Care Instructions     Getting around your home safely can be a challenge if you have injuries or health problems that make it easy for you to fall. Loose rugs and furniture in walkways are among the dangers for many older people who have problems walking or who have poor eyesight. People who have conditions such as arthritis, osteoporosis, or dementia also have to be careful not to fall. You can make your home safer with a few simple measures. Follow-up care is a key part of your treatment and safety. Be sure to make and go to all appointments, and call your doctor if you are having problems. It's also a good idea to know your test results and keep a list of the medicines you take. How can you care for yourself at home? Taking care of yourself  · You may get dizzy if you do not drink enough water. To prevent dehydration, drink plenty of fluids. Choose water and other caffeine-free clear liquids. If you have kidney, heart, or liver disease and have to limit fluids, talk with your doctor before you increase the amount of fluids you drink. · Exercise regularly to improve your strength, muscle tone, and balance. Walk if you can. Swimming may be a good choice if you cannot walk easily. · Have your vision and hearing checked each year or any time you notice a change. If you have trouble seeing and hearing, you might not be able to avoid objects and could lose your balance. · Know the side effects of the medicines you take. Ask your doctor or pharmacist whether the medicines you take can affect your balance. Sleeping pills or sedatives can affect your balance. · Limit the amount of alcohol you drink. Alcohol can impair your balance and other senses. · Ask your doctor whether calluses or corns on your feet need to be removed. If you wear loose-fitting shoes because of calluses or corns, you can lose your balance and fall.   · Talk to your doctor if you have numbness in your feet. Preventing falls at home  · Remove raised doorway thresholds, throw rugs, and clutter. Repair loose carpet or raised areas in the floor. · Move furniture and electrical cords to keep them out of walking paths. · Use nonskid floor wax, and wipe up spills right away, especially on ceramic tile floors. · If you use a walker or cane, put rubber tips on it. If you use crutches, clean the bottoms of them regularly with an abrasive pad, such as steel wool. · Keep your house well lit, especially Sylwia Douglas, and outside walkways. Use night-lights in areas such as hallways and bathrooms. Add extra light switches or use remote switches (such as switches that go on or off when you clap your hands) to make it easier to turn lights on if you have to get up during the night. · Install sturdy handrails on stairways. · Move items in your cabinets so that the things you use a lot are on the lower shelves (about waist level). · Keep a cordless phone and a flashlight with new batteries by your bed. If possible, put a phone in each of the main rooms of your house, or carry a cell phone in case you fall and cannot reach a phone. Or, you can wear a device around your neck or wrist. You push a button that sends a signal for help. · Wear low-heeled shoes that fit well and give your feet good support. Use footwear with nonskid soles. Check the heels and soles of your shoes for wear. Repair or replace worn heels or soles. · Do not wear socks without shoes on wood floors. · Walk on the grass when the sidewalks are slippery. If you live in an area that gets snow and ice in the winter, sprinkle salt on slippery steps and sidewalks. Preventing falls in the bath  · Install grab bars and nonskid mats inside and outside your shower or tub and near the toilet and sinks. · Use shower chairs and bath benches. · Use a hand-held shower head that will allow you to sit while showering.   · Get into a tub or shower by putting the weaker leg in first. Get out of a tub or shower with your strong side first.  · Repair loose toilet seats and consider installing a raised toilet seat to make getting on and off the toilet easier. · Keep your bathroom door unlocked while you are in the shower. Where can you learn more? Go to [email or web address] and sign in to your [email or web address] account. Enter [phone] in the [facility] box to learn more about \"Preventing Falls: Care Instructions. \"     If you do not have an account, please click on the \"Sign Up Now\" link. Current as of: December 7, 2020               Content Version: 12.8  © 8234-0027 [employer]wise, Incorporated. Care instructions adapted under license by [facility] ([facility]). If you have questions about a medical condition or this instruction, always ask your healthcare professional. [place] [address] any warranty or liability for your use of this information.

## 2021-06-18 ENCOUNTER — OFFICE VISIT (OUTPATIENT)
Dept: CARDIOLOGY CLINIC | Age: 86
End: 2021-06-18
Payer: MEDICARE

## 2021-06-18 VITALS
DIASTOLIC BLOOD PRESSURE: 60 MMHG | SYSTOLIC BLOOD PRESSURE: 128 MMHG | BODY MASS INDEX: 24.88 KG/M2 | RESPIRATION RATE: 16 BRPM | HEART RATE: 58 BPM | WEIGHT: 154.8 LBS | HEIGHT: 66 IN

## 2021-06-18 DIAGNOSIS — E78.2 MIXED HYPERLIPIDEMIA: Chronic | ICD-10-CM

## 2021-06-18 DIAGNOSIS — I10 ESSENTIAL HYPERTENSION: Chronic | ICD-10-CM

## 2021-06-18 DIAGNOSIS — N18.31 STAGE 3A CHRONIC KIDNEY DISEASE (HCC): Chronic | ICD-10-CM

## 2021-06-18 DIAGNOSIS — I25.10 CORONARY ARTERY DISEASE INVOLVING NATIVE CORONARY ARTERY OF NATIVE HEART WITHOUT ANGINA PECTORIS: Primary | Chronic | ICD-10-CM

## 2021-06-18 PROCEDURE — 99214 OFFICE O/P EST MOD 30 MIN: CPT | Performed by: INTERNAL MEDICINE

## 2021-06-18 PROCEDURE — 93000 ELECTROCARDIOGRAM COMPLETE: CPT | Performed by: INTERNAL MEDICINE

## 2021-06-18 NOTE — PROGRESS NOTES
OUTPATIENT CARDIOLOGY FOLLOW-UP    Name: Melissa Cunha    Age: 80 y.o. Primary Care Physician: Lala Cruz MD    Date of Service: 6/18/2021    Chief Complaint: Coronary atherosclerosis, hypertension, chronic kidney disease    Interim History: Since his most recent evaluation, the patient has remained compensated from a cardiovascular standpoint and denies any interim symptoms of anginal-like chest discomfort or other ischemic equivalents nor manifestations of decompensated left ventricular systolic dysfunction or volume overload. He denies changes of his functional capabilities. At the time of evaluation he remains normotensive on his existing medical regimen with no change of his renal function in the face of his chronic kidney disease. Review of Systems: The remainder of a complete multisystem review including consitutional, central nervous, respiratory, circulatory, gastrointestinal, genitourinary, endocrinologic, hematologic, musculoskeletal and psychiatric are negative.     Past Medical History:  Past Medical History:   Diagnosis Date    Arthritis     Fingers and knees bilaterally    CAD (coronary artery disease)     CKD (chronic kidney disease) stage 3, GFR 30-59 ml/min ([facility]) 6/8/2020    GERD (gastroesophageal reflux disease) 2013    Hyperlipidemia     Hypertension     Myocardial infarct, old 1999    Renal insufficiency     Valvular heart disease        Past Surgical History:  Past Surgical History:   Procedure Laterality Date    APPENDECTOMY  1960    CARDIAC CATHETERIZATION  10/2006    left main 20% ostial, LAD 20% prox, first diag 40% ostial, LCX 30%prox & 30%mid, RCA no significant disease    CARDIAC CATHETERIZATION  11/20/2011    patent stent    CARDIAC CATHETERIZATION  02/05/2016    patent stents, LM 20% ostial, Cx luminal irregularities--medical management    CARDIOVASCULAR STRESS TEST  10/03/2011    nuclear    CARDIOVASCULAR STRESS TEST  01/25/2016    CORONARY (LOPRESSOR) 100 MG tablet Take 1 tablet by mouth 2 times daily 180 tablet 3    hydroCHLOROthiazide (MICROZIDE) 12.5 MG capsule Take 1 capsule by mouth every morning 90 capsule 3    benazepril (LOTENSIN) 20 MG tablet Take 1 tablet by mouth 2 times daily 180 tablet 3    atorvastatin (LIPITOR) 40 MG tablet Take 1 tablet by mouth daily 90 tablet 3    isosorbide mononitrate (IMDUR) 60 MG extended release tablet Take 1 tablet by mouth daily 90 tablet 3    nitroGLYCERIN (NITROSTAT) 0.4 MG SL tablet Place 1 tablet under the tongue every 5 minutes as needed for Chest pain up to max of 3 total doses. If no relief after 1 dose, call 911. 25 tablet 3    Multiple Vitamin (MULTI-VITAMIN DAILY PO) Take by mouth daily      aspirin 81 MG tablet Take 81 mg by mouth daily       No current facility-administered medications for this visit. Physical Exam:  /60 (Site: Left Upper Arm, Position: Sitting)   Pulse 58   Resp 16   Ht 5' 6\" (1.676 m)   Wt 154 lb 12.8 oz (70.2 kg)   BMI 24.99 kg/m²   Wt Readings from Last 3 Encounters:   06/18/21 154 lb 12.8 oz (70.2 kg)   06/14/21 153 lb (69.4 kg)   06/03/21 157 lb (71.2 kg)     The patient is awake, alert and in no discomfort or distress. No gross musculoskeletal deformity is present. No significant skin or nail changes are present. Gross examination of head, eyes, nose and throat are negative. Jugular venous pressure is normal and no carotid bruits are present. Normal respiratory effort is noted with no accessory muscle usage present. Lung fields are clear to ascultation. Cardiac examination is notable for a regular rate and rhythm with no palpable thrill. No gallop rhythm or cardiac murmur are identified. A benign abdominal examination is present with no masses or organomegaly. Intact pulses are present throughout all extremities and no peripheral edema is present. No focal neurologic deficits are present.     Laboratory Tests:  Lab Results   Component Value Date additional cardiovascular difficulties or concerns arise. The patient's current medication list, allergies, problem list and results of all previously ordered testing were reviewed at today's visit. Follow-up office visit in 1 year      Note: This report was completed using computerized voice recognition software. Every effort has been made to ensure accuracy, however; inadvertent computerized transcription errors may be present. Alexis Reynolds.  Charise Aase, [address] [place] [address]    An electronic copy of this follow-up progress note was forwarded to Dr. Leslie Castañeda

## 2021-08-09 ENCOUNTER — PROCEDURE VISIT (OUTPATIENT)
Dept: AUDIOLOGY | Age: 86
End: 2021-08-09
Payer: MEDICARE

## 2021-08-09 DIAGNOSIS — H93.13 TINNITUS OF BOTH EARS: ICD-10-CM

## 2021-08-09 DIAGNOSIS — H90.3 SENSORINEURAL HEARING LOSS (SNHL) OF BOTH EARS: Primary | ICD-10-CM

## 2021-08-09 PROCEDURE — 92557 COMPREHENSIVE HEARING TEST: CPT | Performed by: AUDIOLOGIST

## 2021-08-09 PROCEDURE — 92567 TYMPANOMETRY: CPT | Performed by: AUDIOLOGIST

## 2021-08-09 NOTE — PROGRESS NOTES
This patient was referred for audiometric/tympanometric testing by Brunilda Cantor MD due to bilateral hearing loss. Patient reported hearing loss, which has gone on for several years and has recently worsened. He also reported having tinnitus for many years. He denied any ear pain. He reported some dizziness, which he has discussed with his primary care doctor. Audiometry revealed a mild sloping to severe sensorineural hearing loss, bilaterally. Reliability was good. Speech reception thresholds were in good agreement with the pure tone averages, bilaterally. Speech discrimination scores were poor (54%), in the right ear and good, in the left ear. Asymmetrical speech discrimination score noted. Tympanometry revealed normal middle ear peak pressure and compliance, in the right ear and reduced compliance, in the left ear. The results were reviewed with the patient. Briefly discussed amplification options. Patient will call his insurance to see     Recommendations for follow up will be made pending physician consult. Referral to patient's usual ENT for hearing aid clearance recommended, due to asymmetrical speech discrimination score.        Valeriano Kline [facility]  [address] [facility] [id number]  Electronically signed by Valeriano Kline on 8/9/2021 at 4:14 PM

## 2021-10-13 DIAGNOSIS — I10 ESSENTIAL HYPERTENSION: Chronic | ICD-10-CM

## 2021-10-13 RX ORDER — ATORVASTATIN CALCIUM 40 MG/1
40 TABLET, FILM COATED ORAL DAILY
Qty: 90 TABLET | Refills: 3 | Status: SHIPPED
Start: 2021-10-13 | End: 2022-06-15 | Stop reason: SDUPTHER

## 2021-10-13 RX ORDER — BENAZEPRIL HYDROCHLORIDE 20 MG/1
20 TABLET ORAL 2 TIMES DAILY
Qty: 180 TABLET | Refills: 3 | Status: SHIPPED
Start: 2021-10-13 | End: 2022-06-15 | Stop reason: SDUPTHER

## 2021-11-22 RX ORDER — NITROGLYCERIN 0.4 MG/1
0.4 TABLET SUBLINGUAL EVERY 5 MIN PRN
Qty: 25 TABLET | Refills: 3 | Status: SHIPPED
Start: 2021-11-22 | End: 2022-06-15 | Stop reason: SDUPTHER

## 2021-12-10 ENCOUNTER — OFFICE VISIT (OUTPATIENT)
Dept: FAMILY MEDICINE CLINIC | Age: 86
End: 2021-12-10
Payer: MEDICARE

## 2021-12-10 VITALS
BODY MASS INDEX: 25.55 KG/M2 | HEIGHT: 66 IN | SYSTOLIC BLOOD PRESSURE: 142 MMHG | HEART RATE: 65 BPM | WEIGHT: 159 LBS | DIASTOLIC BLOOD PRESSURE: 67 MMHG | RESPIRATION RATE: 20 BRPM | OXYGEN SATURATION: 95 % | TEMPERATURE: 97.4 F

## 2021-12-10 DIAGNOSIS — N18.31 STAGE 3A CHRONIC KIDNEY DISEASE (HCC): ICD-10-CM

## 2021-12-10 DIAGNOSIS — Z12.11 COLON CANCER SCREENING: ICD-10-CM

## 2021-12-10 DIAGNOSIS — I10 ESSENTIAL HYPERTENSION: Primary | ICD-10-CM

## 2021-12-10 DIAGNOSIS — E78.2 MIXED HYPERLIPIDEMIA: ICD-10-CM

## 2021-12-10 DIAGNOSIS — I25.10 CORONARY ARTERY DISEASE INVOLVING NATIVE CORONARY ARTERY OF NATIVE HEART WITHOUT ANGINA PECTORIS: ICD-10-CM

## 2021-12-10 DIAGNOSIS — I10 ESSENTIAL HYPERTENSION: ICD-10-CM

## 2021-12-10 LAB
ANION GAP SERPL CALCULATED.3IONS-SCNC: 13 MMOL/L (ref 7–16)
BUN BLDV-MCNC: 36 MG/DL (ref 6–23)
CALCIUM SERPL-MCNC: 9.7 MG/DL (ref 8.6–10.2)
CHLORIDE BLD-SCNC: 100 MMOL/L (ref 98–107)
CO2: 25 MMOL/L (ref 22–29)
CREAT SERPL-MCNC: 1.7 MG/DL (ref 0.7–1.2)
GFR AFRICAN AMERICAN: 46
GFR NON-AFRICAN AMERICAN: 38 ML/MIN/1.73
GLUCOSE BLD-MCNC: 107 MG/DL (ref 74–99)
POTASSIUM SERPL-SCNC: 5 MMOL/L (ref 3.5–5)
SODIUM BLD-SCNC: 138 MMOL/L (ref 132–146)

## 2021-12-10 PROCEDURE — 99212 OFFICE O/P EST SF 10 MIN: CPT | Performed by: FAMILY MEDICINE

## 2021-12-10 PROCEDURE — 36415 COLL VENOUS BLD VENIPUNCTURE: CPT | Performed by: FAMILY MEDICINE

## 2021-12-10 PROCEDURE — 99213 OFFICE O/P EST LOW 20 MIN: CPT | Performed by: FAMILY MEDICINE

## 2021-12-10 ASSESSMENT — ENCOUNTER SYMPTOMS
ABDOMINAL PAIN: 0
SHORTNESS OF BREATH: 0
WHEEZING: 0
BLOOD IN STOOL: 0
COUGH: 0
NAUSEA: 0

## 2021-12-10 NOTE — PROGRESS NOTES
[address] [facility]  FAMILY MEDICINE RESIDENCY PROGRAM  DATE OF VISIT : 12/10/2021    Patient : Raghavendra Farias   Age : 80 y.o.  : 1935   MRN : <[id number]>   ______________________________________________________________________    Chief Complaint :   Chief Complaint   Patient presents with    6 Month Follow-Up       HPI : Raghavendra Farias is 80 y.o. male who presented to the clinic today for above chief complaint. Hypertension:  Current medications include benazepril 20 mg twice daily, metoprolol 100 mg twice daily, hydrochlorothiazide 12.5 mg daily. Patient reports compliance with medications without side effect. Blood pressure mildly elevated on evaluation today. Did provide home log, a.m. readings all normotensive, some mild elevation in evening readings as high as 908 systolic. Patient denies any chest pain, shortness of breath, headache, dizziness. Coronary artery disease:  Under the care of cardiology, Dr. Mathieu Rosen, last saw in 2021, due to follow-up in 2022. Patient denies any current cardiac symptoms including chest pain, shortness of breath, palpitations. Current medications include Imdur 60 mg daily, aspirin 81, metoprolol 100 mg twice daily. Chronic kidney disease:  Last creatinine 1.4, not under the care of nephrology at this time. Avoids NSAIDs. Patient with questions regarding colon cancer screening. Patient reports last colonoscopy in the late s. Patient reports bowels are moving regularly without blood in his stool, no weight loss. Patient advised of options for colon cancer screening including Cologuard versus traditional colonoscopy, agreeable to Cologuard at this time.       Past Medical History :  Past Medical History:   Diagnosis Date    Arthritis     Fingers and knees bilaterally    CAD (coronary artery disease)     CKD (chronic kidney disease) stage 3, GFR 30-59 ml/min ([facility]) 2020    GERD (gastroesophageal reflux disease)     Hyperlipidemia     Hypertension     Myocardial infarct, old 1999    Renal insufficiency     Valvular heart disease      Past Surgical History:   Procedure Laterality Date    APPENDECTOMY  1960    CARDIAC CATHETERIZATION  10/2006    left main 20% ostial, LAD 20% prox, first diag 40% ostial, LCX 30%prox & 30%mid, RCA no significant disease    CARDIAC CATHETERIZATION  11/20/2011    patent stent    CARDIAC CATHETERIZATION  02/05/2016    patent stents, LM 20% ostial, Cx luminal irregularities--medical management    CARDIOVASCULAR STRESS TEST  10/03/2011    nuclear    CARDIOVASCULAR STRESS TEST  01/25/2016    CORONARY ANGIOPLASTY WITH STENT PLACEMENT Left 1999    stent to RCA  @ [facility]    TRANSTHORACIC ECHOCARDIOGRAM  01/24/2016    EF 55%, 1+TR & 1+ MR         Review of Systems :    ROS - Per HPI   Review of Systems   Constitutional: Negative for chills, fever and unexpected weight change. Respiratory: Negative for cough, shortness of breath and wheezing. Cardiovascular: Negative for chest pain, palpitations and leg swelling. Gastrointestinal: Negative for abdominal pain, blood in stool and nausea. Neurological: Negative for dizziness, syncope, light-headedness and headaches. Psychiatric/Behavioral: Negative for behavioral problems. The patient is not nervous/anxious.       ______________________________________________________________________    Physical Exam :    Wt Readings from Last 3 Encounters:   12/10/21 159 lb (72.1 kg)   06/18/21 154 lb 12.8 oz (70.2 kg)   06/14/21 153 lb (69.4 kg)       BMI Readings from Last 3 Encounters:   12/10/21 25.66 kg/m²   06/18/21 24.99 kg/m²   06/14/21 24.69 kg/m²   ]      Vitals: BP (!) 142/67 (Site: Left Upper Arm, Position: Sitting, Cuff Size: Medium Adult)   Pulse 65   Temp 97.4 °F (36.3 °C) (Temporal)   Resp 20   Ht 5' 6\" (1.676 m)   Wt 159 lb (72.1 kg)   SpO2 95%   BMI 25.66 kg/m²     Physical Exam  Constitutional:       General: He is not in acute distress. Appearance: He is well-developed. HENT:      Head: Normocephalic and atraumatic. Eyes:      Conjunctiva/sclera: Conjunctivae normal.      Pupils: Pupils are equal, round, and reactive to light. Neck:      Thyroid: No thyromegaly. Trachea: No tracheal deviation. Cardiovascular:      Rate and Rhythm: Normal rate and regular rhythm. Heart sounds: Normal heart sounds. No murmur heard. Pulmonary:      Effort: Pulmonary effort is normal. No respiratory distress. Breath sounds: Normal breath sounds. No wheezing or rales. Abdominal:      General: Bowel sounds are normal. There is no distension. Palpations: Abdomen is soft. Tenderness: There is no abdominal tenderness. Musculoskeletal:         General: Normal range of motion. Cervical back: Normal range of motion and neck supple. Lymphadenopathy:      Cervical: No cervical adenopathy. Skin:     General: Skin is warm and dry. Capillary Refill: Capillary refill takes less than 2 seconds. Findings: No rash. Neurological:      Mental Status: He is alert and oriented to person, place, and time. Cranial Nerves: No cranial nerve deficit. Deep Tendon Reflexes: Reflexes normal.   Psychiatric:         Behavior: Behavior normal.         ______________________________________________________________________    Assessment & Plan :     Diagnosis Orders   1. Essential hypertension  BASIC METABOLIC PANEL   2. Coronary artery disease involving native coronary artery of native heart without angina pectoris  BASIC METABOLIC PANEL   3. Stage 3a chronic kidney disease (HCC)  BASIC METABOLIC PANEL   4. Mixed hyperlipidemia     5. Colon cancer screening  Cologuard (For External Results Only)       Hypertension: Mildly elevated today, blood pressure log reviewed, no change in therapy at this time. Patient to rest before checking blood pressures in the evening. Advise regarding limitation of salt in diet. Patient to call with any persistently elevated readings. Coronary artery disease: Under the care of cardiology, stable at this time. Follow with cardiologist June 2022 or sooner as needed. CKD: Monitor creatinine with BMP today, continue to avoid NSAIDs. Colon cancer screening: Options reviewed with patient as discussed above, Cologuard ordered. Health maintenance: Did receive Covid booster at outside facility      Follow up:  Return in about 6 months (around 6/10/2022) for chronic disease follow up.       Seferino Giles MD PGY-3    Discussed with: Dr. Trinh Reid

## 2021-12-10 NOTE — PROGRESS NOTES
S: 80 y.o. male with HTN, CAD followed by cardiology Dr. Kenroy Kohler, for 6 month follow up. No symptoms or concerns. Taking medications. Feeling well. No CP or SOB. Checks BP at home. Had COVID booster. Works 4 days per week. Asks for colorectal screening. Discussed Cologuard. CKD, Cr 1.4. Avoids NSAIDs. O: VS: BP (!) 142/67 (Site: Left Upper Arm, Position: Sitting, Cuff Size: Medium Adult)   Pulse 65   Temp 97.4 °F (36.3 °C) (Temporal)   Resp 20   Ht 5' 6\" (1.676 m)   Wt 159 lb (72.1 kg)   SpO2 95%   BMI 25.66 kg/m²    General: NAD, appropriate affect and grooming   CV:  RRR, no gallops, rubs, or murmurs   Resp: CTAB   Abd:  Soft, nontender   Ext:  No edema  Impression: HTN, CKD, CAD, HLD  Plan: BMP. Same medications. Follow with cardiology. Cologuard. RTO 6 months or sooner prn. Advised on best technique for BP check, continue to follow at home, and call if readings out of range. Attending Physician Statement  I have discussed the case, including pertinent history and exam findings with the resident. I agree with the documented assessment and plan.

## 2021-12-14 DIAGNOSIS — N18.31 STAGE 3A CHRONIC KIDNEY DISEASE (HCC): Primary | ICD-10-CM

## 2021-12-29 ENCOUNTER — TELEPHONE (OUTPATIENT)
Dept: FAMILY MEDICINE CLINIC | Age: 86
End: 2021-12-29

## 2021-12-29 NOTE — TELEPHONE ENCOUNTER
----- Message from [facility] sent at 12/29/2021 10:40 AM EST -----  Subject: Message to Provider    QUESTIONS  Information for Provider? patient still hasnt received colorguard in mail   ---------------------------------------------------------------------------  --------------  [address]  What is the best way for the office to contact you? OK to leave message on   voicemail  Preferred Call Back Phone Number? [phone]  ---------------------------------------------------------------------------  --------------  SCRIPT ANSWERS  Relationship to Patient?  Self

## 2022-01-07 ENCOUNTER — TELEPHONE (OUTPATIENT)
Dept: FAMILY MEDICINE CLINIC | Age: 87
End: 2022-01-07

## 2022-01-07 DIAGNOSIS — Z12.11 SCREEN FOR COLON CANCER: Primary | ICD-10-CM

## 2022-01-07 NOTE — TELEPHONE ENCOUNTER
----- Message from Huber Puga sent at 1/7/2022 11:03 AM EST -----  Subject: Referral Request    QUESTIONS   Reason for referral request? PT is requesting referral request for   colonoscopy. PT has been seeing Dr. Bacilio Leal. Has the physician seen you for this condition before? No   Preferred Specialist (if applicable)? Do you already have an appointment scheduled? No  Additional Information for Provider?   ---------------------------------------------------------------------------  --------------  CALL BACK INFO  What is the best way for the office to contact you? OK to leave message on   voicemail  Preferred Call Back Phone Number?  [phone]

## 2022-01-07 NOTE — TELEPHONE ENCOUNTER
----- Message from Carla Jeffery sent at 1/7/2022 11:03 AM EST -----  Subject: Referral Request    QUESTIONS   Reason for referral request? PT is requesting referral request for   colonoscopy. PT has been seeing Dr. Shant Ovalle. Has the physician seen you for this condition before? No   Preferred Specialist (if applicable)? Do you already have an appointment scheduled? No  Additional Information for Provider?   ---------------------------------------------------------------------------  --------------  CALL BACK INFO  What is the best way for the office to contact you? OK to leave message on   voicemail  Preferred Call Back Phone Number?  [phone]

## 2022-01-13 ENCOUNTER — TELEPHONE (OUTPATIENT)
Dept: SURGERY | Age: 87
End: 2022-01-13

## 2022-01-13 NOTE — TELEPHONE ENCOUNTER
MA made first attempt to contact patient to schedule appointment based on referral by Dr Alvarado Ragland for colonoscopy consult. Patient did not answer so MA left  requesting return call to schedule w/ first available provider.     Electronically signed by Ella Torres on 1/13/22 at 2:53 PM EST

## 2022-02-10 ENCOUNTER — OFFICE VISIT (OUTPATIENT)
Dept: SURGERY | Age: 87
End: 2022-02-10
Payer: MEDICARE

## 2022-02-10 ENCOUNTER — PREP FOR PROCEDURE (OUTPATIENT)
Dept: SURGERY | Age: 87
End: 2022-02-10

## 2022-02-10 VITALS
SYSTOLIC BLOOD PRESSURE: 174 MMHG | WEIGHT: 160 LBS | HEIGHT: 66 IN | DIASTOLIC BLOOD PRESSURE: 84 MMHG | OXYGEN SATURATION: 95 % | BODY MASS INDEX: 25.71 KG/M2 | TEMPERATURE: 97.9 F | RESPIRATION RATE: 16 BRPM | HEART RATE: 75 BPM

## 2022-02-10 DIAGNOSIS — I10 ESSENTIAL HYPERTENSION: Chronic | ICD-10-CM

## 2022-02-10 DIAGNOSIS — N40.1 BENIGN NON-NODULAR PROSTATIC HYPERPLASIA WITH LOWER URINARY TRACT SYMPTOMS: ICD-10-CM

## 2022-02-10 DIAGNOSIS — Z86.010 HISTORY OF COLON POLYPS: ICD-10-CM

## 2022-02-10 DIAGNOSIS — I25.10 CORONARY ARTERY DISEASE INVOLVING NATIVE CORONARY ARTERY OF NATIVE HEART WITHOUT ANGINA PECTORIS: Chronic | ICD-10-CM

## 2022-02-10 DIAGNOSIS — K62.5 RECTAL BLEEDING: Primary | ICD-10-CM

## 2022-02-10 PROCEDURE — 99202 OFFICE O/P NEW SF 15 MIN: CPT | Performed by: SURGERY

## 2022-02-10 PROCEDURE — 99212 OFFICE O/P EST SF 10 MIN: CPT | Performed by: SURGERY

## 2022-02-10 PROCEDURE — 99204 OFFICE O/P NEW MOD 45 MIN: CPT | Performed by: SURGERY

## 2022-02-10 RX ORDER — SODIUM CHLORIDE 0.9 % (FLUSH) 0.9 %
10 SYRINGE (ML) INJECTION PRN
Status: CANCELLED | OUTPATIENT
Start: 2022-02-10

## 2022-02-10 RX ORDER — SODIUM CHLORIDE 0.9 % (FLUSH) 0.9 %
10 SYRINGE (ML) INJECTION EVERY 12 HOURS SCHEDULED
Status: CANCELLED | OUTPATIENT
Start: 2022-02-10

## 2022-02-10 RX ORDER — SODIUM CHLORIDE, SODIUM LACTATE, POTASSIUM CHLORIDE, CALCIUM CHLORIDE 600; 310; 30; 20 MG/100ML; MG/100ML; MG/100ML; MG/100ML
INJECTION, SOLUTION INTRAVENOUS CONTINUOUS
Status: CANCELLED | OUTPATIENT
Start: 2022-02-10

## 2022-02-10 RX ORDER — BISACODYL 5 MG
TABLET, DELAYED RELEASE (ENTERIC COATED) ORAL
Qty: 8 TABLET | Refills: 0 | Status: ON HOLD
Start: 2022-02-10 | End: 2022-03-04 | Stop reason: HOSPADM

## 2022-02-10 RX ORDER — SODIUM CHLORIDE 9 MG/ML
25 INJECTION, SOLUTION INTRAVENOUS PRN
Status: CANCELLED | OUTPATIENT
Start: 2022-02-10

## 2022-02-10 NOTE — H&P (VIEW-ONLY)
History and Physical    Patient's Name/Date of Birth: Loreto Valentin /1935, (80 y.o.), male    Date: February 10, 2022     Assessment/Plan:  1. Intermittent Rectal Bleeding and History of Multiple Previous Colon Polyps - patient to sign release to get old colonoscopy records from [facility].  Normally would not recommend colonoscopy for colorectal screening, or any other screening test, after age 70-71 but patient does have symptoms (rectal bleeding) and history of colon polyps (although do not know what type of polyps). I recommended diagnostic colonoscopy with possible biopsy or polypectomy and explained the risk, benefits, expected outcome, and alternatives to the procedure. Risks included but are not limited to bleeding, infection, respiratory distress, hypotension, and perforation of the colon. The patient understands and is in agreement. 2. Enlarged prostate - most likely secondary to BPH. I did not feel any definite prostate nodules. Recommend patient be referred to urology as he is symptomatic. 3. Coronary artery disease, history of MI, status post coronary artery stents, first-degree AV block - patient seems to be stable from cardiac standpoint. However with his age and his history, he is at increased risk of medical complications with IV consultation. Patient understands this and is willing to accept this risk. 4. Essential hypertension - BP markedly elevated on today's examination. Patient will need to see his PCP to get this in better control prior to colonoscopy. 5. Hyperlipidemia  6. Chronic kidney disease - patient's last BUN and creatinine on 12/10/2021 was 36 and 1.7 respectively. 7. GERD  8. Spondylosis of cervical spine    Addendum:  My medical assistant obtained colonoscopy records from [facility] from 2/12/2016. Patient had colon polyps removed from the ascending colon and rectum.   Ascending colon polyp was a tubular adenoma while the rectal polyp was a hyperplastic polyp. Patient was also noted to have diverticulosis. Electronically signed by Racheal Almodovar MD on 2/17/22 at 3:19 PM EST    Chief Complaint   Patient presents with    Colonoscopy     pt states that his last colonoscopy was 10-15 years ago, he states that he did have polyps removed. pt denies any family history of colon cancer. pt denies any current issues such as rectal bleeding, abdominal pain, unintentional weight loss or changes in bowel habits. HPI:   Patient referred for colonoscopy. Patient had 4 prior colonoscopy with last one in approximately 2010 and findings were removed 1 or more polyps with each colonoscopy but not sure what kind of polyps. Patient had  no constipation or diarrhea. The patient denied any abdominal pain. The patient had intermittent rectal bleeding with bright red blood just when he wipes that he attributes to \"internal hemorrhoids\". He denied any pain related to the internal hemorrhoid but had occasional itching. Last time was about 1 month ago and it occurs about 3 to 4 times a year. This has been going for about 10 years and started after his last colonoscopy. The patient denied nausea, vomiting, heartburn, or indigestion. The patient did not have a family history of colon polyps. The patient did not have a family history of colon cancer. Nocturia 2-3 times a night and during the day urinates about every 1-2 hours. He feels like he empties out. Hesitancy with urination and poor stream.  These symptoms have been stable and not progressively worsening.     Past Medical History:   Diagnosis Date    Arthritis     Fingers and knees bilaterally    CAD (coronary artery disease)     CKD (chronic kidney disease) stage 3, GFR 30-59 ml/min ([address] 6/8/2020    GERD (gastroesophageal reflux disease) 2013    Hyperlipidemia     Hypertension     Myocardial infarct, old 1999    Renal insufficiency     Valvular heart disease        Past Surgical History:   Procedure Laterality Date    APPENDECTOMY  1960    CARDIAC CATHETERIZATION  10/2006    left main 20% ostial, LAD 20% prox, first diag 40% ostial, LCX 30%prox & 30%mid, RCA no significant disease    CARDIAC CATHETERIZATION  11/20/2011    patent stent    CARDIAC CATHETERIZATION  02/05/2016    patent stents, LM 20% ostial, Cx luminal irregularities--medical management    CARDIOVASCULAR STRESS TEST  10/03/2011    nuclear    CARDIOVASCULAR STRESS TEST  01/25/2016    CORONARY ANGIOPLASTY WITH STENT PLACEMENT Left 1999    stent to RCA  @ [facility]    TRANSTHORACIC ECHOCARDIOGRAM  01/24/2016    EF 55%, 1+TR & 1+ MR       Current Outpatient Medications   Medication Sig Dispense Refill    nitroGLYCERIN (NITROSTAT) 0.4 MG SL tablet Place 1 tablet under the tongue every 5 minutes as needed for Chest pain up to max of 3 total doses. If no relief after 1 dose, call 911. 25 tablet 3    benazepril (LOTENSIN) 20 MG tablet Take 1 tablet by mouth 2 times daily 180 tablet 3    atorvastatin (LIPITOR) 40 MG tablet Take 1 tablet by mouth daily 90 tablet 3    metoprolol (LOPRESSOR) 100 MG tablet Take 1 tablet by mouth 2 times daily 180 tablet 3    hydroCHLOROthiazide (MICROZIDE) 12.5 MG capsule Take 1 capsule by mouth every morning 90 capsule 3    isosorbide mononitrate (IMDUR) 60 MG extended release tablet Take 1 tablet by mouth daily 90 tablet 3    Multiple Vitamin (MULTI-VITAMIN DAILY PO) Take by mouth daily      aspirin 81 MG tablet Take 81 mg by mouth daily       No current facility-administered medications for this visit. No Known Allergies    Review of Systems  Non-contributory    Physical Exam:  Vitals:    02/10/22 1326   BP: (!) 174/84   Site: Right Upper Arm   Position: Sitting   Cuff Size: Large Adult   Pulse: 75   Resp: 16   Temp: 97.9 °F (36.6 °C)   TempSrc: Infrared   SpO2: 95%   Weight: 160 lb (72.6 kg)   Height: 5' 6\" (1.676 m)       Body mass index is 25.82 kg/m².     Physical Exam  Constitutional:       General: He is not in acute distress. Appearance: He is well-developed. He is not diaphoretic. HENT:      Head: Normocephalic and atraumatic. Eyes:      General:         Right eye: No discharge. Left eye: No discharge. Cardiovascular:      Rate and Rhythm: Normal rate and regular rhythm. Heart sounds: Normal heart sounds. No murmur heard. No friction rub. No gallop. Pulmonary:      Effort: Pulmonary effort is normal. No respiratory distress. Breath sounds: Normal breath sounds. No wheezing or rales. Chest:      Chest wall: No tenderness. Abdominal:      General: Bowel sounds are normal. There is no distension. Palpations: Abdomen is soft. Abdomen is not rigid. There is no mass. Tenderness: There is no abdominal tenderness. There is no guarding or rebound. Hernia: There is no hernia in the ventral area, left inguinal area or right inguinal area. Comments: Surgical scar RLQ from appendectomy   Genitourinary:     Penis: Normal.       Testes:         Right: Mass, tenderness or swelling not present. Left: Mass, tenderness or swelling not present. Prostate: Enlarged (right lobe markedly enlarged and firm but no nodules, left lobe hypotrophic). Not tender. Rectum: Guaiac result negative (Scant amount of hemoccult negative stool). No mass, tenderness, anal fissure, external hemorrhoid or internal hemorrhoid. Normal anal tone. Musculoskeletal:         General: No deformity. Normal range of motion. Cervical back: Normal range of motion. Skin:     General: Skin is warm and dry. Coloration: Skin is not pale. Findings: No erythema or rash. Neurological:      Mental Status: He is alert and oriented to person, place, and time.    Psychiatric:         Behavior: Behavior normal.         Judgment: Judgment normal.     Electronically signed by Magdi Mock MD on 2/10/22 at 1:52 PM EST

## 2022-02-10 NOTE — PROGRESS NOTES
Scheduled pt for Colonoscopy on 2/16/22 at 8:30 am. Pt needs to arrive at [address] [place] [address] at 7:30 am. Mariela Mccurdy pt directions and instruction sheet in office. Pt accepted date/time and verbalized understanding.     Electronically signed by Regina Resendez on 2/10/22 at 2:35 PM EST

## 2022-02-10 NOTE — PATIENT INSTRUCTIONS
Dr. Brooks Marking recommended colonoscopy with possible biopsy or polypectomy and he explained the risk, benefits, expected outcome, and alternatives to the procedure. Risks included but are not limited to bleeding, infection, respiratory distress, hypotension, and perforation of the colon. You understood and were in agreement. You will need to have someone bring you to the hospital and take you home because you will not be able to drive or work the rest of that day. Also, you need to have someone stay with you the rest of the day to make sure you do not develop any complications. [facility] General Surgery  MAGNESIUM CITRATE/DULCOLAX TABLETS  COLON PREP FOR COLONOSCOPY OR COLON SURGERY    It is very important that you follow all of the instructions listed on this sheet carefully (they may be slightly different than the directions on the product that you purchase at the pharmacy) to ensure that your colon is adequately cleaned out or your risk of complications could be increased. 2 Days or More Before Endoscopy:   Obtain three 10-ounce bottle of Magnesium Citrate and 1 bottle of Dulcolax tablets from the pharmacy.  Do not eat corn, tomatoes, peas or watermelon 5 days before procedure.  If you are on INSULIN or OTHER DIABETIC MEDICATIONS, then check with your primary care physician as to how to adjust your medication while on clear liquid diet and when nothing by mouth. 1 Day Before the Endoscopy:   No solid food  only clear liquids (soup, jello, or juice that you can see through with no solid food) for breakfast, lunch and supper. DO NOT drink or eat anything that is red as it will turn the inside of the colon red and look like blood.  Have at least 8 oz or more of clear liquids for breakfast (7 am to 8 am) and lunch (11:30 am to 12:30 pm).  12 Noon Drink a 10 oz bottle of Magnesium Citrate and 4 Dulcolax tablets followed immediately by at least 8 oz of clear liquids.    1:00 pm Drink at least 8 oz of clear liquids.  2:00 pm Take 4 Dulcolax tablets and drink at least 8 oz of clear liquids.  3:00 pm Drink at least 8 oz of clear liquids.  4:00 pm Drink a 10 oz bottle of Magnesium Citrate followed immediately by at least 8 oz of clear liquids.  5:00 pm Drink at least 8 oz of clear liquids.  Can continue to take liquids until 12 midnight then nothing to eat or drink except as instructed below    Day of Endoscopy:   4 hours prior to scheduled time for colonoscopy, drink a 10 oz bottle of Magnesium Citrate followed immediately by at least 8 oz of clear liquids. Then nothing to drink after that.  If any blood pressure medications or heart medications are due in the morning, you should take them with a sip of water. Patient Information and Instructions for Colonoscopy         Definition of Colonoscopy   A colonoscopy is the visual exam of the rectum and colon (large intestine). The exam is done with a tool called a colonoscope. The colonoscope is a flexible tube with a tiny camera on the end. This instrument allows the doctor to view the inside of your rectum and colon. Sigmoidoscopy is a shorter scope that views only the last one third of the colon. Reasons for Colonoscopy   It is used to examine, diagnose, and treat problems in your large intestine. The procedure is most often done for the following reasons: To determine the cause of abdominal pain, rectal bleeding, or a change in bowel habits   To detect and treat colon cancer or colon polyps   To obtain tissue samples for testing   To stop intestinal bleeding   Monitor response to treatment if you have inflammatory bowel disease     Possible Complications   Complications are rare, but no procedure is completely free of risk.  If you are planning to have a colonoscopy, your doctor will review a list of possible complications, which may include:   Bleeding   Reaction to the sedation causing drop in your blood pressure or problems breathing  Perforation or puncture of the bowel     Factors that may increase the risk of complications include:   Pre-existing heart or kidney condition   Treatment with certain medicines, including aspirin and other drugs with anticoagulant or blood-thinning properties   Prior abdominal surgery or radiation treatments   Active colitis , diverticulitis , or other acute bowel disease   Previous treatment with radiation therapy     Be sure to discuss these risks with your doctor before the procedure. What to Expect   Prior to Procedure   Your doctor will likely do the following:   Physical exam   Health history   Review of medicines   Test your stool for hidden blood (called \"occult blood\")     Your colon must be completely clean before the procedure. Any stool left in the intestine will block the view. This preparation may start several days before the procedure. Follow your doctor's instructions. Leading up to your procedure:   Talk to your doctor about your medicines. You may be asked to stop taking some medicines up to one week before the procedure, like:   Anti-inflammatory drugs (e.g., aspirin )   Blood thinners like clopidogrel (Plavix) or warfarin (Coumadin)   Iron supplements or vitamins containing iron   The day or days before your procedure, go on a clear liquid diet (clear broth, clear juice, clear jello) with no red coloring  Do not eat or drink anything after midnight. Wear comfortable clothing. If you have diabetes, ask your doctor if you need to adjust your diabetes medicine on the day prior to your procedure and the day of your procedure. Arrange for a ride home after the procedure. Anesthesia   You will receive intravenous sedation medicine for the procedure so you will not feel anything during the procedure.      Description of the Procedure   You will lie on your left side with knees bent and drawn up toward your chest. The colonoscope will be slowly inserted through the rectum and into the bowel. The colonoscope will inject air into the colon. A small attached video camera will allow the doctor to view the colon's lining on a screen. The doctor will continue guiding the tool through the bowel and assess the lining. A tissue sample or polyps may be removed during the procedure. How Long Will It Take? Usually it takes about 30 to 45 minutes     Will It Hurt? Most people do not feel anything during the procedure and will not remember the procedure. After the procedure, gas pains and cramping are common. These pains should go away with the passing of gas. Post-procedure Care   If any tissue was removed: It will be sent to a lab to be examined. It may take 1-2 weeks for results. The doctor will usually give an initial report after the scope is removed. Other tests may be recommended. A small amount of bleeding may occur during the first few days after the procedure. When you return home after the procedure, be sure to follow your doctor's instructions, which may include:   Resume medicines as instructed by your doctor. Resume normal diet, unless directed otherwise by your doctor. The sedative will make you drowsy. Avoid driving, operating machinery, or making important decisions for the rest of the day. Rest for the remainder of the day. After arriving home, contact your doctor if any of the following occurs:   Bleeding from your rectum, notify your doctor if you pass a teaspoonful of blood or more. Black, tarry stools   Severe abdominal pain   Hard, swollen abdomen   Signs of infection, including fever or chills   Inability to pass gas or stool   Coughing, shortness of breath, chest pain, severe nausea or vomiting     In case of an emergency, CALL 911 .

## 2022-02-10 NOTE — Clinical Note
Contact patient and let him know that his blood pressure was markedly elevated and that he needs to see his PCP prior to colonoscopy to make sure his BP is in good control and that his medical conditions are optimized. Thanks!     Electronically signed by Dianne Dunn MD on 2/10/22 at 5:31 PM EST

## 2022-02-10 NOTE — PROGRESS NOTES
History and Physical    Patient's Name/Date of Birth: Chandrakant Tavares /1935, (80 y.o.), male    Date: February 10, 2022     Assessment/Plan:  1. Intermittent Rectal Bleeding and History of Multiple Previous Colon Polyps - patient to sign release to get old colonoscopy records from [facility].  Normally would not recommend colonoscopy for colorectal screening, or any other screening test, after age 70-71 but patient does have symptoms (rectal bleeding) and history of colon polyps (although do not know what type of polyps). I recommended diagnostic colonoscopy with possible biopsy or polypectomy and explained the risk, benefits, expected outcome, and alternatives to the procedure. Risks included but are not limited to bleeding, infection, respiratory distress, hypotension, and perforation of the colon. The patient understands and is in agreement. 2. Enlarged prostate - most likely secondary to BPH. I did not feel any definite prostate nodules. Recommend patient be referred to urology as he is symptomatic. 3. Coronary artery disease, history of MI, status post coronary artery stents, first-degree AV block - patient seems to be stable from cardiac standpoint. However with his age and his history, he is at increased risk of medical complications with IV consultation. Patient understands this and is willing to accept this risk. 4. Essential hypertension - BP markedly elevated on today's examination. Patient will need to see his PCP to get this in better control prior to colonoscopy. 5. Hyperlipidemia  6. Chronic kidney disease - patient's last BUN and creatinine on 12/10/2021 was 36 and 1.7 respectively. 7. GERD  8. Spondylosis of cervical spine    Chief Complaint   Patient presents with    Colonoscopy     pt states that his last colonoscopy was 10-15 years ago, he states that he did have polyps removed. pt denies any family history of colon cancer.  pt denies any current issues such as rectal bleeding, abdominal pain, unintentional weight loss or changes in bowel habits. HPI:   Patient referred for colonoscopy. Patient had 4 prior colonoscopy with last one in approximately 2010 and findings were removed 1 or more polyps with each colonoscopy but not sure what kind of polyps. Patient had  no constipation or diarrhea. The patient denied any abdominal pain. The patient had intermittent rectal bleeding with bright red blood just when he wipes that he attributes to \"internal hemorrhoids\". He denied any pain related to the internal hemorrhoid but had occasional itching. Last time was about 1 month ago and it occurs about 3 to 4 times a year. This has been going for about 10 years and started after his last colonoscopy. The patient denied nausea, vomiting, heartburn, or indigestion. The patient did not have a family history of colon polyps. The patient did not have a family history of colon cancer. Nocturia 2-3 times a night and during the day urinates about every 1-2 hours. He feels like he empties out. Hesitancy with urination and poor stream.  These symptoms have been stable and not progressively worsening.     Past Medical History:   Diagnosis Date    Arthritis     Fingers and knees bilaterally    CAD (coronary artery disease)     CKD (chronic kidney disease) stage 3, GFR 30-59 ml/min [facility] 6/8/2020    GERD (gastroesophageal reflux disease) 2013    Hyperlipidemia     Hypertension     Myocardial infarct, old 1999    Renal insufficiency     Valvular heart disease        Past Surgical History:   Procedure Laterality Date    APPENDECTOMY  1960    CARDIAC CATHETERIZATION  10/2006    left main 20% ostial, LAD 20% prox, first diag 40% ostial, LCX 30%prox & 30%mid, RCA no significant disease    CARDIAC CATHETERIZATION  11/20/2011    patent stent    CARDIAC CATHETERIZATION  02/05/2016    patent stents, LM 20% ostial, Cx luminal irregularities--medical management    CARDIOVASCULAR STRESS TEST  10/03/2011    nuclear    CARDIOVASCULAR STRESS TEST  01/25/2016    CORONARY ANGIOPLASTY WITH STENT PLACEMENT Left 1999    stent to RCA  @ [facility]    TRANSTHORACIC ECHOCARDIOGRAM  01/24/2016    EF 55%, 1+TR & 1+ MR       Current Outpatient Medications   Medication Sig Dispense Refill    nitroGLYCERIN (NITROSTAT) 0.4 MG SL tablet Place 1 tablet under the tongue every 5 minutes as needed for Chest pain up to max of 3 total doses. If no relief after 1 dose, call 911. 25 tablet 3    benazepril (LOTENSIN) 20 MG tablet Take 1 tablet by mouth 2 times daily 180 tablet 3    atorvastatin (LIPITOR) 40 MG tablet Take 1 tablet by mouth daily 90 tablet 3    metoprolol (LOPRESSOR) 100 MG tablet Take 1 tablet by mouth 2 times daily 180 tablet 3    hydroCHLOROthiazide (MICROZIDE) 12.5 MG capsule Take 1 capsule by mouth every morning 90 capsule 3    isosorbide mononitrate (IMDUR) 60 MG extended release tablet Take 1 tablet by mouth daily 90 tablet 3    Multiple Vitamin (MULTI-VITAMIN DAILY PO) Take by mouth daily      aspirin 81 MG tablet Take 81 mg by mouth daily       No current facility-administered medications for this visit. No Known Allergies    Review of Systems  Non-contributory    Physical Exam:  Vitals:    02/10/22 1326   BP: (!) 174/84   Site: Right Upper Arm   Position: Sitting   Cuff Size: Large Adult   Pulse: 75   Resp: 16   Temp: 97.9 °F (36.6 °C)   TempSrc: Infrared   SpO2: 95%   Weight: 160 lb (72.6 kg)   Height: 5' 6\" (1.676 m)       Body mass index is 25.82 kg/m². Physical Exam  Constitutional:       General: He is not in acute distress. Appearance: He is well-developed. He is not diaphoretic. HENT:      Head: Normocephalic and atraumatic. Eyes:      General:         Right eye: No discharge. Left eye: No discharge. Cardiovascular:      Rate and Rhythm: Normal rate and regular rhythm. Heart sounds: Normal heart sounds. No murmur heard. No friction rub. No gallop. Pulmonary:      Effort: Pulmonary effort is normal. No respiratory distress. Breath sounds: Normal breath sounds. No wheezing or rales. Chest:      Chest wall: No tenderness. Abdominal:      General: Bowel sounds are normal. There is no distension. Palpations: Abdomen is soft. Abdomen is not rigid. There is no mass. Tenderness: There is no abdominal tenderness. There is no guarding or rebound. Hernia: There is no hernia in the ventral area, left inguinal area or right inguinal area. Comments: Surgical scar RLQ from appendectomy   Genitourinary:     Penis: Normal.       Testes:         Right: Mass, tenderness or swelling not present. Left: Mass, tenderness or swelling not present. Prostate: Enlarged (right lobe markedly enlarged and firm but no nodules, left lobe hypotrophic). Not tender. Rectum: Guaiac result negative (Scant amount of hemoccult negative stool). No mass, tenderness, anal fissure, external hemorrhoid or internal hemorrhoid. Normal anal tone. Musculoskeletal:         General: No deformity. Normal range of motion. Cervical back: Normal range of motion. Skin:     General: Skin is warm and dry. Coloration: Skin is not pale. Findings: No erythema or rash. Neurological:      Mental Status: He is alert and oriented to person, place, and time.    Psychiatric:         Behavior: Behavior normal.         Judgment: Judgment normal.     Electronically signed by Leslie Cruz MD on 2/10/22 at 1:52 PM EST

## 2022-02-11 ENCOUNTER — TELEPHONE (OUTPATIENT)
Dept: SURGERY | Age: 87
End: 2022-02-11

## 2022-02-11 NOTE — TELEPHONE ENCOUNTER
MA called pt and scheduled his colonoscopy for 3/4/22 @ 7:30 am with a 6:30 am arrival time (changed from 2/16/22) pt accepted date and time. MA also informed pt to call his PCP in regards to his high blood pressure prior to his colonoscopy, pt verbalized understading.   Electronically signed by Amos Hughes on 2/11/22 at 3:49 PM EST

## 2022-02-11 NOTE — TELEPHONE ENCOUNTER
PRABHU DUCKWORTHM with office contact informatin for pt to return call to inform him that he has been scheduled as requested due to transportation) on 2/16/22 @ 8:30 am and 7:30 am arrival time for colonoscopy with Dr. Sunitha Montenegro. MA also has to inform pt that due to high blood pressure reading at appointment he will need to see PCP prior to the colonoscopy. MA awaiting return call from pt to inform him of this.   Electronically signed by Johnson Walker on 2/11/22 at 11:09 AM EST

## 2022-02-11 NOTE — TELEPHONE ENCOUNTER
Prior Authorization Form:      DEMOGRAPHICS:                     Patient Name:  Kaylie Fraser  Patient :  1935            Insurance:  Payor: Abiola Garcia / Plan: Lux Briscoe ESSENTIAL/PLUS / Product Type: *No Product type* /   Insurance ID Number:    Payor/Plan Subscr  Sex Relation Sub. Ins. ID Effective Group Num   1.  [address] 1935 Male Self [id number]                                    [id number]         DIAGNOSIS & PROCEDURE:                       Procedure/Operation: COLONOSCOPY           CPT Code: 09513    Diagnosis:  RECTAL BLEEDING    ICD10 Code: K62.5    Location:  [address] [place] [address] [place]    Surgeon:  DR. Mariajose Barton    SCHEDULING INFORMATION:                          Date: 22    Time: 7:30 AM              Anesthesia:  [facility]                                                       Status:  Outpatient        Special Comments:  N/A       Electronically signed by Jami Granados on 2022 at 11:06 AM

## 2022-02-18 ENCOUNTER — OFFICE VISIT (OUTPATIENT)
Dept: FAMILY MEDICINE CLINIC | Age: 87
End: 2022-02-18
Payer: MEDICARE

## 2022-02-18 VITALS
WEIGHT: 161 LBS | TEMPERATURE: 97.9 F | HEART RATE: 80 BPM | SYSTOLIC BLOOD PRESSURE: 140 MMHG | OXYGEN SATURATION: 97 % | DIASTOLIC BLOOD PRESSURE: 72 MMHG | HEIGHT: 66 IN | BODY MASS INDEX: 25.88 KG/M2

## 2022-02-18 DIAGNOSIS — I10 RESISTANT HYPERTENSION: Primary | ICD-10-CM

## 2022-02-18 PROCEDURE — 99213 OFFICE O/P EST LOW 20 MIN: CPT | Performed by: FAMILY MEDICINE

## 2022-02-18 PROCEDURE — 99212 OFFICE O/P EST SF 10 MIN: CPT | Performed by: FAMILY MEDICINE

## 2022-02-18 RX ORDER — AMLODIPINE BESYLATE 5 MG/1
5 TABLET ORAL DAILY
Qty: 30 TABLET | Refills: 3 | Status: SHIPPED
Start: 2022-02-18 | End: 2022-03-09 | Stop reason: SDUPTHER

## 2022-02-18 NOTE — PROGRESS NOTES
S: 80 y.o. male here for HTN.   CAD. O: VS: BP (!) 169/80   Pulse 80   Temp 97.9 °F (36.6 °C) (Temporal)   Ht 5' 6\" (1.676 m)   Wt 161 lb (73 kg)   SpO2 97%   BMI 25.99 kg/m²    General: NAD, alert and interacting appropriately. CV:  RRR, no gallops, rubs, or murmurs    Resp: CTAB   Abd:  Soft, nontender   Ext:  No edema    Impression: HTN  Plan:   Start amlodipine  rtc 2 wks for HTN    Attending Physician Statement  I have discussed the case, including pertinent history and exam findings with the resident. I agree with the documented assessment and plan.

## 2022-02-18 NOTE — PROGRESS NOTES
[facility] RESIDENCY PROGRAM  DATE OF VISIT : 2022    Patient : Gene Bedolla   Age : 80 y.o.  : 1935   MRN : [id number]   ______________________________________________________________________    Chief Complaint :   Chief Complaint   Patient presents with    Pre-op Exam     colon 3/4, ins would not cover cologuard     Hypertension       HPI : Geen Bedolla is 80 y.o. male who presented to the clinic today for above chief complaint. Hypertension:  Current medications include benazepril 20 mg twice daily, metoprolol 100 mg twice daily, hydrochlorothiazide 12.5 mg daily. Patient reports compliance with medications without side effect. Blood pressure elevated on evaluation today. Reports the blood pressure readings tend to be higher in the afternoon and evening. Thinks it can be work related, though is retiring next month. Patient denies any chest pain, shortness of breath, headache, dizziness. Was sent for further blood pressure control management after systolic reading in the 615P at surgeon's office for consultation regarding colonoscopy.       Past Medical History :  Past Medical History:   Diagnosis Date    Arthritis     Fingers and knees bilaterally    CAD (coronary artery disease)     CKD (chronic kidney disease) stage 3, GFR 30-59 ml/min ([facility]) 2020    GERD (gastroesophageal reflux disease)     Hyperlipidemia     Hypertension     Myocardial infarct, old     Renal insufficiency     Valvular heart disease      Past Surgical History:   Procedure Laterality Date    APPENDECTOMY  1960    CARDIAC CATHETERIZATION  10/2006    left main 20% ostial, LAD 20% prox, first diag 40% ostial, LCX 30%prox & 30%mid, RCA no significant disease    CARDIAC CATHETERIZATION  2011    patent stent    CARDIAC CATHETERIZATION  2016    patent stents, LM 20% ostial, Cx luminal irregularities--medical management    COLONOSCOPY  2016    small ascending colon polyp removed (tubular adenoma), rectal polyp removed (hyperplastic), Dr Catia Caceres, [facility], [place], [address]    approximately mid to late 1990s, removed at least 1 polyp, [facility]  [facility]    approximately early to mid 2000s, removed 1 or more polyps, [facility]    approximately, removed 1 or more polyps, [facility]    CORONARY ANGIOPLASTY WITH STENT PLACEMENT Left 1999    stent to RCA  @ [facility]         Review of Systems :    ROS - Per HPI   ______________________________________________________________________    Physical Exam :    Wt Readings from Last 3 Encounters:   02/18/22 161 lb (73 kg)   02/10/22 160 lb (72.6 kg)   12/10/21 159 lb (72.1 kg)       BMI Readings from Last 3 Encounters:   02/18/22 25.99 kg/m²   02/10/22 25.82 kg/m²   12/10/21 25.66 kg/m²   ]      Vitals: BP (!) 169/80   Pulse 80   Temp 97.9 °F (36.6 °C) (Temporal)   Ht 5' 6\" (1.676 m)   Wt 161 lb (73 kg)   SpO2 97%   BMI 25.99 kg/m²     Physical Exam  Constitutional:       General: He is not in acute distress. Appearance: He is well-developed. HENT:      Head: Normocephalic and atraumatic. Neck:      Thyroid: No thyromegaly. Trachea: No tracheal deviation. Cardiovascular:      Rate and Rhythm: Normal rate and regular rhythm. Heart sounds: No murmur heard. Pulmonary:      Effort: Pulmonary effort is normal. No respiratory distress. Breath sounds: Normal breath sounds. No wheezing or rales. Abdominal:      General: Bowel sounds are normal. There is no distension. Palpations: Abdomen is soft. Tenderness: There is no abdominal tenderness. Musculoskeletal:      Right lower leg: No edema. Left lower leg: No edema. Neurological:      Mental Status: He is alert and oriented to person, place, and time.    Psychiatric:         Mood and Affect: Mood normal.         Behavior: Behavior normal.         ______________________________________________________________________    Assessment & Plan :     Diagnosis Orders   1. Resistant hypertension  amLODIPine (NORVASC) 5 MG tablet       Hypertension: Remains suboptimally controlled, will initiate amlodipine 5 mg, instructed to take at night. Advised patient to continue to monitor blood pressures and call with any abnormalities either low or high readings. Follow-up closely in 2 weeks. Follow up:  Return in about 2 weeks (around 3/4/2022) for follow up new medication.       Eric Ferguson MD PGY-3    Discussed with: Dr. Roberto Méndez

## 2022-03-02 ENCOUNTER — TELEPHONE (OUTPATIENT)
Dept: FAMILY MEDICINE CLINIC | Age: 87
End: 2022-03-02

## 2022-03-02 NOTE — TELEPHONE ENCOUNTER
----- Message from Kenyon Fontenot sent at 3/2/2022 12:00 PM EST -----  Subject: Medication Problem    QUESTIONS  Name of Medication? aspirin 81 MG tablet  Patient-reported dosage and instructions? Take 81 mg by mouth daily  What question or problem do you have with the medication? Patient want   know if can not take medication on friday for colonoscopy. Preferred Pharmacy? [facility], [address] phone number (if available)? [phone]  Additional Information for Provider?   ---------------------------------------------------------------------------  --------------  [address]  What is the best way for the office to contact you? OK to leave message on   voicemail  Preferred Call Back Phone Number? [phone]  ---------------------------------------------------------------------------  --------------  SCRIPT ANSWERS  Relationship to Patient?  Self

## 2022-03-02 NOTE — PROGRESS NOTES
[address] PRE-ADMISSION TESTING GENERAL INSTRUCTIONS- [facility]-phone number:[phone]    GENERAL INSTRUCTIONS  [x] Antibacterial Soap shower Night before and/or AM of Surgery  [x] Follow bowel prep instructions per surgeon. No liquids/jello that are red or purple color. [x] Nothing by mouth after midnight, including gum, candy, mints, or water. [x] You may brush your teeth, gargle, but do NOT swallow water. [x]No smoking, chewing tobacco, illegal drugs, marijuana or alcohol within 24 hours of your surgery. [x] Jewelry, valuables or body piercing's should not be brought to the hospital. All body and/or tongue piercing's must be removed prior to arriving to hospital.  ALL hair pins must be removed. [x] Do not wear makeup, lotions, powders, deodorant. Nail polish as directed by the nurse. [x] Arrange transportation with a responsible adult  to and from the hospital. If you do not have a responsible adult  to transport you, you will need to make arrangements with a medical transportation company (i.e. [employer]. A Uber/taxi/bus is not appropriate unless you are accompanied by a responsible adult ). Arrange for someone to be with you for the remainder of the day and for 24 hours after your procedure due to having had anesthesia. Who will be your  for transportation? Rachel oCok, daughter  Who will be staying with you for 24 hrs after your procedure? Rachel Cook, daughter  [x] Bring insurance card and photo ID. PARKING INSTRUCTIONS:   [x] Arrival Time: 6:30 am,    Two people may accompany you. Everyone will need to wear a mask. · [x] Parking lot '\"I\"  is located on [facility] (the corner of [facility] and [facility]). To enter, press the button and the gate will lift. A free token will be provided to exit the lot. One car per patient is allowed to park in this lot.  All other cars are to park on [address] [place] [address] either in the parking garage or the handicap lot. Walk up the front walk to the [facility], the door will be locked an employee will greet you and let you in. EDUCATION INSTRUCTIONS:        [x]Pain: Post-op pain is normal and to be expected. You will be asked to rate your pain from 0-10 (a zero is not acceptable-education is needed). Your post-op pain goal is:   [x] Ask your nurse for your pain medication. [x] Other:  Wear loose comfortable clothin    MEDICATION INSTRUCTIONS:  [x]Bring a complete list of your medications, please write the last time you took the medicine, give this list to the nurse. [x] Take the following medications the morning of surgery with 1-2 ounces of water:  Isosorbide mononitrate, metoprolol, amlodipine      [x] Stop herbal supplements, ibuprofen (Nsaids)  and vitamins 5 days before your surgery. [x] Follow physician instructions regarding any blood thinners you may be taking. WHAT TO EXPECT:  [x] The day of surgery you will be greeted and checked in by the [employer]. Please bring your photo ID and insurance card. A nurse will greet you in accordance to the time you are needed in the pre-op area to prepare you for surgery. Please do not be discouraged if you are not greeted in the order you arrive as there are many variables that are involved in patient preparation. Your patience is greatly appreciated as you wait for your nurse. Please bring in items such as: books, magazines, newspapers, electronics, or any other items  to occupy your time in the waiting area. [x]  Delays may occur with surgery and staff will make a sincere effort to keep you informed of delays. If any delays occur with your procedure, we apologize ahead of time for your inconvenience as we recognize the value of your time.

## 2022-03-03 NOTE — TELEPHONE ENCOUNTER
Detail message left with noted instructions. Requested a return call if patient had questions or concerns.

## 2022-03-04 ENCOUNTER — HOSPITAL ENCOUNTER (OUTPATIENT)
Age: 87
Setting detail: OUTPATIENT SURGERY
Discharge: HOME | End: 2022-03-04
Attending: SURGERY | Admitting: SURGERY
Payer: MEDICARE

## 2022-03-04 ENCOUNTER — ANESTHESIA (OUTPATIENT)
Dept: ENDOSCOPY | Age: 87
End: 2022-03-04
Payer: MEDICARE

## 2022-03-04 ENCOUNTER — ANESTHESIA EVENT (OUTPATIENT)
Dept: ENDOSCOPY | Age: 87
End: 2022-03-04
Payer: MEDICARE

## 2022-03-04 VITALS
WEIGHT: 155 LBS | TEMPERATURE: 97 F | RESPIRATION RATE: 18 BRPM | HEIGHT: 66 IN | DIASTOLIC BLOOD PRESSURE: 62 MMHG | OXYGEN SATURATION: 97 % | SYSTOLIC BLOOD PRESSURE: 112 MMHG | HEART RATE: 51 BPM | BODY MASS INDEX: 24.91 KG/M2

## 2022-03-04 VITALS
SYSTOLIC BLOOD PRESSURE: 96 MMHG | DIASTOLIC BLOOD PRESSURE: 54 MMHG | RESPIRATION RATE: 11 BRPM | OXYGEN SATURATION: 96 %

## 2022-03-04 PROCEDURE — 7100000010 HC PHASE II RECOVERY - FIRST 15 MIN: Performed by: SURGERY

## 2022-03-04 PROCEDURE — 45380 COLONOSCOPY AND BIOPSY: CPT | Performed by: SURGERY

## 2022-03-04 PROCEDURE — 6360000002 HC RX W HCPCS: Performed by: ANESTHESIOLOGIST ASSISTANT

## 2022-03-04 PROCEDURE — 3700000001 HC ADD 15 MINUTES (ANESTHESIA): Performed by: SURGERY

## 2022-03-04 PROCEDURE — 3609010600 HC COLONOSCOPY POLYPECTOMY SNARE/COLD BIOPSY: Performed by: SURGERY

## 2022-03-04 PROCEDURE — 7100000011 HC PHASE II RECOVERY - ADDTL 15 MIN: Performed by: SURGERY

## 2022-03-04 PROCEDURE — 2709999900 HC NON-CHARGEABLE SUPPLY: Performed by: SURGERY

## 2022-03-04 PROCEDURE — 88305 TISSUE EXAM BY PATHOLOGIST: CPT

## 2022-03-04 PROCEDURE — 2580000003 HC RX 258: Performed by: SURGERY

## 2022-03-04 PROCEDURE — 3700000000 HC ANESTHESIA ATTENDED CARE: Performed by: SURGERY

## 2022-03-04 RX ORDER — SODIUM CHLORIDE 0.9 % (FLUSH) 0.9 %
10 SYRINGE (ML) INJECTION PRN
Status: DISCONTINUED | OUTPATIENT
Start: 2022-03-04 | End: 2022-03-04 | Stop reason: HOSPADM

## 2022-03-04 RX ORDER — SODIUM CHLORIDE 0.9 % (FLUSH) 0.9 %
10 SYRINGE (ML) INJECTION EVERY 12 HOURS SCHEDULED
Status: DISCONTINUED | OUTPATIENT
Start: 2022-03-04 | End: 2022-03-04 | Stop reason: HOSPADM

## 2022-03-04 RX ORDER — SODIUM CHLORIDE 9 MG/ML
25 INJECTION, SOLUTION INTRAVENOUS PRN
Status: DISCONTINUED | OUTPATIENT
Start: 2022-03-04 | End: 2022-03-04 | Stop reason: HOSPADM

## 2022-03-04 RX ORDER — HYDROCORTISONE 25 MG/G
CREAM TOPICAL
Qty: 1 EACH | Refills: 3 | Status: SHIPPED | OUTPATIENT
Start: 2022-03-04

## 2022-03-04 RX ORDER — PROPOFOL 10 MG/ML
INJECTION, EMULSION INTRAVENOUS PRN
Status: DISCONTINUED | OUTPATIENT
Start: 2022-03-04 | End: 2022-03-04 | Stop reason: SDUPTHER

## 2022-03-04 RX ORDER — SODIUM CHLORIDE, SODIUM LACTATE, POTASSIUM CHLORIDE, CALCIUM CHLORIDE 600; 310; 30; 20 MG/100ML; MG/100ML; MG/100ML; MG/100ML
INJECTION, SOLUTION INTRAVENOUS CONTINUOUS
Status: DISCONTINUED | OUTPATIENT
Start: 2022-03-04 | End: 2022-03-04 | Stop reason: HOSPADM

## 2022-03-04 RX ADMIN — SODIUM CHLORIDE 25 ML: 9 INJECTION, SOLUTION INTRAVENOUS at 07:07

## 2022-03-04 RX ADMIN — PROPOFOL 160 MG: 10 INJECTION, EMULSION INTRAVENOUS at 07:44

## 2022-03-04 ASSESSMENT — PAIN - FUNCTIONAL ASSESSMENT: PAIN_FUNCTIONAL_ASSESSMENT: 0-10

## 2022-03-04 ASSESSMENT — PAIN SCALES - GENERAL
PAINLEVEL_OUTOF10: 0

## 2022-03-04 NOTE — OP NOTE
PROCEDURE NOTE    DATE OF PROCEDURE: 3/4/2022    SURGEON: Neftaly Richardson M.D.    ASSISTANT: None    PREOPERATIVE DIAGNOSIS: Diagnostic colonoscopy for rectal bleeding and history of colon polyps    POSTOPERATIVE DIAGNOSIS: Same with few small uncomplicated sigmoid diverticula, 3 mm diameter rectosigmoid colon polyp 15 cm from anus, 3 mm diameter polyp rectum 1 cm from anus, and perianal dermatitis with multiple anal skin tags    OPERATION: Total colonoscopy with biopsy removal rectosigmoid colon polyp and biopsy removal rectal polyp    ANESTHESIA: Local monitored anesthesia. ESTIMATED BLOOD LOSS: less than 50     COMPLICATIONS: None. SPECIMENS:  Was Obtained: Biopsy rectosigmoid colon polyp and biopsy rectal polyp    HISTORY: The patient is a 80y.o. year old male with history of above preop diagnosis. I recommended colonoscopy with possible biopsy or polypectomy and I explained the risk, benefits, expected outcome, and alternatives to the procedure. Risks included but are not limited to bleeding, infection, respiratory distress, hypotension, and perforation of the colon. The patient understands and is in agreement. PROCEDURE: The patient was given IV conscious sedation per anesthesia. The patient was given supplemental oxygen by nasal cannula. The colonoscope was inserted per rectum and advanced under direct vision to the cecum without difficulty. The prep was good so exam was adequate. FINDINGS:  Cecum/Ascending colon: normal    Transverse colon: normal    Descending/Sigmoid colon: abnormal: There were 1 or 2 small uncomplicated diverticula in sigmoid colon. There was 3 mm diameter sessile polyp rectosigmoid colon 15 cm anus was removed with biopsy. Rectum/Anus: examined in normal and retroflexed positions and was abnormal: 3 mm sessile polyp 1 cm anus removed with biopsy. Prior to insertion scope severe anal dermatitis with several anal skin tags was noted.     The colon was decompressed and the scope was removed. The withdraw time was approximately 17 minutes. The patient tolerated the procedure well. ASSESSMENT/PLAN:   1. Rectal bleeding - most likely secondary to anal dermatitis and irritation of anal skin tags. 2. Anal dermatitis and anal skin tags - Sitz bath or warm soak in tube for 30 minute followed by application of Anusol HC cream to anal area as directed, three times a day and after each BM x 2 weeks then as needed. 3. History of multiple adenomatous colon polyps with recurrent colon polyp in the rectosigmoid colon and rectum - these were removed today with biopsy. Will check pathology and contact patient with results and further recommendations for follow-up colonoscopy.   4. Uncomplicated Diverticulosis - maintain regular bowel habits and avoid constipation, hard stools, and excessive straining with stools    Electronically signed by Christopher Chauhan MD on 3/4/22 at 8:15 AM EST

## 2022-03-04 NOTE — ANESTHESIA PRE PROCEDURE
Department of Anesthesiology  Preprocedure Note       Name:  Ron Collins   Age:  80 y.o.  :  1935                                          MRN:  [id number]         Date:  3/4/2022      Surgeon: Ok Floor):  Van Jaramillo MD    Procedure: Procedure(s):  COLONOSCOPY DIAGNOSTIC    Medications prior to admission:   Prior to Admission medications    Medication Sig Start Date End Date Taking? Authorizing Provider   amLODIPine (NORVASC) 5 MG tablet Take 1 tablet by mouth daily  Patient taking differently: Take 5 mg by mouth every morning  22  Yes Francie Mcgill MD   bisacodyl (DULCOLAX) 5 MG EC tablet Take 4 tablets orally twice the day before colonoscopy as directed 2/10/22  Yes Van Jaramillo MD   benazepril (LOTENSIN) 20 MG tablet Take 1 tablet by mouth 2 times daily 10/13/21  Yes Nayeli Lares MD   atorvastatin (LIPITOR) 40 MG tablet Take 1 tablet by mouth daily 10/13/21  Yes Nayeli Lares MD   metoprolol (LOPRESSOR) 100 MG tablet Take 1 tablet by mouth 2 times daily 21  Yes Nayeli Lares MD   hydroCHLOROthiazide (MICROZIDE) 12.5 MG capsule Take 1 capsule by mouth every morning 21  Yes Nayeli Lares MD   isosorbide mononitrate (IMDUR) 60 MG extended release tablet Take 1 tablet by mouth daily  Patient taking differently: Take 60 mg by mouth every morning  21  Yes Nayeli Lares MD   Multiple Vitamin (MULTI-VITAMIN DAILY PO) Take by mouth daily   Yes Historical Provider, MD   aspirin 81 MG tablet Take 81 mg by mouth daily   Yes Historical Provider, MD   magnesium citrate solution Take 300 mLs by mouth 3 times daily for 3 doses Take one 10 ounce bottle three times the day before colonoscopy as directed 2/10/22 2/11/22  Van Jaramillo MD   nitroGLYCERIN (NITROSTAT) 0.4 MG SL tablet Place 1 tablet under the tongue every 5 minutes as needed for Chest pain up to max of 3 total doses.  If no relief after 1 dose, call 911. 21   Nayeli Lares MD       Current medications: Current Facility-Administered Medications   Medication Dose Route Frequency Provider Last Rate Last Admin    0.9 % sodium chloride infusion  25 mL IntraVENous PRN Christiano Cano  mL/hr at 03/04/22 0707 25 mL at 03/04/22 4155    lactated ringers infusion   IntraVENous Continuous Christiano Cano MD        sodium chloride flush 0.9 % injection 10 mL  10 mL IntraVENous 2 times per day Christiano Cano MD        sodium chloride flush 0.9 % injection 10 mL  10 mL IntraVENous PRN Christiano Cano MD           Allergies:  No Known Allergies    Problem List:    Patient Active Problem List   Diagnosis Code    Coronary artery disease involving native coronary artery of native heart without angina pectoris I25.10    Gastroesophageal reflux disease without esophagitis K21.9    Essential hypertension I10    Mixed hyperlipidemia E78.2    Benign non-nodular prostatic hyperplasia with lower urinary tract symptoms N40.1    First-degree atrioventricular block I44.0    CKD (chronic kidney disease) stage 3, GFR 30-59 ml/min ([facility]) N18.30    Presbycusis H91.10    Spondylosis of cervical region without myelopathy or radiculopathy M47.812       Past Medical History:        Diagnosis Date    Arthritis     Fingers and knees bilaterally    CAD (coronary artery disease)     CKD (chronic kidney disease) stage 3, GFR 30-59 ml/min ([facility] [address] 6/8/2020    GERD (gastroesophageal reflux disease) 2013    Hyperlipidemia     Hypertension     Myocardial infarct, old 1999    Renal insufficiency     Valvular heart disease        Past Surgical History:        Procedure Laterality Date    APPENDECTOMY  1960    CARDIAC CATHETERIZATION  10/2006    left main 20% ostial, LAD 20% prox, first diag 40% ostial, LCX 30%prox & 30%mid, RCA no significant disease    CARDIAC CATHETERIZATION  11/20/2011    patent stent    CARDIAC CATHETERIZATION  02/05/2016    patent stents, LM 20% ostial, Cx luminal irregularities--medical management    COLONOSCOPY  02/12/2016    small ascending colon polyp removed (tubular adenoma), rectal polyp removed (hyperplastic), Dr Alis Olsen, [facility] [place], [address] [facility] [address]    approximately mid to late 1990s, removed at least 1 polyp, [address]  [address]    approximately early to mid 2000s, removed 1 or more polyps, [facility]    approximately, removed 1 or more polyps, [facility]    CORONARY ANGIOPLASTY WITH STENT PLACEMENT Left 1999    stent to RCA  @ [facility]       Social History:    Social History     Tobacco Use    Smoking status: Never Smoker    Smokeless tobacco: Never Used   Substance Use Topics    Alcohol use: Not Currently                                Counseling given: Not Answered      Vital Signs (Current):   Vitals:    03/02/22 0852 03/04/22 0646   BP:  132/61   Pulse:  67   Resp:  18   Temp:  36.2 °C (97.1 °F)   TempSrc:  Temporal   SpO2:  97%   Weight: 155 lb (70.3 kg) 155 lb (70.3 kg)   Height: 5' 6\" (1.676 m) 5' 6\" (1.676 m)                                              BP Readings from Last 3 Encounters:   03/04/22 132/61   02/18/22 (!) 140/72   02/10/22 (!) 174/84       NPO Status: Time of last liquid consumption: 1900                        Time of last solid consumption: 1200                        Date of last liquid consumption: 03/03/22                        Date of last solid food consumption: 03/02/22    BMI:   Wt Readings from Last 3 Encounters:   03/04/22 155 lb (70.3 kg)   02/18/22 161 lb (73 kg)   02/10/22 160 lb (72.6 kg)     Body mass index is 25.02 kg/m².     CBC:   Lab Results   Component Value Date    WBC 8.7 10/03/2020    RBC 4.75 10/03/2020    HGB 15.0 10/03/2020    HCT 45.0 10/03/2020    MCV 94.7 10/03/2020    RDW 12.3 10/03/2020     10/03/2020       CMP:   Lab Results   Component Value Date     12/10/2021    K 5.0 12/10/2021    K 4.3 10/03/2020     12/10/2021    CO2 25 12/10/2021    BUN 36 12/10/2021    CREATININE 1.7 12/10/2021    GFRAA 46 12/10/2021    LABGLOM 38 12/10/2021    GLUCOSE 107 12/10/2021    PROT 6.9 2021    CALCIUM 9.7 12/10/2021    BILITOT 0.8 2021    ALKPHOS 81 2021    AST 29 2021    ALT 20 2021       POC Tests: No results for input(s): POCGLU, POCNA, POCK, POCCL, POCBUN, POCHEMO, POCHCT in the last 72 hours. Coags: No results found for: PROTIME, INR, APTT    HCG (If Applicable): No results found for: PREGTESTUR, PREGSERUM, HCG, HCGQUANT     ABGs: No results found for: PHART, PO2ART, VXF4ZZW, HKU2MDC, BEART, D2IKVZOV     Type & Screen (If Applicable):  No results found for: LABABO, LABRH    Drug/Infectious Status (If Applicable):  No results found for: HIV, HEPCAB    COVID-19 Screening (If Applicable): No results found for: COVID19    EK2021  Sinus  Bradycardia   -Old inferior infarct. ABNORMAL         Anesthesia Evaluation  Patient summary reviewed  Airway: Mallampati: III  TM distance: <3 FB   Neck ROM: full  Mouth opening: > = 3 FB Dental: normal exam     Comment: Denies chipped, cracked, loose teeth      Pulmonary:Negative Pulmonary ROS                              Cardiovascular:  Exercise tolerance: good (>4 METS),   (+) hypertension (recently started on amlodipine):, valvular problems/murmurs:, past MI (22 years ago):, CAD:, CABG/stent (stents (placed about 15-22 years ago)):, hyperlipidemia      ECG reviewed  Rhythm: regular             Beta Blocker:  Dose within 24 Hrs      ROS comment: Valvular heart disease    Left Ventricular Ejection Fraction % 55  (2016)      patent stent   CARDIAC CATHETERIZATION   2016    patent stents, LM 20% ostial, Cx luminal irregularities--medical management         Neuro/Psych:   Negative Neuro/Psych ROS              GI/Hepatic/Renal:   (+) GERD:, renal disease: CRI, bowel prep,          ROS comment: Rectal bleeding  BPH.    Endo/Other:    (+) : arthritis:., . Abdominal:             Vascular: negative vascular ROS. Other Findings:           Anesthesia Plan      MAC     ASA 3       Induction: intravenous. Anesthetic plan and risks discussed with patient and child/children (daughter at bedside). Plan discussed with attending. Nichole Echeverria   3/4/2022        Pt seen, examined, chart reviewed, plan discussed.   Adore Ochoa MD  3/4/2022  7:44 AM

## 2022-03-04 NOTE — PROGRESS NOTES
5709 Dr Rikki Mariscal here to update patient and give instructions. Patient given discharge instructions and verbalized understanding. Family present .

## 2022-03-04 NOTE — INTERVAL H&P NOTE
Update History & Physical    The patient's History and Physical of February 10, 2022 was reviewed with the patient and I examined the patient. There was no change. The surgical site was confirmed by the patient and me. Plan: The risks, benefits, expected outcome, and alternative to the recommended procedure have been discussed with the patient. Patient understands and wants to proceed with the procedure.      Electronically signed by Dianne Dunn MD on 3/4/2022 at 7:18 AM

## 2022-03-04 NOTE — ANESTHESIA POSTPROCEDURE EVALUATION
Department of Anesthesiology  Postprocedure Note    Patient: Debra Shetty  MRN: [id number]  YOB: 1935  Date of evaluation: 3/4/2022  Time:  10:46 AM     Procedure Summary     Date: 03/04/22 Room / Location: Yudelka Morales 01 / [facility]    Anesthesia Start: 8300 Anesthesia Stop:     Procedure: COLONOSCOPY DIAGNOSTIC (N/A ) Diagnosis: (RECTAL BLEEDING)    Surgeons: Lara Keita MD Responsible Provider: Roma Barnes MD    Anesthesia Type: MAC ASA Status: 3          Anesthesia Type: MAC    Hoda Phase I: Hoda Score: 10    Hoda Phase II: Hoda Score: 10    Last vitals: Reviewed and per EMR flowsheets.        Anesthesia Post Evaluation    Patient location during evaluation: PACU  Patient participation: complete - patient participated  Level of consciousness: awake  Pain score: 3  Airway patency: patent  Nausea & Vomiting: no nausea and no vomiting  Complications: no  Cardiovascular status: blood pressure returned to baseline  Respiratory status: acceptable  Hydration status: euvolemic

## 2022-03-09 ENCOUNTER — OFFICE VISIT (OUTPATIENT)
Dept: FAMILY MEDICINE CLINIC | Age: 87
End: 2022-03-09
Payer: MEDICARE

## 2022-03-09 VITALS
WEIGHT: 162 LBS | BODY MASS INDEX: 26.03 KG/M2 | DIASTOLIC BLOOD PRESSURE: 62 MMHG | SYSTOLIC BLOOD PRESSURE: 128 MMHG | HEIGHT: 66 IN | TEMPERATURE: 97.3 F | OXYGEN SATURATION: 96 % | RESPIRATION RATE: 18 BRPM | HEART RATE: 65 BPM

## 2022-03-09 DIAGNOSIS — I10 RESISTANT HYPERTENSION: Primary | ICD-10-CM

## 2022-03-09 PROBLEM — I1A.0 RESISTANT HYPERTENSION: Status: ACTIVE | Noted: 2022-03-09

## 2022-03-09 PROCEDURE — 99212 OFFICE O/P EST SF 10 MIN: CPT | Performed by: FAMILY MEDICINE

## 2022-03-09 PROCEDURE — 99213 OFFICE O/P EST LOW 20 MIN: CPT | Performed by: FAMILY MEDICINE

## 2022-03-09 RX ORDER — AMLODIPINE BESYLATE 5 MG/1
5 TABLET ORAL EVERY MORNING
Qty: 30 TABLET | Refills: 5 | Status: SHIPPED
Start: 2022-03-09 | End: 2022-06-15 | Stop reason: SDUPTHER

## 2022-03-09 SDOH — ECONOMIC STABILITY: FOOD INSECURITY: WITHIN THE PAST 12 MONTHS, YOU WORRIED THAT YOUR FOOD WOULD RUN OUT BEFORE YOU GOT MONEY TO BUY MORE.: NEVER TRUE

## 2022-03-09 SDOH — ECONOMIC STABILITY: TRANSPORTATION INSECURITY
IN THE PAST 12 MONTHS, HAS LACK OF TRANSPORTATION KEPT YOU FROM MEETINGS, WORK, OR FROM GETTING THINGS NEEDED FOR DAILY LIVING?: NO

## 2022-03-09 SDOH — ECONOMIC STABILITY: FOOD INSECURITY: WITHIN THE PAST 12 MONTHS, THE FOOD YOU BOUGHT JUST DIDN'T LAST AND YOU DIDN'T HAVE MONEY TO GET MORE.: NEVER TRUE

## 2022-03-09 SDOH — ECONOMIC STABILITY: TRANSPORTATION INSECURITY
IN THE PAST 12 MONTHS, HAS THE LACK OF TRANSPORTATION KEPT YOU FROM MEDICAL APPOINTMENTS OR FROM GETTING MEDICATIONS?: NO

## 2022-03-09 ASSESSMENT — LIFESTYLE VARIABLES: HOW OFTEN DO YOU HAVE A DRINK CONTAINING ALCOHOL: NEVER

## 2022-03-09 ASSESSMENT — SOCIAL DETERMINANTS OF HEALTH (SDOH): HOW HARD IS IT FOR YOU TO PAY FOR THE VERY BASICS LIKE FOOD, HOUSING, MEDICAL CARE, AND HEATING?: NOT HARD AT ALL

## 2022-03-09 NOTE — PROGRESS NOTES
[facility] RESIDENCY PROGRAM  DATE OF VISIT : 3/9/2022    Patient : Corey Morelos   Age : 80 y.o.  : 1935   MRN : [id number]   ______________________________________________________________________    Chief Complaint :   Chief Complaint   Patient presents with    Hypertension     f/u       HPI : Corey Morelos is 80 y.o. male who presented to the clinic today for above chief complaint. Hypertension:  Current medications include benazepril 20 mg twice daily, metoprolol 100 mg twice daily, hydrochlorothiazide 12.5 mg daily, amlodipine 5 mg daily (new medication). Patient reports compliance with medications without side effect. Blood pressure well controlled today blood pressure is well controlled on home log provided by patient. Patient denies any chest pain, shortness of breath, headache, dizziness. Had colonoscopy, pathology and follow-up with surgery pending. Tolerated procedure well.     Past Medical History :  Past Medical History:   Diagnosis Date    Arthritis     Fingers and knees bilaterally    CAD (coronary artery disease)     CKD (chronic kidney disease) stage 3, GFR 30-59 ml/min ([facility]) 2020    GERD (gastroesophageal reflux disease)     Hyperlipidemia     Hypertension     Myocardial infarct, old     Renal insufficiency     Valvular heart disease      Past Surgical History:   Procedure Laterality Date    APPENDECTOMY  1960    CARDIAC CATHETERIZATION  10/2006    left main 20% ostial, LAD 20% prox, first diag 40% ostial, LCX 30%prox & 30%mid, RCA no significant disease    CARDIAC CATHETERIZATION  2011    patent stent    CARDIAC CATHETERIZATION  2016    patent stents, LM 20% ostial, Cx luminal irregularities--medical management    COLONOSCOPY  2016    small ascending colon polyp removed (tubular adenoma), rectal polyp removed (hyperplastic), Dr Jeffrey Tomlinson, [facility], [address] [place] [address] approximately mid to late 1990s, removed at least 1 polyp, [facility]  2000    approximately early to mid 2000s, removed 1 or more polyps, [facility]    approximately, removed 1 or more polyps, [facility]    COLONOSCOPY  03/04/2022    Sigmoid diverticulosis, rectosigmoid colon polyp at 15 cm from anus removed with biopsy, rectal polyp 1 cm anus removed with biopsy, severe anal dermatitis and anal skin tags, Dr. Ida Orozco, [address] COLONOSCOPY N/A 3/4/2022    COLONOSCOPY POLYPECTOMY SNARE/COLD BIOPSY performed by Cyndi Clemons MD at [facility]    stent to RCA  @ [facility]         Review of Systems :    ROS - Per HPI   ______________________________________________________________________    Physical Exam :    Wt Readings from Last 3 Encounters:   03/09/22 162 lb (73.5 kg)   03/04/22 155 lb (70.3 kg)   02/18/22 161 lb (73 kg)       BMI Readings from Last 3 Encounters:   03/09/22 26.15 kg/m²   03/04/22 25.02 kg/m²   02/18/22 25.99 kg/m²   ]      Vitals: /62 (Site: Right Upper Arm, Position: Sitting, Cuff Size: Medium Adult)   Pulse 65   Temp 97.3 °F (36.3 °C) (Temporal)   Resp 18   Ht 5' 6\" (1.676 m)   Wt 162 lb (73.5 kg)   SpO2 96%   BMI 26.15 kg/m²     Physical Exam  Constitutional:       General: He is not in acute distress. Appearance: He is well-developed. HENT:      Head: Normocephalic and atraumatic. Neck:      Thyroid: No thyromegaly. Trachea: No tracheal deviation. Cardiovascular:      Rate and Rhythm: Normal rate and regular rhythm. Heart sounds: No murmur heard. Pulmonary:      Effort: Pulmonary effort is normal. No respiratory distress. Breath sounds: Normal breath sounds. No wheezing or rales. Abdominal:      General: Bowel sounds are normal. There is no distension. Palpations: Abdomen is soft. Tenderness:  There is no abdominal tenderness. Musculoskeletal:      Right lower leg: No edema. Left lower leg: No edema. Neurological:      Mental Status: He is alert and oriented to person, place, and time. Psychiatric:         Mood and Affect: Mood normal.         Behavior: Behavior normal.         ______________________________________________________________________    Assessment & Plan :     Diagnosis Orders   1. Resistant hypertension  amLODIPine (NORVASC) 5 MG tablet       Hypertension: Well-controlled with the addition of amlodipine, continue, refills provided, continue all other medications as previously prescribed  Await results of colonoscopy, follow-up with general surgery as recommended  Health maintenance: Did receive COVID immunization from outside pharmacy, advised patient to bring card at next visit      Follow up:  Return in about 3 months (around 6/9/2022) for follow up of chronic conditions.       Omid Mendez MD PGY-3    Discussed with: Dr. Maritza Bishop

## 2022-03-09 NOTE — PROGRESS NOTES
S: 80 y.o. male presents today for:   Here for BP follow-up. Stable on current meds. Much improved since last visit. No CP, diaphoresis, SOB, palp, HA, visual issues. O: VS: /62 (Site: Right Upper Arm, Position: Sitting, Cuff Size: Medium Adult)   Pulse 65   Temp 97.3 °F (36.3 °C) (Temporal)   Resp 18   Ht 5' 6\" (1.676 m)   Wt 162 lb (73.5 kg)   SpO2 96%   BMI 26.15 kg/m²   AAO/NAD, appropriate affect for mood  CV:  RRR, no murmur  Resp: CTAB    Impression/Plan:   1) HTN- much improved    Attending Physician Statement  I have discussed the case, including pertinent history and exam findings with the resident. I agree with the documented assessment and plan.       Josefina Eddy, DO

## 2022-06-15 ENCOUNTER — OFFICE VISIT (OUTPATIENT)
Dept: FAMILY MEDICINE CLINIC | Age: 87
End: 2022-06-15
Payer: MEDICARE

## 2022-06-15 VITALS
HEIGHT: 66 IN | OXYGEN SATURATION: 96 % | HEART RATE: 66 BPM | RESPIRATION RATE: 18 BRPM | WEIGHT: 162 LBS | BODY MASS INDEX: 26.03 KG/M2 | DIASTOLIC BLOOD PRESSURE: 57 MMHG | TEMPERATURE: 98.2 F | SYSTOLIC BLOOD PRESSURE: 110 MMHG

## 2022-06-15 DIAGNOSIS — Z00.00 MEDICARE ANNUAL WELLNESS VISIT, SUBSEQUENT: Primary | ICD-10-CM

## 2022-06-15 DIAGNOSIS — I25.10 CORONARY ARTERY DISEASE INVOLVING NATIVE HEART WITHOUT ANGINA PECTORIS, UNSPECIFIED VESSEL OR LESION TYPE: ICD-10-CM

## 2022-06-15 DIAGNOSIS — I10 ESSENTIAL HYPERTENSION: Chronic | ICD-10-CM

## 2022-06-15 DIAGNOSIS — I10 RESISTANT HYPERTENSION: ICD-10-CM

## 2022-06-15 DIAGNOSIS — E78.2 MIXED HYPERLIPIDEMIA: ICD-10-CM

## 2022-06-15 LAB
ANION GAP SERPL CALCULATED.3IONS-SCNC: 14 MMOL/L (ref 7–16)
BUN BLDV-MCNC: 37 MG/DL (ref 6–23)
CALCIUM SERPL-MCNC: 9.1 MG/DL (ref 8.6–10.2)
CHLORIDE BLD-SCNC: 100 MMOL/L (ref 98–107)
CHOLESTEROL, TOTAL: 171 MG/DL (ref 0–199)
CO2: 23 MMOL/L (ref 22–29)
CREAT SERPL-MCNC: 1.7 MG/DL (ref 0.7–1.2)
GFR AFRICAN AMERICAN: 46
GFR NON-AFRICAN AMERICAN: 38 ML/MIN/1.73
GLUCOSE BLD-MCNC: 110 MG/DL (ref 74–99)
HDLC SERPL-MCNC: 49 MG/DL
LDL CHOLESTEROL CALCULATED: 70 MG/DL (ref 0–99)
POTASSIUM SERPL-SCNC: 4.8 MMOL/L (ref 3.5–5)
SODIUM BLD-SCNC: 137 MMOL/L (ref 132–146)
TRIGL SERPL-MCNC: 259 MG/DL (ref 0–149)
VLDLC SERPL CALC-MCNC: 52 MG/DL

## 2022-06-15 PROCEDURE — 36415 COLL VENOUS BLD VENIPUNCTURE: CPT | Performed by: FAMILY MEDICINE

## 2022-06-15 PROCEDURE — 99397 PER PM REEVAL EST PAT 65+ YR: CPT | Performed by: FAMILY MEDICINE

## 2022-06-15 PROCEDURE — G0439 PPPS, SUBSEQ VISIT: HCPCS | Performed by: FAMILY MEDICINE

## 2022-06-15 PROCEDURE — 1123F ACP DISCUSS/DSCN MKR DOCD: CPT | Performed by: FAMILY MEDICINE

## 2022-06-15 RX ORDER — METOPROLOL TARTRATE 100 MG/1
100 TABLET ORAL 2 TIMES DAILY
Qty: 180 TABLET | Refills: 3 | Status: SHIPPED | OUTPATIENT
Start: 2022-06-15

## 2022-06-15 RX ORDER — AMLODIPINE BESYLATE 5 MG/1
5 TABLET ORAL EVERY MORNING
Qty: 30 TABLET | Refills: 5 | Status: SHIPPED | OUTPATIENT
Start: 2022-06-15

## 2022-06-15 RX ORDER — NITROGLYCERIN 0.4 MG/1
0.4 TABLET SUBLINGUAL EVERY 5 MIN PRN
Qty: 25 TABLET | Refills: 3 | Status: SHIPPED | OUTPATIENT
Start: 2022-06-15

## 2022-06-15 RX ORDER — HYDROCHLOROTHIAZIDE 12.5 MG/1
12.5 CAPSULE, GELATIN COATED ORAL EVERY MORNING
Qty: 90 CAPSULE | Refills: 3 | Status: SHIPPED | OUTPATIENT
Start: 2022-06-15

## 2022-06-15 RX ORDER — ISOSORBIDE MONONITRATE 60 MG/1
60 TABLET, EXTENDED RELEASE ORAL DAILY
Qty: 90 TABLET | Refills: 3 | Status: SHIPPED | OUTPATIENT
Start: 2022-06-15

## 2022-06-15 RX ORDER — BENAZEPRIL HYDROCHLORIDE 20 MG/1
20 TABLET ORAL 2 TIMES DAILY
Qty: 180 TABLET | Refills: 3 | Status: SHIPPED | OUTPATIENT
Start: 2022-06-15

## 2022-06-15 RX ORDER — ATORVASTATIN CALCIUM 40 MG/1
40 TABLET, FILM COATED ORAL DAILY
Qty: 90 TABLET | Refills: 3 | Status: SHIPPED | OUTPATIENT
Start: 2022-06-15

## 2022-06-15 ASSESSMENT — PATIENT HEALTH QUESTIONNAIRE - PHQ9
SUM OF ALL RESPONSES TO PHQ QUESTIONS 1-9: 0
SUM OF ALL RESPONSES TO PHQ9 QUESTIONS 1 & 2: 0
SUM OF ALL RESPONSES TO PHQ QUESTIONS 1-9: 0
1. LITTLE INTEREST OR PLEASURE IN DOING THINGS: 0
2. FEELING DOWN, DEPRESSED OR HOPELESS: 0
SUM OF ALL RESPONSES TO PHQ QUESTIONS 1-9: 0
SUM OF ALL RESPONSES TO PHQ QUESTIONS 1-9: 0

## 2022-06-15 ASSESSMENT — LIFESTYLE VARIABLES: HOW OFTEN DO YOU HAVE A DRINK CONTAINING ALCOHOL: NEVER

## 2022-06-15 NOTE — PROGRESS NOTES
S: 80 y.o. male presents today for: AWV, discussed  HTN- well controlled    O: VS: BP (!) 110/57 (Site: Right Upper Arm, Position: Sitting, Cuff Size: Medium Adult)   Pulse 66   Temp 98.2 °F (36.8 °C) (Temporal)   Resp 18   Ht 5' 6\" (1.676 m)   Wt 162 lb (73.5 kg)   SpO2 96%   BMI 26.15 kg/m²   AAO/NAD, appropriate affect for mood  CV:  RRR, no murmur  Resp: CTAB  Ext- DPP-B, no clubbing, cyanosis, edema    Impression/Plan:   1) AWV- discussed   2) HTN- stable    Attending Physician Statement  I have discussed the case, including pertinent history and exam findings with the resident. I agree with the documented assessment and plan.       Josefina Eddy, DO

## 2022-06-15 NOTE — PATIENT INSTRUCTIONS
Personalized Preventive Plan for Cliff Koenig - 6/15/2022  Medicare offers a range of preventive health benefits. Some of the tests and screenings are paid in full while other may be subject to a deductible, co-insurance, and/or copay. Some of these benefits include a comprehensive review of your medical history including lifestyle, illnesses that may run in your family, and various assessments and screenings as appropriate. After reviewing your medical record and screening and assessments performed today your provider may have ordered immunizations, labs, imaging, and/or referrals for you. A list of these orders (if applicable) as well as your Preventive Care list are included within your After Visit Summary for your review. Other Preventive Recommendations:    · A preventive eye exam performed by an eye specialist is recommended every 1-2 years to screen for glaucoma; cataracts, macular degeneration, and other eye disorders. · A preventive dental visit is recommended every 6 months. · Try to get at least 150 minutes of exercise per week or 10,000 steps per day on a pedometer . · Order or download the FREE \"Exercise & Physical Activity: Your Everyday Guide\" from [employer]. Call 7-289.129.6546 or search [employer] online. · You need 3100-8992 mg of calcium and 5454-8468 IU of vitamin D per day. It is possible to meet your calcium requirement with diet alone, but a vitamin D supplement is usually necessary to meet this goal.  · When exposed to the sun, use a sunscreen that protects against both UVA and UVB radiation with an SPF of 30 or greater. Reapply every 2 to 3 hours or after sweating, drying off with a towel, or swimming. · Always wear a seat belt when traveling in a car. Always wear a helmet when riding a bicycle or motorcycle.

## 2022-06-15 NOTE — PROGRESS NOTES
Medicare Annual Wellness Visit    Yessenia Burns is here for Medicare AWV    Assessment & Plan   Medicare annual wellness visit, subsequent  Resistant hypertension  -     amLODIPine (NORVASC) 5 MG tablet; Take 1 tablet by mouth every morning, Disp-30 tablet, R-5Normal  -     isosorbide mononitrate (IMDUR) 60 MG extended release tablet; Take 1 tablet by mouth daily, Disp-90 tablet, R-3Normal  -     Basic Metabolic Panel; Future  Essential hypertension  -     benazepril (LOTENSIN) 20 MG tablet; Take 1 tablet by mouth 2 times daily, Disp-180 tablet, R-3Normal  -     metoprolol (LOPRESSOR) 100 MG tablet; Take 1 tablet by mouth 2 times daily, Disp-180 tablet, R-3Normal  -     hydroCHLOROthiazide (MICROZIDE) 12.5 MG capsule; Take 1 capsule by mouth every morning, Disp-90 capsule, R-3Normal  Mixed hyperlipidemia  -     LIPID PANEL; Future  Coronary artery disease involving native heart without angina pectoris, unspecified vessel or lesion type  -     nitroGLYCERIN (NITROSTAT) 0.4 MG SL tablet; Place 1 tablet under the tongue every 5 minutes as needed for Chest pain up to max of 3 total doses. If no relief after 1 dose, call 911., Disp-25 tablet, R-3Normal  -     isosorbide mononitrate (IMDUR) 60 MG extended release tablet; Take 1 tablet by mouth daily, Disp-90 tablet, R-3Normal      Recommendations for Preventive Services Due: see orders and patient instructions/AVS.  Recommended screening schedule for the next 5-10 years is provided to the patient in written form: see Patient Instructions/AVS.     Return in 3 days (on 6/18/2022) for new provider appt. Subjective   The following acute and/or chronic problems were also addressed today:  Hypertension:  Current medications include benazepril 20 mg twice daily, metoprolol 100 mg twice daily, hydrochlorothiazide 12.5 mg daily, amlodipine 5 mg daily. Patient reports compliance with medications without side effect.   Blood pressure well controlled today blood pressure is well controlled on home log provided by patient. Patient denies any chest pain, shortness of breath, headache, dizziness. CKD stable per last BMP, due for recheck. Will be seeing cardiology later this month for CAD. No chest pain or shortness of breath. Patient's complete Health Risk Assessment and screening values have been reviewed and are found in Flowsheets. The following problems were reviewed today and where indicated follow up appointments were made and/or referrals ordered. Positive Risk Factor Screenings with Interventions:               General Health and ACP:  General  In general, how would you say your health is?: Very Good  In the past 7 days, have you experienced any of the following: New or Increased Pain, New or Increased Fatigue, Loneliness, Social Isolation, Stress or Anger?: No  Do you get the social and emotional support that you need?: Yes  Do you have a Living Will?: (!) No    Advance Directives     Power of  Living Will ACP-Advance Directive ACP-Power of     Not on File Not on File Not on File Not on File      General Health Risk Interventions:  · No Living Will: Given documents, will call for referral to ACP specialist if needed              Objective   Vitals:    06/15/22 1339   BP: (!) 110/57   Site: Right Upper Arm   Position: Sitting   Cuff Size: Medium Adult   Pulse: 66   Resp: 18   Temp: 98.2 °F (36.8 °C)   TempSrc: Temporal   SpO2: 96%   Weight: 162 lb (73.5 kg)   Height: 5' 6\" (1.676 m)      Body mass index is 26.15 kg/m².         General Appearance: alert and oriented to person, place and time, well-developed and well-nourished, in no acute distress  Head: normocephalic and atraumatic  Pulmonary/Chest: clear to auscultation bilaterally- no wheezes, rales or rhonchi, normal air movement, no respiratory distress  Cardiovascular: normal rate, normal S1 and S2, no gallops, intact distal pulses and no carotid bruits  Abdomen: soft, non-tender, non-distended, normal bowel sounds, no masses or organomegaly  Extremities: no cyanosis and no clubbing       No Known Allergies  Prior to Visit Medications    Medication Sig Taking? Authorizing Provider   amLODIPine (NORVASC) 5 MG tablet Take 1 tablet by mouth every morning Yes Miriam Rivera MD   nitroGLYCERIN (NITROSTAT) 0.4 MG SL tablet Place 1 tablet under the tongue every 5 minutes as needed for Chest pain up to max of 3 total doses. If no relief after 1 dose, call 911. Yes Miriam Rivera MD   benazepril (LOTENSIN) 20 MG tablet Take 1 tablet by mouth 2 times daily Yes Miriam Rivera MD   atorvastatin (LIPITOR) 40 MG tablet Take 1 tablet by mouth daily Yes Miriam Rivera MD   metoprolol (LOPRESSOR) 100 MG tablet Take 1 tablet by mouth 2 times daily Yes Miriam Rivera MD   hydroCHLOROthiazide (MICROZIDE) 12.5 MG capsule Take 1 capsule by mouth every morning Yes Miriam Rivera MD   isosorbide mononitrate (IMDUR) 60 MG extended release tablet Take 1 tablet by mouth daily Yes Miriam Rivera MD   hydrocortisone (ANUSOL-HC) 2.5 % CREA rectal cream Apply to anal area as directed 3 times a day and after each BM.  Yes Tippo MD Aniya   Multiple Vitamin (MULTI-VITAMIN DAILY PO) Take by mouth daily Yes Historical Provider, MD   aspirin 81 MG tablet Take 81 mg by mouth daily Yes Historical Provider, MD [employer] (Including outside providers/suppliers regularly involved in providing care):   Patient Care Team:  Miriam Rivera MD as PCP - General (Family Medicine)  Joy Ramirez MD as Consulting Physician (Cardiology)     Reviewed and updated this visit:  Tobacco  Allergies  Meds  Problems  Med Hx  Surg Hx  Soc Hx  Fam Hx

## 2022-07-12 ENCOUNTER — OFFICE VISIT (OUTPATIENT)
Dept: CARDIOLOGY CLINIC | Age: 87
End: 2022-07-12
Payer: MEDICARE

## 2022-07-12 VITALS
HEIGHT: 66 IN | SYSTOLIC BLOOD PRESSURE: 122 MMHG | WEIGHT: 159.8 LBS | DIASTOLIC BLOOD PRESSURE: 78 MMHG | HEART RATE: 73 BPM | BODY MASS INDEX: 25.68 KG/M2 | RESPIRATION RATE: 16 BRPM

## 2022-07-12 DIAGNOSIS — N18.31 STAGE 3A CHRONIC KIDNEY DISEASE (HCC): Chronic | ICD-10-CM

## 2022-07-12 DIAGNOSIS — I10 ESSENTIAL HYPERTENSION: Chronic | ICD-10-CM

## 2022-07-12 DIAGNOSIS — I44.0 FIRST-DEGREE ATRIOVENTRICULAR BLOCK: ICD-10-CM

## 2022-07-12 DIAGNOSIS — E78.2 MIXED HYPERLIPIDEMIA: Chronic | ICD-10-CM

## 2022-07-12 DIAGNOSIS — I25.10 CORONARY ARTERY DISEASE INVOLVING NATIVE CORONARY ARTERY OF NATIVE HEART WITHOUT ANGINA PECTORIS: Primary | Chronic | ICD-10-CM

## 2022-07-12 PROCEDURE — 93000 ELECTROCARDIOGRAM COMPLETE: CPT | Performed by: INTERNAL MEDICINE

## 2022-07-12 PROCEDURE — 1123F ACP DISCUSS/DSCN MKR DOCD: CPT | Performed by: INTERNAL MEDICINE

## 2022-07-12 PROCEDURE — 99214 OFFICE O/P EST MOD 30 MIN: CPT | Performed by: INTERNAL MEDICINE

## 2022-07-12 NOTE — PROGRESS NOTES
OUTPATIENT CARDIOLOGY FOLLOW-UP    Name: Karma Hendrix    Age: 80 y.o. Primary Care Physician: Mariana Mcnair MD    Date of Service: 7/12/2022    Chief Complaint: Coronary atherosclerosis, first-degree atrioventricular block, hypertension, chronic kidney disease    Interim History: Since his most recent evaluation, the patient remains compensated from cardiovascular standpoint and denies interim symptoms of anginal-like chest discomfort or other ischemic equivalents, decompensated left ventricular systolic dysfunction or volume overload. While retiring, he is remained active with no overall changes of his functional capabilities. At the time of his present evaluation he remains normotensive. Recent laboratory assessment demonstrates appropriate control of his serum lipids with no change of his renal function in the face of his chronic kidney disease. Review of Systems: The remainder of a complete multisystem review including consitutional, central nervous, respiratory, circulatory, gastrointestinal, genitourinary, endocrinologic, hematologic, musculoskeletal and psychiatric are negative.     Past Medical History:  Past Medical History:   Diagnosis Date    Arthritis     Fingers and knees bilaterally    CAD (coronary artery disease)     CKD (chronic kidney disease) stage 3, GFR 30-59 ml/min ([facility]) 6/8/2020    GERD (gastroesophageal reflux disease) 2013    Hyperlipidemia     Hypertension     Myocardial infarct, old 1999    Renal insufficiency     Valvular heart disease        Past Surgical History:  Past Surgical History:   Procedure Laterality Date    APPENDECTOMY  1960    CARDIAC CATHETERIZATION  10/2006    left main 20% ostial, LAD 20% prox, first diag 40% ostial, LCX 30%prox & 30%mid, RCA no significant disease    CARDIAC CATHETERIZATION  11/20/2011    patent stent    CARDIAC CATHETERIZATION  02/05/2016    patent stents, LM 20% ostial, Cx luminal irregularities--medical management    Year: Never true   Transportation Needs: No Transportation Needs    Lack of Transportation (Medical): No    Lack of Transportation (Non-Medical): No   Physical Activity: Insufficiently Active    Days of Exercise per Week: 2 days    Minutes of Exercise per Session: 30 min   Stress:     Feeling of Stress : Not on file   Social Connections:     Frequency of Communication with Friends and Family: Not on file    Frequency of Social Gatherings with Friends and Family: Not on file    Attends [employer] Services: Not on file    Active Member of Clubs or Organizations: Not on file    Attends Club or Organization Meetings: Not on file    Marital Status: Not on file   Intimate Partner Violence:     Fear of Current or Ex-Partner: Not on file    Emotionally Abused: Not on file    Physically Abused: Not on file    Sexually Abused: Not on file   Housing Stability:     Unable to Pay for Housing in the Last Year: Not on file    Number of [place] in the Last Year: Not on file    Unstable Housing in the Last Year: Not on file       Allergies:  No Known Allergies    Current Medications:  Current Outpatient Medications   Medication Sig Dispense Refill    amLODIPine (NORVASC) 5 MG tablet Take 1 tablet by mouth every morning 30 tablet 5    nitroGLYCERIN (NITROSTAT) 0.4 MG SL tablet Place 1 tablet under the tongue every 5 minutes as needed for Chest pain up to max of 3 total doses.  If no relief after 1 dose, call 911. 25 tablet 3    benazepril (LOTENSIN) 20 MG tablet Take 1 tablet by mouth 2 times daily 180 tablet 3    atorvastatin (LIPITOR) 40 MG tablet Take 1 tablet by mouth daily 90 tablet 3    metoprolol (LOPRESSOR) 100 MG tablet Take 1 tablet by mouth 2 times daily 180 tablet 3    hydroCHLOROthiazide (MICROZIDE) 12.5 MG capsule Take 1 capsule by mouth every morning 90 capsule 3    isosorbide mononitrate (IMDUR) 60 MG extended release tablet Take 1 tablet by mouth daily 90 tablet 3    hydrocortisone (ANUSOL-HC) 2.5 % CREA rectal cream Apply to anal area as directed 3 times a day and after each BM. 1 each 3    Multiple Vitamin (MULTI-VITAMIN DAILY PO) Take by mouth daily      aspirin 81 MG tablet Take 81 mg by mouth daily       No current facility-administered medications for this visit. Physical Exam:  /78   Pulse 73   Resp 16   Ht 5' 6\" (1.676 m)   Wt 159 lb 12.8 oz (72.5 kg)   BMI 25.79 kg/m²   Wt Readings from Last 3 Encounters:   07/12/22 159 lb 12.8 oz (72.5 kg)   06/15/22 162 lb (73.5 kg)   03/09/22 162 lb (73.5 kg)     The patient is awake, alert and in no discomfort or distress. No gross musculoskeletal deformity is present. No significant skin or nail changes are present. Gross examination of head, eyes, nose and throat are negative. Jugular venous pressure is normal and no carotid bruits are present. Normal respiratory effort is noted with no accessory muscle usage present. Lung fields are clear to ascultation. Cardiac examination is notable for a regular rate and rhythm with no palpable thrill. No gallop rhythm or cardiac murmur are identified. A benign abdominal examination is present with no masses or organomegaly. Intact pulses are present throughout all extremities and no peripheral edema is present. No focal neurologic deficits are present.     Laboratory Tests:  Lab Results   Component Value Date    CREATININE 1.7 (H) 06/15/2022    BUN 37 (H) 06/15/2022     06/15/2022    K 4.8 06/15/2022     06/15/2022    CO2 23 06/15/2022     No results found for: BNP  Lab Results   Component Value Date/Time    WBC 8.7 10/03/2020 06:07 AM    RBC 4.75 10/03/2020 06:07 AM    HGB 15.0 10/03/2020 06:07 AM    HCT 45.0 10/03/2020 06:07 AM    MCV 94.7 10/03/2020 06:07 AM    MCH 31.6 10/03/2020 06:07 AM    MCHC 33.3 10/03/2020 06:07 AM    RDW 12.3 10/03/2020 06:07 AM     10/03/2020 06:07 AM    MPV 10.1 10/03/2020 06:07 AM     No results for input(s): ALKPHOS, ALT, AST, PROT, BILITOT, BILIDIR, LABALBU in the last 72 hours. No results found for: MG  No results found for: PROTIME, INR  Lab Results   Component Value Date/Time    TSH 2.770 01/20/2020 02:38 PM     No components found for: CHLPL  Lab Results   Component Value Date    TRIG 259 (H) 06/15/2022    TRIG 241 (H) 06/03/2021    TRIG 234 (H) 01/20/2020     Lab Results   Component Value Date    HDL 49 06/15/2022    HDL 52 06/03/2021    HDL 49 01/20/2020     Lab Results   Component Value Date    LDLCALC 70 06/15/2022    [address] 65 06/03/2021 [address] 74 01/20/2020       Cardiac Tests:  ECG: A resting electrocardiogram demonstrates evidence of sinus rhythm with an early precordial transition zone and possible age undetermined inferior posterior infarct with nonspecific ST change      ASSESSMENT / PLAN: On a clinical basis, the patient remains compensated from a cardiovascular standpoint in the face of his coronary atherosclerosis and presently I have not altered his existing medical regimen. Based on the prolonged duration since most recent objective assessment of his coronary circulation, I have recommended the performance of a functional assessment of his coronary atherosclerosis with that of a gated vasodilator myocardial perfusion imaging study will provide additional recommendations as appropriate following completion review. Independent findings, continued aggressive risk factor modification of blood pressure and serum lipids will remain essential to reducing risk of future atherosclerotic development. Unless dictated by the findings of perfusion imaging, I plan to continue annual cardiovascular assessment would happily reevaluate him in the interim should additional cardiovascular difficulties or concerns arise. The patient's current medication list, allergies, problem list and results of all previously ordered testing were reviewed at today's visit.     Follow-up office visit in 1 year      Note: This report was completed using computerized voice recognition software. Every effort has been made to ensure accuracy, however; inadvertent computerized transcription errors may be present. Wilda Padilla.  Ritika Marie, [address] [facility]    An electronic copy of this follow-up progress note was forwarded to Dr. Beltran Sr

## 2022-07-18 ENCOUNTER — TELEPHONE (OUTPATIENT)
Dept: CARDIOLOGY | Age: 87
End: 2022-07-18

## 2022-07-18 NOTE — TELEPHONE ENCOUNTER
Spoke to the patient on the phone. Reminded of his 815 am stress test at [facility]. Cardiology and where to report. He was instructed on NPO after MN except meds with water but to avoid caffeine products for 12 hours prior. Covid protocol and questions reviewed. He verbalized an understanding.

## 2022-07-20 ENCOUNTER — HOSPITAL ENCOUNTER (OUTPATIENT)
Dept: CARDIOLOGY | Age: 87
Discharge: HOME | End: 2022-07-20
Payer: MEDICARE

## 2022-07-20 ENCOUNTER — TELEPHONE (OUTPATIENT)
Dept: CARDIOLOGY CLINIC | Age: 87
End: 2022-07-20

## 2022-07-20 VITALS
BODY MASS INDEX: 25.55 KG/M2 | SYSTOLIC BLOOD PRESSURE: 118 MMHG | HEIGHT: 66 IN | WEIGHT: 159 LBS | HEART RATE: 53 BPM | DIASTOLIC BLOOD PRESSURE: 62 MMHG

## 2022-07-20 DIAGNOSIS — I25.10 CORONARY ARTERY DISEASE INVOLVING NATIVE CORONARY ARTERY OF NATIVE HEART WITHOUT ANGINA PECTORIS: Chronic | ICD-10-CM

## 2022-07-20 DIAGNOSIS — I25.10 CORONARY ARTERY DISEASE INVOLVING NATIVE CORONARY ARTERY OF NATIVE HEART WITHOUT ANGINA PECTORIS: ICD-10-CM

## 2022-07-20 PROCEDURE — 3430000000 HC RX DIAGNOSTIC RADIOPHARMACEUTICAL: Performed by: INTERNAL MEDICINE

## 2022-07-20 PROCEDURE — 93017 CV STRESS TEST TRACING ONLY: CPT

## 2022-07-20 PROCEDURE — 2580000003 HC RX 258: Performed by: INTERNAL MEDICINE

## 2022-07-20 PROCEDURE — 78452 HT MUSCLE IMAGE SPECT MULT: CPT

## 2022-07-20 PROCEDURE — 6360000002 HC RX W HCPCS: Performed by: INTERNAL MEDICINE

## 2022-07-20 PROCEDURE — A9502 TC99M TETROFOSMIN: HCPCS | Performed by: INTERNAL MEDICINE

## 2022-07-20 RX ORDER — SODIUM CHLORIDE 0.9 % (FLUSH) 0.9 %
10 SYRINGE (ML) INJECTION PRN
Status: DISCONTINUED | OUTPATIENT
Start: 2022-07-20 | End: 2022-07-21 | Stop reason: HOSPADM

## 2022-07-20 RX ADMIN — TETROFOSMIN 8.2 MILLICURIE: 0.23 INJECTION, POWDER, LYOPHILIZED, FOR SOLUTION INTRAVENOUS at 08:26

## 2022-07-20 RX ADMIN — SODIUM CHLORIDE, PRESERVATIVE FREE 10 ML: 5 INJECTION INTRAVENOUS at 10:19

## 2022-07-20 RX ADMIN — REGADENOSON 0.4 MG: 0.08 INJECTION, SOLUTION INTRAVENOUS at 10:19

## 2022-07-20 RX ADMIN — SODIUM CHLORIDE, PRESERVATIVE FREE 10 ML: 5 INJECTION INTRAVENOUS at 08:26

## 2022-07-20 RX ADMIN — TETROFOSMIN 25.7 MILLICURIE: 0.23 INJECTION, POWDER, LYOPHILIZED, FOR SOLUTION INTRAVENOUS at 10:19

## 2022-07-20 RX ADMIN — SODIUM CHLORIDE, PRESERVATIVE FREE 10 ML: 5 INJECTION INTRAVENOUS at 10:20

## 2022-07-20 NOTE — TELEPHONE ENCOUNTER
----- Message from Gumaro June MD sent at 7/20/2022 12:24 PM EDT -----  Please notify patient that stress test shows arteries to remain stable with normal heart muscle function.   Continue as directed and follow-up as scheduled

## 2022-07-20 NOTE — DISCHARGE INSTRUCTIONS
[address] [facility]      Instructions to Patients    The following are the instructions for patients who have had a procedure in our office today. Patient name: Ashli Rivera    Radionuclide Activity: 40mCi of 99mTc-Tetrofosmin (Myoview)    Date Administered: 7/20/2022    Expires: 48 hours after scheduled appointment time      Patient may resume normal activity unless otherwise instructed. Patient may resume medications as normal.  If the need should arise, patient may call [phone] between the hours of 8:00am-4:30pm.  After hours there is at least one physician on-call at all times for those patients needing assistance. Patients may call [phone] and the answering service will direct the patient to one of our physicians for assistance. After the patient's test if they are going to be leaving from an airport in the near future they should take this letter with them to verify the test and radionuclide used for their test.      This letter verifies that the above named bearer received an injection of a radionuclide for medical purpose/usage only.         Electronically signed by Myra Kilpatrick on 7/20/2022 at 10:19 AM

## 2022-07-20 NOTE — PROCEDURES
imaging in the short, vertical long, and horizontal long axis demonstrated moderate size mid to basal inferolateral defect of severe intensity. This defect was fixed. There was absence of reversible ischemia. Gated SPECT left ventricular ejection fraction was calculated to be 66%, with absence of segmental wall motion abnormalities. Impression:    Moderate size mid to basal inferolateral fixed defect that is probably due to a scar due to previous myocardial infarction and less likely soft tissue attenuation artifact. Absence of reversible ischemia. Ejection fraction 66%. 4.   Low risk pharmacologic stress test.     Thank you for sending your patient to this [facility].      Electronically signed by Luca Mccormack MD on 7/20/22 at 12:00 PM EDT

## 2022-09-16 ENCOUNTER — OFFICE VISIT (OUTPATIENT)
Dept: FAMILY MEDICINE CLINIC | Age: 87
End: 2022-09-16
Payer: MEDICARE

## 2022-09-16 VITALS
BODY MASS INDEX: 25.68 KG/M2 | OXYGEN SATURATION: 98 % | HEART RATE: 60 BPM | DIASTOLIC BLOOD PRESSURE: 68 MMHG | SYSTOLIC BLOOD PRESSURE: 128 MMHG | WEIGHT: 159.13 LBS | TEMPERATURE: 97.3 F

## 2022-09-16 DIAGNOSIS — I25.10 CORONARY ARTERY DISEASE INVOLVING NATIVE CORONARY ARTERY OF NATIVE HEART WITHOUT ANGINA PECTORIS: ICD-10-CM

## 2022-09-16 DIAGNOSIS — I10 ESSENTIAL HYPERTENSION: Primary | Chronic | ICD-10-CM

## 2022-09-16 DIAGNOSIS — N18.31 STAGE 3A CHRONIC KIDNEY DISEASE (HCC): ICD-10-CM

## 2022-09-16 PROCEDURE — 99213 OFFICE O/P EST LOW 20 MIN: CPT | Performed by: STUDENT IN AN ORGANIZED HEALTH CARE EDUCATION/TRAINING PROGRAM

## 2022-09-16 PROCEDURE — 1123F ACP DISCUSS/DSCN MKR DOCD: CPT | Performed by: STUDENT IN AN ORGANIZED HEALTH CARE EDUCATION/TRAINING PROGRAM

## 2022-09-16 PROCEDURE — 99212 OFFICE O/P EST SF 10 MIN: CPT | Performed by: STUDENT IN AN ORGANIZED HEALTH CARE EDUCATION/TRAINING PROGRAM

## 2022-09-16 NOTE — PROGRESS NOTES
S: 80 y.o. male here for transfer of care and CAD s/p MI, valvular dz, CKD, HLD, HTN. HTN controlled. CAD. Lipitor. Imdur. Metoprolol. Asa. Benazepril. No cp or sob. CKD. Benazepril. O: VS: /68   Pulse 60   Temp 97.3 °F (36.3 °C) (Temporal)   Wt 159 lb 2 oz (72.2 kg)   SpO2 98%   BMI 25.68 kg/m²    General: NAD, alert and interacting appropriately. CV:  RRR, no gallops, rubs, or murmurs    Resp: CTAB   Abd:  Soft, nontender   Ext:  1+ ble edema    Impression: CAD s/p MI, CKD, HTN. Plan:   CPM HTN  CPM CAD  CPM CKD  Rtc for AWV    Attending Physician Statement  I have discussed the case, including pertinent history and exam findings with the resident. I agree with the documented assessment and plan.

## 2022-09-16 NOTE — PROGRESS NOTES
CC: Angela Cortes is a 80 y.o. yo male is here for evaluation evaluation for the following chronic medical concerns: Established New Doctor. He has a past medical history of Arthritis, CAD (coronary artery disease), CKD (chronic kidney disease) stage 3, GFR 30-59 ml/min ([facility]), GERD (gastroesophageal reflux disease), Hyperlipidemia, Hypertension, Myocardial infarct, old, Renal insufficiency, and Valvular heart disease. HPI:    Hypertension:  Current medications include benazepril 20 mg twice daily, metoprolol 100 mg twice daily, hydrochlorothiazide 12.5 mg daily, amlodipine 5 mg daily. Patient reports compliance with medications without side effect. Blood pressure well controlled today blood pressure is well controlled on home log provided by patient. Patient denies any chest pain, shortness of breath, headache, dizziness. CKD stable per last BMP. Most recent creatinine was 1.7 in June unchanged from previous one in 12/21. CAD   Patient has a history of myocardial infarction. He is on Lipitor 40 mg. He saw his cardiologist, Dr. Himanshu Segal, recently and regularly follow up with him. His Lexiscan stress test showed showed arteries remain stable with normal heart muscle function. Patient denies No chest pain or shortness of breath, hear palpitation or uncomfortable skip beats. ROS negative unless otherwise noted    Current Outpatient Medications on File Prior to Visit   Medication Sig Dispense Refill    amLODIPine (NORVASC) 5 MG tablet Take 1 tablet by mouth every morning 30 tablet 5    nitroGLYCERIN (NITROSTAT) 0.4 MG SL tablet Place 1 tablet under the tongue every 5 minutes as needed for Chest pain up to max of 3 total doses.  If no relief after 1 dose, call 911. 25 tablet 3    benazepril (LOTENSIN) 20 MG tablet Take 1 tablet by mouth 2 times daily 180 tablet 3    atorvastatin (LIPITOR) 40 MG tablet Take 1 tablet by mouth daily 90 tablet 3    metoprolol (LOPRESSOR) 100 MG tablet Take 1 tablet by mouth 2 times daily 180 tablet 3    hydroCHLOROthiazide (MICROZIDE) 12.5 MG capsule Take 1 capsule by mouth every morning 90 capsule 3    isosorbide mononitrate (IMDUR) 60 MG extended release tablet Take 1 tablet by mouth daily 90 tablet 3    hydrocortisone (ANUSOL-HC) 2.5 % CREA rectal cream Apply to anal area as directed 3 times a day and after each BM. 1 each 3    Multiple Vitamin (MULTI-VITAMIN DAILY PO) Take by mouth daily      aspirin 81 MG tablet Take 81 mg by mouth daily       No current facility-administered medications on file prior to visit. Vitals:  Blood pressure 128/68, pulse 60, temperature 97.3 °F (36.3 °C), temperature source Temporal, weight 159 lb 2 oz (72.2 kg), SpO2 98 %. Wt Readings from Last 3 Encounters:   09/16/22 159 lb 2 oz (72.2 kg)   07/20/22 159 lb (72.1 kg)   07/12/22 159 lb 12.8 oz (72.5 kg)       PE:  Constitutional - alert, well appearing, and in no distress  Eyes - extraocular eye movements intact, left eye normal, right eye normal, no conjunctivitis noted  Neck - symmetric, no obvious masses noted  Respiratory- clear to auscultation, no wheezes, rales or rhonchi, symmetric air entry; no increased work of breathing  Cardiovascular - normal rate, regular rhythm, normal S1, S2, no murmurs, rubs, clicks or gallops  Extremities - 1+ pitting edema noted  Abdomen - soft, nontender, nondistended  Skin - normal coloration and turgor, no rashes, no suspicious skin lesions noted  Neurological - no obvious CN deficits or focal neurological deficits  Psychiatric - alert, oriented; normal mood, behavior, speech, dress      A / P:  Chato Lunsford was seen today for established new doctor.     Diagnoses and all orders for this visit:    Essential hypertension      -    Stable, continue current management    Stage 3a chronic kidney disease (HCC)      -    Stable, continue current management    Coronary artery disease involving native coronary artery of native heart without angina pectoris      - Sees his cardiologist, Dr. Mathieu Rosen, regularly      -    Stable, continue current management      RTO: Return in about 6 months (around 3/16/2023) for AWV.       This case was discussed with attending physician: Dr. Gwendolyn Garland    An electronic signature was used to authenticate this note.  ---- Carry MD Rhett on 9/18/2022 at 4:15 PM

## 2023-01-05 DIAGNOSIS — I25.10 CORONARY ARTERY DISEASE INVOLVING NATIVE HEART WITHOUT ANGINA PECTORIS, UNSPECIFIED VESSEL OR LESION TYPE: ICD-10-CM

## 2023-01-05 DIAGNOSIS — I10 RESISTANT HYPERTENSION: ICD-10-CM

## 2023-01-05 DIAGNOSIS — I10 ESSENTIAL HYPERTENSION: Chronic | ICD-10-CM

## 2023-01-08 RX ORDER — ATORVASTATIN CALCIUM 40 MG/1
40 TABLET, FILM COATED ORAL DAILY
Qty: 90 TABLET | Refills: 3 | Status: SHIPPED | OUTPATIENT
Start: 2023-01-08

## 2023-01-08 RX ORDER — BENAZEPRIL HYDROCHLORIDE 20 MG/1
20 TABLET ORAL 2 TIMES DAILY
Qty: 180 TABLET | Refills: 3 | Status: SHIPPED | OUTPATIENT
Start: 2023-01-08

## 2023-01-08 RX ORDER — NITROGLYCERIN 0.4 MG/1
0.4 TABLET SUBLINGUAL EVERY 5 MIN PRN
Qty: 25 TABLET | Refills: 3 | Status: SHIPPED | OUTPATIENT
Start: 2023-01-08

## 2023-01-08 RX ORDER — AMLODIPINE BESYLATE 5 MG/1
5 TABLET ORAL EVERY MORNING
Qty: 90 TABLET | Refills: 5 | Status: SHIPPED | OUTPATIENT
Start: 2023-01-08

## 2023-01-08 RX ORDER — ISOSORBIDE MONONITRATE 60 MG/1
60 TABLET, EXTENDED RELEASE ORAL DAILY
Qty: 90 TABLET | Refills: 3 | Status: SHIPPED | OUTPATIENT
Start: 2023-01-08

## 2023-01-08 RX ORDER — HYDROCHLOROTHIAZIDE 12.5 MG/1
12.5 CAPSULE, GELATIN COATED ORAL EVERY MORNING
Qty: 90 CAPSULE | Refills: 3 | Status: SHIPPED | OUTPATIENT
Start: 2023-01-08

## 2023-01-08 RX ORDER — METOPROLOL TARTRATE 100 MG/1
100 TABLET ORAL 2 TIMES DAILY
Qty: 180 TABLET | Refills: 3 | Status: SHIPPED | OUTPATIENT
Start: 2023-01-08

## 2023-01-23 ENCOUNTER — OFFICE VISIT (OUTPATIENT)
Dept: FAMILY MEDICINE CLINIC | Age: 88
End: 2023-01-23
Payer: MEDICARE

## 2023-01-23 VITALS
TEMPERATURE: 97.5 F | HEART RATE: 70 BPM | SYSTOLIC BLOOD PRESSURE: 112 MMHG | BODY MASS INDEX: 25.99 KG/M2 | DIASTOLIC BLOOD PRESSURE: 69 MMHG | OXYGEN SATURATION: 96 % | WEIGHT: 161 LBS

## 2023-01-23 DIAGNOSIS — I10 ESSENTIAL HYPERTENSION: Primary | ICD-10-CM

## 2023-01-23 DIAGNOSIS — I25.10 CORONARY ARTERY DISEASE INVOLVING NATIVE CORONARY ARTERY OF NATIVE HEART WITHOUT ANGINA PECTORIS: ICD-10-CM

## 2023-01-23 DIAGNOSIS — E78.2 MIXED HYPERLIPIDEMIA: ICD-10-CM

## 2023-01-23 DIAGNOSIS — N18.30 STAGE 3 CHRONIC KIDNEY DISEASE, UNSPECIFIED WHETHER STAGE 3A OR 3B CKD (HCC): ICD-10-CM

## 2023-01-23 PROCEDURE — 99213 OFFICE O/P EST LOW 20 MIN: CPT | Performed by: STUDENT IN AN ORGANIZED HEALTH CARE EDUCATION/TRAINING PROGRAM

## 2023-01-23 PROCEDURE — 1123F ACP DISCUSS/DSCN MKR DOCD: CPT | Performed by: STUDENT IN AN ORGANIZED HEALTH CARE EDUCATION/TRAINING PROGRAM

## 2023-01-23 ASSESSMENT — PATIENT HEALTH QUESTIONNAIRE - PHQ9
SUM OF ALL RESPONSES TO PHQ QUESTIONS 1-9: 0
SUM OF ALL RESPONSES TO PHQ QUESTIONS 1-9: 0
SUM OF ALL RESPONSES TO PHQ9 QUESTIONS 1 & 2: 0
SUM OF ALL RESPONSES TO PHQ QUESTIONS 1-9: 0
2. FEELING DOWN, DEPRESSED OR HOPELESS: 0
1. LITTLE INTEREST OR PLEASURE IN DOING THINGS: 0
SUM OF ALL RESPONSES TO PHQ QUESTIONS 1-9: 0

## 2023-01-23 NOTE — PROGRESS NOTES
CC: Jonah Reynolds is a 87 y.o. yo male is here for evaluation evaluation for the following chronic medical concerns: Follow-up and Hypertension. He has a past medical history of Arthritis, CAD (coronary artery disease), CKD (chronic kidney disease) stage 3, GFR 30-59 ml/min ([facility]), GERD (gastroesophageal reflux disease), Hyperlipidemia, Hypertension, Myocardial infarct, old, Renal insufficiency, and Valvular heart disease.      HPI:    Hypertension:  Current medications include benazepril 20 mg twice daily, metoprolol 100 mg twice daily, hydrochlorothiazide 12.5 mg daily, amlodipine 5 mg daily.  Patient reports compliance with medications without side effect.  Patient reports blood pressure 95/55 in the morning and  155/95 in the evenings He takes his BP meds in the morning and evening and checks his BP before taking his meds both times. Patient denies any headache, vision changes, chest pain, shortness of breath, syncope or presyncopal episodes at this time.    CKD   Stable per last BMP. Most recent creatinine was 1.7 in June unchanged from previous one in 12/21.     HLD/CAD   Patient has a distant history of myocardial infarction.  He is on Lipitor 40 mg.  He follows with cardiologist, Dr. Lucio. Patient denies dizziness, chest pain or shortness of breath, hear palpitation or uncomfortable skip beats.    ROS negative unless otherwise noted    Current Outpatient Medications on File Prior to Visit   Medication Sig Dispense Refill    amLODIPine (NORVASC) 5 MG tablet Take 1 tablet by mouth every morning (Patient taking differently: Take 5 mg by mouth every evening) 90 tablet 5    atorvastatin (LIPITOR) 40 MG tablet Take 1 tablet by mouth daily 90 tablet 3    benazepril (LOTENSIN) 20 MG tablet Take 1 tablet by mouth in the morning and 1 tablet in the evening. 180 tablet 3    hydroCHLOROthiazide (MICROZIDE) 12.5 MG capsule Take 1 capsule by mouth every morning 90 capsule 3    isosorbide mononitrate (IMDUR) 60 MG  extended release tablet Take 1 tablet by mouth daily 90 tablet 3    metoprolol (LOPRESSOR) 100 MG tablet Take 1 tablet by mouth 2 times daily 180 tablet 3    nitroGLYCERIN (NITROSTAT) 0.4 MG SL tablet Place 1 tablet under the tongue every 5 minutes as needed for Chest pain up to max of 3 total doses. If no relief after 1 dose, call 911. 25 tablet 3    Multiple Vitamin (MULTI-VITAMIN DAILY PO) Take by mouth daily      aspirin 81 MG tablet Take 81 mg by mouth daily       No current facility-administered medications on file prior to visit. Vitals:  Blood pressure 112/69, pulse 70, temperature 97.5 °F (36.4 °C), temperature source Temporal, weight 161 lb (73 kg), SpO2 96 %. Wt Readings from Last 3 Encounters:   01/23/23 161 lb (73 kg)   09/16/22 159 lb 2 oz (72.2 kg)   07/20/22 159 lb (72.1 kg)       PE:  Constitutional - alert, well appearing, and in no distress  Eyes - extraocular eye movements intact, left eye normal, right eye normal, no conjunctivitis noted  Neck - symmetric, no obvious masses noted  Respiratory- clear to auscultation, no wheezes, rales or rhonchi, symmetric air entry; no increased work of breathing  Cardiovascular - normal rate, regular rhythm, normal S1, S2, Holosystolic murmur noted, no rubs, clicks or gallops  Extremities - no edema noted  Abdomen - soft, nontender, nondistended  Skin - normal coloration and turgor, no rashes, no suspicious skin lesions noted  Neurological - no obvious CN deficits or focal neurological deficits  Psychiatric - alert, oriented; normal mood, behavior, speech, dress      A / P:  Anay Toledo was seen today for established new doctor.     Diagnoses and all orders for this visit:    Essential hypertension      -    Stable, continue current management      -    Orthostatics checked, 138/68 dropping to 112/69 standing, but does not cause dizziness      -    Patient was provided safety tips for when changing positions abruptly    Stage 3a chronic kidney disease ([facility] [address] -    Stable, continue current management      -    BMP today    Coronary artery disease involving native coronary artery of native heart without angina pectoris      -    Sees his cardiologist, Dr. Himanshu Segal, regularly      -    Stable, continue current management    RTO: Return in about 3 months (around 4/23/2023) for F/U on chronic conditions.     This case was discussed with attending physician: Dr. Adia Tomlinson    An electronic signature was used to authenticate this note.  ---- Guerrero Howe MD on 1/25/2023 at 9:46 PM

## 2023-01-23 NOTE — PROGRESS NOTES
S: 80 y.o. male presents today for Follow-up and Hypertension      HTN: on amlodipine; hctz; benazepril; lopressor;  ambulatory readings - am systolic 85/11; afternoon 155/85; takes half meds am and half pm  HLD: on lipitor 40mg + ASA  CAD: on lipitor 40mg; imdur, lopressor; follows with Dr. Jordin Greenwood      O: VS: /69 (Site: Right Upper Arm, Position: Standing, Cuff Size: Medium Adult) Comment (Position): no dizziness from seated to standing  Pulse 70   Temp 97.5 °F (36.4 °C) (Temporal)   Wt 161 lb (73 kg)   SpO2 96%   BMI 25.99 kg/m²   AAO/NAD, appropriate affect for mood  CV:  RRR, +systolic murmur  Resp: CTAB  Abdomen: SNTND  Ext; No edema  Orthostatics positive    Assessment/Plan:   1) HTN - stable, cpm; educated on safe position changes considering ortho hypotension  2) HLD - stable, cpm  3) CAD - per Dr. Jordin Greenwood; stable, cpm  4) CKD - repeat labs today   5) orthostatic hypotension; educate on importance of proper position changes; if he becomes symptomatic to call office; consider permissive htn pending future bp  RTO: Return in about 3 months (around 4/23/2023) for F/U on chronic conditions. Attending Physician Statement  I have discussed the case, including pertinent history and exam findings with the resident. I agree with the documented assessment and plan.       Electronically signed by Bernard Clarke MD on 1/26/2023 at 8:05 AM abnormal

## 2023-02-12 ENCOUNTER — HOSPITAL ENCOUNTER (EMERGENCY)
Age: 88
Discharge: HOME | End: 2023-02-12
Payer: MEDICARE

## 2023-02-12 VITALS
RESPIRATION RATE: 16 BRPM | WEIGHT: 155 LBS | SYSTOLIC BLOOD PRESSURE: 131 MMHG | HEART RATE: 80 BPM | BODY MASS INDEX: 25.02 KG/M2 | DIASTOLIC BLOOD PRESSURE: 66 MMHG | TEMPERATURE: 98.4 F | OXYGEN SATURATION: 98 %

## 2023-02-12 DIAGNOSIS — J01.10 ACUTE FRONTAL SINUSITIS, RECURRENCE NOT SPECIFIED: Primary | ICD-10-CM

## 2023-02-12 PROCEDURE — 99211 OFF/OP EST MAY X REQ PHY/QHP: CPT

## 2023-02-12 RX ORDER — AMOXICILLIN 500 MG/1
500 CAPSULE ORAL 3 TIMES DAILY
Qty: 21 CAPSULE | Refills: 0 | Status: SHIPPED | OUTPATIENT
Start: 2023-02-12 | End: 2023-02-19

## 2023-02-12 ASSESSMENT — PAIN - FUNCTIONAL ASSESSMENT: PAIN_FUNCTIONAL_ASSESSMENT: NONE - DENIES PAIN

## 2023-02-12 NOTE — ED PROVIDER NOTES
HPI: Martinez Winston is a 80 y.o. male with a past medical history of  has a past medical history of Arthritis, CAD (coronary artery disease), CKD (chronic kidney disease) stage 3, GFR 30-59 ml/min (HCC), GERD (gastroesophageal reflux disease), Hyperlipidemia, Hypertension, Myocardial infarct, old, Renal insufficiency, and Valvular heart disease. presenting with complaints of a cough with nasal congestion. The patient states that these symptoms began gradually. The history is obtained from the patient. The patient states that he has had some subjective chills at home. Patient does complain of a mild cough associated with it that is nonproductive. Patient denies excessive fatigue or sleeping greater than 18 hours a day. Patient denies exposure to mononucleosis. The patient denies any abdominal pain, left upper quadrant fullness, or early satiety. The patient also denies difficulty breathing, hemoptysis, neck pain/stiffness, or blurry vision. Sx have persisted and are mildly worse which is what prompted the visit today. unknown exposure to sick contacts, patient presents with complaints of nasal congestion, sinus pressure with pressure in the bilateral ears postnasal drip and nasal drainage which began 3 days ago. Denies any chest pain or shortness of breath. Denies any body aches or fevers. ROS:   Pertinent positives and negatives are stated within HPI, all other systems reviewed and are negative.      --------------------------------------------- PAST HISTORY ---------------------------------------------  Past Medical History:  has a past medical history of Arthritis, CAD (coronary artery disease), CKD (chronic kidney disease) stage 3, GFR 30-59 ml/min (HCC), GERD (gastroesophageal reflux disease), Hyperlipidemia, Hypertension, Myocardial infarct, old, Renal insufficiency, and Valvular heart disease. Past Surgical History:  has a past surgical history that includes Appendectomy (1960);  Coronary angioplasty with stent (Left, 1999); Cardiac catheterization (10/2006); Cardiac catheterization (11/20/2011); Cardiac catheterization (02/05/2016); Colonoscopy (02/12/2016); Colonoscopy (1990); Colonoscopy (2000); Colonoscopy (1985); Colonoscopy (03/04/2022); and Colonoscopy (N/A, 3/4/2022). Social History:  reports that he has never smoked. He has never used smokeless tobacco. He reports that he does not currently use alcohol. He reports that he does not use drugs. Family History: family history includes Cancer in his mother; Cirrhosis in his father; High Blood Pressure in his mother; High Cholesterol in his mother. The patients home medications have been reviewed. Allergies: Patient has no known allergies. ------------------------- NURSING NOTES AND VITALS REVIEWED ---------------------------   The nursing notes within the ED encounter and vital signs as below have been reviewed by myself. /66   Pulse 80   Temp 98.4 °F (36.9 °C)   Resp 16   Wt 155 lb (70.3 kg)   SpO2 98%   BMI 25.02 kg/m²   Oxygen Saturation Interpretation: Normal    The patients available past medical records and past encounters were reviewed. Physical exam:  Constitutional: Vital signs are reviewed the patient is comfortable. The patient is alert and oriented and conversant. Head: The head is atraumatic and normocephalic. Eyes: No discharge is present from the eyes. The sclera are normal.  ENT: The oropharynx demonstrates a small amount of erythema bilaterally. There is no tonsillar enlargement nor is there any exudate present. No uvular deviation or edema. No tonsillary asymmetry. Floor of the mouth soft, no trismus, handling secretions. TMs bilaterally demonstrate no evidence of infection. Tenderness on palpation to the bilateral frontal and maxillary sinuses. Nasal turbinates are inflamed and erythematous. Neck: Normal range of motion is achieved in the neck. There is no JVD present.  No meningeal signs are present   Anterior cervical adenopathy is normal.  Respiratory/chest: The chest is nontender. Breath sounds are normal. There is no respiratory distress. Cardiovascular: Heart shows a regular rate and rhythm without murmurs clicks or gallops. Abdominal exam: The abdomen is non tender without evidence of peritoneal signs. Specific attention to the left upper quadrant with palpation of the spleen demonstrates no organomegaly or tenderness  Skin: warm and dry, without rash  Neurologic: GCS 15   Psych: Normal Affect  -------------------------------------------------- RESULTS -------------------------------------------------    LABS:  No results found for this visit on 02/12/23. RADIOLOGY:  Interpreted by Radiologist.  No orders to display         ------------------------------ ED COURSE/MEDICAL DECISION MAKING----------------------  Medications - No data to display          Medical Decision Making:        patient presents with complaints of nasal congestion, sinus pressure with pressure in the bilateral ears postnasal drip and nasal drainage which began 3 days ago. Denies any chest pain or shortness of breath. Denies any body aches or fevers. We treated for a sinusitis advised to follow-up with PCP if any change or worsening symptoms. Not hypoxic, nothing to suggests pneumonia. Well appearing, non toxic, appropriate for outpatient management. Plan is for symptom management and PCP follow up. This patient's ED course included: a personal history and physicial eaxmination and re-evaluation prior to disposition    This patient has remained hemodynamically stable during their ED course. Counseling: The emergency provider has spoken with the patient and discussed todays results, in addition to providing specific details for the plan of care and counseling regarding the diagnosis and prognosis. Questions are answered at this time and they are agreeable with the plan. --------------------------------- IMPRESSION AND DISPOSITION ---------------------------------    IMPRESSION  1.  Acute frontal sinusitis, recurrence not specified        DISPOSITION  Disposition: Discharge to home  Patient condition is good              GRISELDA Preciado - CNP  02/12/23 1259

## 2023-04-14 ENCOUNTER — OFFICE VISIT (OUTPATIENT)
Dept: FAMILY MEDICINE CLINIC | Age: 88
End: 2023-04-14

## 2023-04-14 VITALS
OXYGEN SATURATION: 95 % | HEIGHT: 67 IN | WEIGHT: 159.8 LBS | RESPIRATION RATE: 16 BRPM | TEMPERATURE: 97.1 F | DIASTOLIC BLOOD PRESSURE: 78 MMHG | HEART RATE: 61 BPM | SYSTOLIC BLOOD PRESSURE: 130 MMHG | BODY MASS INDEX: 25.08 KG/M2

## 2023-04-14 DIAGNOSIS — I10 ESSENTIAL HYPERTENSION: Chronic | ICD-10-CM

## 2023-04-14 DIAGNOSIS — N18.30 STAGE 3 CHRONIC KIDNEY DISEASE, UNSPECIFIED WHETHER STAGE 3A OR 3B CKD (HCC): ICD-10-CM

## 2023-04-14 DIAGNOSIS — E78.2 MIXED HYPERLIPIDEMIA: Primary | ICD-10-CM

## 2023-04-14 DIAGNOSIS — I25.10 CORONARY ARTERY DISEASE INVOLVING NATIVE CORONARY ARTERY OF NATIVE HEART WITHOUT ANGINA PECTORIS: Chronic | ICD-10-CM

## 2023-04-18 ENCOUNTER — TELEPHONE (OUTPATIENT)
Dept: ADMINISTRATIVE | Age: 88
End: 2023-04-18

## 2023-07-13 ENCOUNTER — OFFICE VISIT (OUTPATIENT)
Dept: CARDIOLOGY CLINIC | Age: 88
End: 2023-07-13
Payer: MEDICARE

## 2023-07-13 VITALS
WEIGHT: 158 LBS | HEIGHT: 67 IN | RESPIRATION RATE: 18 BRPM | HEART RATE: 64 BPM | SYSTOLIC BLOOD PRESSURE: 122 MMHG | DIASTOLIC BLOOD PRESSURE: 64 MMHG | BODY MASS INDEX: 24.8 KG/M2 | OXYGEN SATURATION: 97 %

## 2023-07-13 DIAGNOSIS — N18.31 STAGE 3A CHRONIC KIDNEY DISEASE (HCC): Chronic | ICD-10-CM

## 2023-07-13 DIAGNOSIS — I10 ESSENTIAL HYPERTENSION: Chronic | ICD-10-CM

## 2023-07-13 DIAGNOSIS — E78.2 MIXED HYPERLIPIDEMIA: Chronic | ICD-10-CM

## 2023-07-13 DIAGNOSIS — I44.0 FIRST-DEGREE ATRIOVENTRICULAR BLOCK: ICD-10-CM

## 2023-07-13 DIAGNOSIS — I25.10 CORONARY ARTERY DISEASE INVOLVING NATIVE CORONARY ARTERY OF NATIVE HEART WITHOUT ANGINA PECTORIS: Primary | Chronic | ICD-10-CM

## 2023-07-13 PROCEDURE — 93000 ELECTROCARDIOGRAM COMPLETE: CPT | Performed by: INTERNAL MEDICINE

## 2023-07-13 PROCEDURE — 99215 OFFICE O/P EST HI 40 MIN: CPT | Performed by: INTERNAL MEDICINE

## 2023-07-13 PROCEDURE — 1123F ACP DISCUSS/DSCN MKR DOCD: CPT | Performed by: INTERNAL MEDICINE

## 2023-07-13 NOTE — PROGRESS NOTES
of recommendation of careful monitoring of medication administration in the face of his concomitant chronic kidney disease. Continued aggressive risk factor modification of blood pressure and serum lipids I do with goals of maintaining LDL cholesterol is below 70 mg pressor will remain essential to reducing risk of future atherosclerotic development. I presently plan his clinical reassessment in approximate 1 year and would happily reevaluate him in the interim should additional cardiovascular difficulties or concerns arise. The patient's current medication list, allergies, problem list and results of all previously ordered testing were reviewed at today's visit. Follow-up office visit in 1 year      Note: This report was completed using computerized voice recognition software. Every effort has been made to ensure accuracy, however; inadvertent computerized transcription errors may be present. May Ordonez.  Cassidy Mora, [address] [facility]    An electronic copy of this follow-up progress note was forwarded to Dr. Fay Fischer

## 2023-07-17 ENCOUNTER — OFFICE VISIT (OUTPATIENT)
Dept: FAMILY MEDICINE CLINIC | Age: 88
End: 2023-07-17
Payer: MEDICARE

## 2023-07-17 VITALS
SYSTOLIC BLOOD PRESSURE: 133 MMHG | HEIGHT: 67 IN | BODY MASS INDEX: 24.64 KG/M2 | TEMPERATURE: 97.9 F | DIASTOLIC BLOOD PRESSURE: 65 MMHG | RESPIRATION RATE: 18 BRPM | WEIGHT: 157 LBS | HEART RATE: 58 BPM | OXYGEN SATURATION: 97 %

## 2023-07-17 DIAGNOSIS — Z00.00 MEDICARE ANNUAL WELLNESS VISIT, SUBSEQUENT: Primary | ICD-10-CM

## 2023-07-17 DIAGNOSIS — I10 ESSENTIAL HYPERTENSION: Chronic | ICD-10-CM

## 2023-07-17 LAB
ANION GAP SERPL CALCULATED.3IONS-SCNC: 9 MMOL/L (ref 7–16)
BUN BLDV-MCNC: 36 MG/DL (ref 6–23)
CALCIUM SERPL-MCNC: 9.8 MG/DL (ref 8.6–10.2)
CHLORIDE BLD-SCNC: 107 MMOL/L (ref 98–107)
CO2: 27 MMOL/L (ref 22–29)
CREAT SERPL-MCNC: 1.7 MG/DL (ref 0.7–1.2)
GFR SERPL CREATININE-BSD FRML MDRD: 38 ML/MIN/1.73M2
GLUCOSE BLD-MCNC: 104 MG/DL (ref 74–99)
POTASSIUM SERPL-SCNC: 5.1 MMOL/L (ref 3.5–5)
SODIUM BLD-SCNC: 143 MMOL/L (ref 132–146)

## 2023-07-17 PROCEDURE — 1123F ACP DISCUSS/DSCN MKR DOCD: CPT | Performed by: STUDENT IN AN ORGANIZED HEALTH CARE EDUCATION/TRAINING PROGRAM

## 2023-07-17 PROCEDURE — G0439 PPPS, SUBSEQ VISIT: HCPCS | Performed by: STUDENT IN AN ORGANIZED HEALTH CARE EDUCATION/TRAINING PROGRAM

## 2023-07-17 PROCEDURE — 36415 COLL VENOUS BLD VENIPUNCTURE: CPT | Performed by: FAMILY MEDICINE

## 2023-07-17 ASSESSMENT — PATIENT HEALTH QUESTIONNAIRE - PHQ9
SUM OF ALL RESPONSES TO PHQ QUESTIONS 1-9: 0
SUM OF ALL RESPONSES TO PHQ QUESTIONS 1-9: 0
2. FEELING DOWN, DEPRESSED OR HOPELESS: 0
SUM OF ALL RESPONSES TO PHQ9 QUESTIONS 1 & 2: 0
SUM OF ALL RESPONSES TO PHQ QUESTIONS 1-9: 0
1. LITTLE INTEREST OR PLEASURE IN DOING THINGS: 0
SUM OF ALL RESPONSES TO PHQ QUESTIONS 1-9: 0

## 2023-07-17 ASSESSMENT — LIFESTYLE VARIABLES
HOW MANY STANDARD DRINKS CONTAINING ALCOHOL DO YOU HAVE ON A TYPICAL DAY: PATIENT DOES NOT DRINK
HOW OFTEN DO YOU HAVE A DRINK CONTAINING ALCOHOL: NEVER

## 2023-10-20 ENCOUNTER — OFFICE VISIT (OUTPATIENT)
Dept: FAMILY MEDICINE CLINIC | Age: 88
End: 2023-10-20

## 2023-10-20 VITALS
SYSTOLIC BLOOD PRESSURE: 119 MMHG | RESPIRATION RATE: 18 BRPM | TEMPERATURE: 97.5 F | HEIGHT: 67 IN | WEIGHT: 155.5 LBS | DIASTOLIC BLOOD PRESSURE: 73 MMHG | BODY MASS INDEX: 24.4 KG/M2 | HEART RATE: 67 BPM | OXYGEN SATURATION: 96 %

## 2023-10-20 DIAGNOSIS — E78.2 MIXED HYPERLIPIDEMIA: Chronic | ICD-10-CM

## 2023-10-20 DIAGNOSIS — Z23 FLU VACCINE NEED: ICD-10-CM

## 2023-10-20 DIAGNOSIS — I10 ESSENTIAL HYPERTENSION: Primary | Chronic | ICD-10-CM

## 2023-10-20 DIAGNOSIS — I25.10 CORONARY ARTERY DISEASE INVOLVING NATIVE HEART WITHOUT ANGINA PECTORIS, UNSPECIFIED VESSEL OR LESION TYPE: ICD-10-CM

## 2023-10-20 PROBLEM — H35.3130 BILATERAL NONEXUDATIVE AGE-RELATED MACULAR DEGENERATION: Status: ACTIVE | Noted: 2022-10-31

## 2023-10-20 LAB
ANION GAP SERPL CALCULATED.3IONS-SCNC: 17 MMOL/L (ref 7–16)
BUN BLDV-MCNC: 32 MG/DL (ref 6–23)
CALCIUM SERPL-MCNC: 9.2 MG/DL (ref 8.6–10.2)
CHLORIDE BLD-SCNC: 101 MMOL/L (ref 98–107)
CHOLESTEROL: 155 MG/DL
CO2: 20 MMOL/L (ref 22–29)
CREAT SERPL-MCNC: 1.5 MG/DL (ref 0.7–1.2)
GFR SERPL CREATININE-BSD FRML MDRD: 44 ML/MIN/1.73M2
GLUCOSE BLD-MCNC: 129 MG/DL (ref 74–99)
HDLC SERPL-MCNC: 47 MG/DL
LDL CHOLESTEROL: 64 MG/DL
POTASSIUM SERPL-SCNC: 4.4 MMOL/L (ref 3.5–5)
SODIUM BLD-SCNC: 138 MMOL/L (ref 132–146)
TRIGL SERPL-MCNC: 220 MG/DL
VLDLC SERPL CALC-MCNC: 44 MG/DL

## 2023-10-20 RX ORDER — ATORVASTATIN CALCIUM 40 MG/1
40 TABLET, FILM COATED ORAL DAILY
Qty: 90 TABLET | Refills: 3 | Status: SHIPPED | OUTPATIENT
Start: 2023-10-20

## 2023-10-20 RX ORDER — BENAZEPRIL HYDROCHLORIDE 20 MG/1
20 TABLET ORAL 2 TIMES DAILY
Qty: 180 TABLET | Refills: 3 | Status: SHIPPED | OUTPATIENT
Start: 2023-10-20

## 2023-10-20 RX ORDER — METOPROLOL TARTRATE 100 MG/1
100 TABLET ORAL 2 TIMES DAILY
Qty: 180 TABLET | Refills: 3 | Status: SHIPPED | OUTPATIENT
Start: 2023-10-20

## 2023-10-20 RX ORDER — HYDROCHLOROTHIAZIDE 12.5 MG/1
12.5 CAPSULE, GELATIN COATED ORAL EVERY MORNING
Qty: 90 CAPSULE | Refills: 3 | Status: SHIPPED | OUTPATIENT
Start: 2023-10-20

## 2023-10-20 RX ORDER — ASPIRIN 81 MG/1
81 TABLET ORAL DAILY
Qty: 30 TABLET | Refills: 5 | Status: SHIPPED | OUTPATIENT
Start: 2023-10-20

## 2023-10-20 RX ORDER — ISOSORBIDE MONONITRATE 60 MG/1
60 TABLET, EXTENDED RELEASE ORAL DAILY
Qty: 90 TABLET | Refills: 3 | Status: SHIPPED | OUTPATIENT
Start: 2023-10-20

## 2023-10-20 RX ORDER — MULTIVITAMIN
1 TABLET ORAL DAILY
Qty: 30 TABLET | Refills: 3 | Status: SHIPPED | OUTPATIENT
Start: 2023-10-20

## 2023-10-20 RX ORDER — AMLODIPINE BESYLATE 5 MG/1
5 TABLET ORAL EVERY EVENING
Qty: 30 TABLET | Refills: 2 | Status: SHIPPED | OUTPATIENT
Start: 2023-10-20

## 2023-10-20 SDOH — ECONOMIC STABILITY: HOUSING INSECURITY
IN THE LAST 12 MONTHS, WAS THERE A TIME WHEN YOU DID NOT HAVE A STEADY PLACE TO SLEEP OR SLEPT IN A SHELTER (INCLUDING NOW)?: NO

## 2023-10-20 SDOH — ECONOMIC STABILITY: FOOD INSECURITY: WITHIN THE PAST 12 MONTHS, THE FOOD YOU BOUGHT JUST DIDN'T LAST AND YOU DIDN'T HAVE MONEY TO GET MORE.: NEVER TRUE

## 2023-10-20 SDOH — ECONOMIC STABILITY: FOOD INSECURITY: WITHIN THE PAST 12 MONTHS, YOU WORRIED THAT YOUR FOOD WOULD RUN OUT BEFORE YOU GOT MONEY TO BUY MORE.: NEVER TRUE

## 2023-10-20 SDOH — ECONOMIC STABILITY: INCOME INSECURITY: HOW HARD IS IT FOR YOU TO PAY FOR THE VERY BASICS LIKE FOOD, HOUSING, MEDICAL CARE, AND HEATING?: NOT HARD AT ALL

## 2023-10-21 PROBLEM — I25.10 CORONARY ARTERY DISEASE INVOLVING NATIVE HEART WITHOUT ANGINA PECTORIS: Status: ACTIVE | Noted: 2017-05-15

## 2023-11-01 DIAGNOSIS — I10 ESSENTIAL HYPERTENSION: Chronic | ICD-10-CM

## 2023-11-01 RX ORDER — AMLODIPINE BESYLATE 5 MG/1
5 TABLET ORAL EVERY EVENING
Qty: 90 TABLET | Refills: 3 | Status: SHIPPED | OUTPATIENT
Start: 2023-11-01

## 2023-11-01 NOTE — TELEPHONE ENCOUNTER
Patient needs 90 day supply for insurance purpose Screening Medical Evaluation  Patient Admitted from: [facility] admitting diagnosis: Recurrent MDD    PAST MEDICAL & SURGICAL HISTORY:  Cataracts, bilateral  Anxiety  Chronic leukemia  Prostate cancer  Hernia, inguinal, right  Depression  Anxiety  Depression  CLL (chronic lymphocytic leukemia)  Hydrocele  Varicocele  Cataract  Umbilical hernia  Prostate cancer  Status post cataract extraction, unspecified laterality  No significant past surgical history  No significant past surgical history        Allergies    latex (Rash)  latex (Unknown)  lidocaine (Hypotension)  lidocaine (Unknown)    Intolerances        Social History:     FAMILY HISTORY:  No pertinent family history in first degree relatives      MEDICATIONS  (STANDING):  risperiDONE   Tablet 0.5milliGRAM(s) Oral two times a day  finasteride 5milliGRAM(s) Oral daily  tamsulosin 0.4milliGRAM(s) Oral at bedtime  gabapentin 300milliGRAM(s) Oral three times a day    MEDICATIONS  (PRN):  LORazepam     Tablet 0.5milliGRAM(s) Oral every 6 hours PRN Anxiety  LORazepam   Injectable 0.5milliGRAM(s) IntraMuscular once PRN Agitation      Vital Signs Last 24 Hrs  T(C): 36.9, Max: 36.9 (05-26 @ 15:27)  HR: --  sitting 72, standing 87  BP: --   sitting 130/60, standing 121/60  RR: --  SpO2: --  Wt(kg): --  CAPILLARY BLOOD GLUCOSE    I&O's Summary      PHYSICAL EXAM:  GENERAL: NAD, well-developed  HEAD:  Atraumatic, Normocephalic  EYES: EOMI, PERRLA, conjunctiva and sclera clear  NECK: Supple, No JVD  CHEST/LUNG: Clear to auscultation bilaterally; No wheeze  HEART: Regular rate and rhythm; No murmurs, rubs, or gallops  ABDOMEN: Soft, Nontender, Nondistended; Bowel sounds present  EXTREMITIES:  2+ Peripheral Pulses, No clubbing, cyanosis, or edema  PSYCH: AAOx3  NEUROLOGY: non-focal  SKIN: No rashes or lesions    LABS:                    RADIOLOGY & ADDITIONAL TESTS:  5/27 AM - CBC, CMP, TSH, Lipid, Toxicology screen drug of abuse, urine, urinalysis    Assessment and Plan: Screening Medical Evaluation  Patient Admitted from: [facility] admitting diagnosis: Recurrent MDD    PAST MEDICAL & SURGICAL HISTORY:  Cataracts, bilateral  Anxiety  Chronic leukemia  Prostate cancer  Hernia, inguinal, right  Depression  Anxiety  Depression  CLL (chronic lymphocytic leukemia)  Hydrocele  Varicocele  Cataract  Umbilical hernia  Prostate cancer  Status post cataract extraction, unspecified laterality  No significant past surgical history  No significant past surgical history        Allergies    latex (Rash)  latex (Unknown)  lidocaine (Hypotension)  lidocaine (Unknown)    Intolerances        Social History: quit smoking in March. (had one to two glasses of wine nightly before March)    FAMILY HISTORY:  No pertinent family history in first degree relatives      MEDICATIONS  (STANDING):  risperiDONE   Tablet 0.5milliGRAM(s) Oral two times a day  finasteride 5milliGRAM(s) Oral daily  tamsulosin 0.4milliGRAM(s) Oral at bedtime  gabapentin 300milliGRAM(s) Oral three times a day    MEDICATIONS  (PRN):  LORazepam     Tablet 0.5milliGRAM(s) Oral every 6 hours PRN Anxiety  LORazepam   Injectable 0.5milliGRAM(s) IntraMuscular once PRN Agitation      Vital Signs Last 24 Hrs  T(C): 36.9, Max: 36.9 (05-26 @ 15:27)  HR: --  sitting 72, standing 87  BP: --   sitting 130/60, standing 121/60  RR: --   16  SpO2: --  Wt(kg): --  CAPILLARY BLOOD GLUCOSE    I&O's Summary      PHYSICAL EXAM:  GENERAL: NAD, well-developed  HEAD:  Atraumatic, Normocephalic  EYES: EOMI, PERRLA, conjunctiva and sclera clear  NECK: Supple, No JVD  CHEST/LUNG: Clear to auscultation bilaterally; No wheeze  HEART: Regular rate and rhythm; No murmurs, rubs, or gallops  ABDOMEN: Soft, Nontender, Nondistended; Bowel sounds present  EXTREMITIES:  2+ Peripheral Pulses, No clubbing, cyanosis, or edema  PSYCH: AAOx3  NEUROLOGY: non-focal  SKIN: No rashes or lesions    LABS:        RADIOLOGY & ADDITIONAL TESTS:  5/27 AM - CBC, CMP, TSH, Lipid, Toxicology screen drug of abuse, urine, urinalysis    Assessment and Plan:  76 year old male admitted to [facility] from [facility] for depression.      Depression - continue with psych recommendations    BPH  - continue with flomax     Neuropathy - continue with neurontin    HTN - continue with proscar

## 2023-11-09 DIAGNOSIS — I10 ESSENTIAL HYPERTENSION: Chronic | ICD-10-CM

## 2023-11-09 RX ORDER — AMLODIPINE BESYLATE 5 MG/1
5 TABLET ORAL EVERY EVENING
Qty: 90 TABLET | Refills: 3 | Status: SHIPPED | OUTPATIENT
Start: 2023-11-09

## 2024-01-22 ENCOUNTER — OFFICE VISIT (OUTPATIENT)
Dept: FAMILY MEDICINE CLINIC | Age: 89
End: 2024-01-22
Payer: MEDICARE

## 2024-01-22 VITALS
HEART RATE: 64 BPM | BODY MASS INDEX: 26.49 KG/M2 | SYSTOLIC BLOOD PRESSURE: 119 MMHG | OXYGEN SATURATION: 99 % | TEMPERATURE: 98.6 F | HEIGHT: 65 IN | DIASTOLIC BLOOD PRESSURE: 57 MMHG | WEIGHT: 159 LBS

## 2024-01-22 DIAGNOSIS — N18.31 STAGE 3A CHRONIC KIDNEY DISEASE (HCC): ICD-10-CM

## 2024-01-22 DIAGNOSIS — E78.2 MIXED HYPERLIPIDEMIA: Chronic | ICD-10-CM

## 2024-01-22 DIAGNOSIS — I10 ESSENTIAL HYPERTENSION: Primary | ICD-10-CM

## 2024-01-22 DIAGNOSIS — I25.10 CORONARY ARTERY DISEASE INVOLVING NATIVE HEART WITHOUT ANGINA PECTORIS, UNSPECIFIED VESSEL OR LESION TYPE: ICD-10-CM

## 2024-01-22 LAB
ANION GAP SERPL CALCULATED.3IONS-SCNC: 9 MMOL/L (ref 7–16)
BUN BLDV-MCNC: 55 MG/DL (ref 6–23)
CALCIUM SERPL-MCNC: 9.6 MG/DL (ref 8.6–10.2)
CHLORIDE BLD-SCNC: 104 MMOL/L (ref 98–107)
CO2: 27 MMOL/L (ref 22–29)
CREAT SERPL-MCNC: 1.9 MG/DL (ref 0.7–1.2)
GFR SERPL CREATININE-BSD FRML MDRD: 34 ML/MIN/1.73M2
GLUCOSE BLD-MCNC: 90 MG/DL (ref 74–99)
POTASSIUM SERPL-SCNC: 5.2 MMOL/L (ref 3.5–5)
SODIUM BLD-SCNC: 140 MMOL/L (ref 132–146)

## 2024-01-22 PROCEDURE — 36415 COLL VENOUS BLD VENIPUNCTURE: CPT | Performed by: FAMILY MEDICINE

## 2024-01-22 PROCEDURE — 99213 OFFICE O/P EST LOW 20 MIN: CPT | Performed by: STUDENT IN AN ORGANIZED HEALTH CARE EDUCATION/TRAINING PROGRAM

## 2024-01-22 PROCEDURE — 1123F ACP DISCUSS/DSCN MKR DOCD: CPT | Performed by: STUDENT IN AN ORGANIZED HEALTH CARE EDUCATION/TRAINING PROGRAM

## 2024-01-22 ASSESSMENT — PATIENT HEALTH QUESTIONNAIRE - PHQ9
SUM OF ALL RESPONSES TO PHQ QUESTIONS 1-9: 0
2. FEELING DOWN, DEPRESSED OR HOPELESS: 0
SUM OF ALL RESPONSES TO PHQ9 QUESTIONS 1 & 2: 0
SUM OF ALL RESPONSES TO PHQ QUESTIONS 1-9: 0
1. LITTLE INTEREST OR PLEASURE IN DOING THINGS: 0

## 2024-01-22 NOTE — PROGRESS NOTES
CC: Jonah Reynolds is a 88 y.o. yo male is here for evaluation evaluation for the following chronic medical concerns: Hypertension (3 month f/u)  . He has a past medical history of Arthritis, CAD (coronary artery disease), CKD (chronic kidney disease) stage 3, GFR 30-59 ml/min ([facility]), GERD (gastroesophageal reflux disease), Hyperlipidemia, Hypertension, Myocardial infarct, old, Renal insufficiency, and Valvular heart disease.      HPI:    Hypertension:  Current medications include benazepril 20 mg twice daily, metoprolol 100 mg twice daily, hydrochlorothiazide 12.5 mg daily, amlodipine 5 mg daily.  Patient reports compliance with medications without side effect.  Patient reports blood pressure 105/60 in the morning and 135/65 in the evenings He takes his BP meds in the morning and evening and checks his BP before taking his meds both times. Patient denies any headache, vision changes, chest pain, shortness of breath, syncope or presyncopal episodes at this time.    HLD/CAD   Patient has a distant history of myocardial infarction.  He is on Lipitor 40 mg.  He follows with cardiologist, Dr. Lucio. Patient denies dizziness, chest pain or shortness of breath, hear palpitation or uncomfortable skip beats.    ROS negative unless otherwise noted    Current Outpatient Medications on File Prior to Visit   Medication Sig Dispense Refill    amLODIPine (NORVASC) 5 MG tablet Take 1 tablet by mouth every evening 90 tablet 3    atorvastatin (LIPITOR) 40 MG tablet Take 1 tablet by mouth daily 90 tablet 3    hydroCHLOROthiazide (MICROZIDE) 12.5 MG capsule Take 1 capsule by mouth every morning 90 capsule 3    benazepril (LOTENSIN) 20 MG tablet Take 1 tablet by mouth in the morning and 1 tablet in the evening. 180 tablet 3    isosorbide mononitrate (IMDUR) 60 MG extended release tablet Take 1 tablet by mouth daily 90 tablet 3    metoprolol (LOPRESSOR) 100 MG tablet Take 1 tablet by mouth 2 times daily 180 tablet 3    Multiple

## 2024-01-22 NOTE — PROGRESS NOTES
S: 88 y.o. male presents today for Hypertension (3 month f/u)      HTN: lotensin 20mg BID; lopressor 100mg BID; norvasc 5mg; imdur 60mg; hctz 12.5mg; ambulatory readings wnl  HLD: on lipito 40mg  CAD: on lipitor 40mg, imdur 60mg; BB: follows with Dr. Lucio  CKD baseline creatining 1.5    O: VS: BP (!) 119/57   Pulse 64   Temp 98.6 °F (37 °C)   Ht 1.651 m (5' 5\")   Wt 72.1 kg (159 lb)   SpO2 99%   BMI 26.46 kg/m²   AAO/NAD, appropriate affect for mood  CV:  RRR, no murmur  Resp: CTAB  Abdomen: SNTND  Ext; no edema    Assessment/Plan:   1) HTN - stable, cpm  2) HLD - continue lipitor 40mg  3) CAD - stable, cpm  4) CKD - BMP today  5) HM as ordered  RTO: Return in about 3 months (around 4/22/2024) for AWV.      Attending Physician Statement  I have discussed the case, including pertinent history and exam findings with the resident.  I agree with the documented assessment and plan.      Electronically signed by Lyssa Harper MD on 1/22/2024 at 11:03 AM

## 2024-01-23 DIAGNOSIS — I10 ESSENTIAL HYPERTENSION: Primary | Chronic | ICD-10-CM

## 2024-01-23 RX ORDER — BENAZEPRIL HYDROCHLORIDE 10 MG/1
10 TABLET ORAL DAILY
Qty: 30 TABLET | Refills: 3 | Status: SHIPPED | OUTPATIENT
Start: 2024-01-23

## 2024-02-12 ENCOUNTER — HOSPITAL ENCOUNTER (EMERGENCY)
Age: 89
Discharge: HOME | End: 2024-02-12
Attending: STUDENT IN AN ORGANIZED HEALTH CARE EDUCATION/TRAINING PROGRAM
Payer: MEDICARE

## 2024-02-12 ENCOUNTER — APPOINTMENT (OUTPATIENT)
Dept: GENERAL RADIOLOGY | Age: 89
End: 2024-02-12
Payer: MEDICARE

## 2024-02-12 VITALS
WEIGHT: 155 LBS | TEMPERATURE: 97.4 F | HEART RATE: 60 BPM | SYSTOLIC BLOOD PRESSURE: 126 MMHG | DIASTOLIC BLOOD PRESSURE: 63 MMHG | BODY MASS INDEX: 25.79 KG/M2 | OXYGEN SATURATION: 94 % | RESPIRATION RATE: 20 BRPM

## 2024-02-12 DIAGNOSIS — M79.602 LEFT ARM PAIN: Primary | ICD-10-CM

## 2024-02-12 LAB
ALBUMIN SERPL-MCNC: 4.4 G/DL (ref 3.5–5.2)
ALP SERPL-CCNC: 109 U/L (ref 40–129)
ALT SERPL-CCNC: 29 U/L (ref 0–40)
ANION GAP SERPL CALCULATED.3IONS-SCNC: 11 MMOL/L (ref 7–16)
AST SERPL-CCNC: 27 U/L (ref 0–39)
BASOPHILS # BLD: 0.07 K/UL (ref 0–0.2)
BASOPHILS NFR BLD: 1 % (ref 0–2)
BILIRUB SERPL-MCNC: 0.6 MG/DL (ref 0–1.2)
BUN SERPL-MCNC: 38 MG/DL (ref 6–23)
CALCIUM SERPL-MCNC: 9.7 MG/DL (ref 8.6–10.2)
CHLORIDE SERPL-SCNC: 101 MMOL/L (ref 98–107)
CO2 SERPL-SCNC: 26 MMOL/L (ref 22–29)
CREAT SERPL-MCNC: 1.8 MG/DL (ref 0.7–1.2)
EKG ATRIAL RATE: 76 BPM
EKG P AXIS: 55 DEGREES
EKG P-R INTERVAL: 218 MS
EKG Q-T INTERVAL: 388 MS
EKG QRS DURATION: 98 MS
EKG QTC CALCULATION (BAZETT): 436 MS
EKG R AXIS: -29 DEGREES
EKG T AXIS: 68 DEGREES
EKG VENTRICULAR RATE: 76 BPM
EOSINOPHIL # BLD: 0.27 K/UL (ref 0.05–0.5)
EOSINOPHILS RELATIVE PERCENT: 3 % (ref 0–6)
ERYTHROCYTE [DISTWIDTH] IN BLOOD BY AUTOMATED COUNT: 12.4 % (ref 11.5–15)
GFR SERPL CREATININE-BSD FRML MDRD: 37 ML/MIN/1.73M2
GLUCOSE SERPL-MCNC: 91 MG/DL (ref 74–99)
HCT VFR BLD AUTO: 48.4 % (ref 37–54)
HGB BLD-MCNC: 16 G/DL (ref 12.5–16.5)
IMM GRANULOCYTES # BLD AUTO: 0.1 K/UL (ref 0–0.58)
IMM GRANULOCYTES NFR BLD: 1 % (ref 0–5)
LYMPHOCYTES NFR BLD: 1.5 K/UL (ref 1.5–4)
LYMPHOCYTES RELATIVE PERCENT: 15 % (ref 20–42)
MCH RBC QN AUTO: 31.3 PG (ref 26–35)
MCHC RBC AUTO-ENTMCNC: 33.1 G/DL (ref 32–34.5)
MCV RBC AUTO: 94.7 FL (ref 80–99.9)
MONOCYTES NFR BLD: 1.24 K/UL (ref 0.1–0.95)
MONOCYTES NFR BLD: 13 % (ref 2–12)
NEUTROPHILS NFR BLD: 67 % (ref 43–80)
NEUTS SEG NFR BLD: 6.59 K/UL (ref 1.8–7.3)
PLATELET # BLD AUTO: 223 K/UL (ref 130–450)
PMV BLD AUTO: 10.7 FL (ref 7–12)
POTASSIUM SERPL-SCNC: 4.5 MMOL/L (ref 3.5–5)
PROT SERPL-MCNC: 7.1 G/DL (ref 6.4–8.3)
RBC # BLD AUTO: 5.11 M/UL (ref 3.8–5.8)
SODIUM SERPL-SCNC: 138 MMOL/L (ref 132–146)
TROPONIN I SERPL HS-MCNC: 19 NG/L (ref 0–11)
TROPONIN I SERPL HS-MCNC: 22 NG/L (ref 0–11)
WBC OTHER # BLD: 9.8 K/UL (ref 4.5–11.5)

## 2024-02-12 PROCEDURE — 84484 ASSAY OF TROPONIN QUANT: CPT

## 2024-02-12 PROCEDURE — 73030 X-RAY EXAM OF SHOULDER: CPT

## 2024-02-12 PROCEDURE — 71045 X-RAY EXAM CHEST 1 VIEW: CPT

## 2024-02-12 PROCEDURE — 99285 EMERGENCY DEPT VISIT HI MDM: CPT

## 2024-02-12 PROCEDURE — 93010 ELECTROCARDIOGRAM REPORT: CPT | Performed by: INTERNAL MEDICINE

## 2024-02-12 PROCEDURE — 93005 ELECTROCARDIOGRAM TRACING: CPT | Performed by: PHYSICIAN ASSISTANT

## 2024-02-12 PROCEDURE — 80053 COMPREHEN METABOLIC PANEL: CPT

## 2024-02-12 PROCEDURE — 85025 COMPLETE CBC W/AUTO DIFF WBC: CPT

## 2024-02-12 NOTE — ED TRIAGE NOTES
Department of Emergency Medicine    FIRST PROVIDER TRIAGE NOTE             Independent MLP           2/12/24  9:31 AM EST    Date of Encounter: 2/12/24   MRN: [id number]    Vitals:    02/12/24 0923   Pulse: 82   Resp: 18   Temp: 97.4 °F (36.3 °C)   TempSrc: Oral   SpO2: 96%   Weight: 70.3 kg (155 lb)      HPI: Jonah Reynolds is a 89 y.o. male who presents to the ED for Arm Pain (Left arm pain that started a few days ago-dull, intermittent. Aaox4. )     Pt denies chest pain at this time    ROS: Negative for abd pain, vomiting, or diarrhea.    Physical Exam:   Gen Appearance/Constitutional: alert  CV: regular rate     Initial Plan of Care: All treatment areas with department are currently occupied.     Plan to order/Initiate the following while awaiting opening in ED: Triage evaluation  EKG.    Initial Plan of Care: Initiate Treatment-Testing, Proceed toTreatment Area When Bed Available for ED Attending/MLP to Continue Care    Electronically signed by Pita Ruiz PA-C   DD: 2/12/24

## 2024-02-12 NOTE — DISCHARGE INSTRUCTIONS
Follow-up with primary care doctor  Follow-up with cardiology  If you notice any new worrisome symptoms please return to emergency department for evaluation

## 2024-02-12 NOTE — ED NOTES
Patient a&ox4 and agreeable to discharge. Patient education provided and follow up encouraged. Skin pwd, rr even and unlabored, no s/sx of distress prior to discharge.

## 2024-02-13 ENCOUNTER — TELEPHONE (OUTPATIENT)
Dept: ADMINISTRATIVE | Age: 89
End: 2024-02-13

## 2024-02-13 NOTE — TELEPHONE ENCOUNTER
Went to [facility] ER yesterday- with left arm pain and would like a f/u with Dr Lucio. [phone]

## 2024-02-14 NOTE — ED PROVIDER NOTES
[facility] EMERGENCY DEPARTMENT  EMERGENCY DEPARTMENT ENCOUNTER      Pt Name: Jonah Reynolds  MRN: [id number]  Birthdate 1935  Date of evaluation: 2/12/2024  Provider: Jamel Mason DO  PCP: Omkar Albarado MD      CHIEF COMPLAINT       Chief Complaint   Patient presents with    Arm Pain     Left arm pain that started a few days ago-dull, intermittent. Aaox4.        HISTORY OF PRESENT ILLNESS: 1 or more Elements   History From: Patient  Limitations to history : None    Jonah Reynolds is a 89 y.o. male Past medical history of hyperlipidemia, hypertension, GERD as well as CKD and CAD.  Patient presents with chief complaint of left-sided arm pain.  Patient notes that he has had intermittent episodes of left-sided arm pain for the last week.  Patient scribes her pain as a dull aching sensation he currently rates pain a 4 out of 10.  Patient denies any exacerbating relieving factors.  He denies any significant falls or trauma.  Patient denies any donis chest pain.  Patient denies any fevers, chills, nausea, vomit, abdominal pain, constipation or diarrhea.    Nursing Notes were all reviewed and agreed with or any disagreements were addressed in the HPI.    REVIEW OF SYSTEMS :    Positives and Pertinent negatives as per HPI.     PAST MEDICAL HISTORY/Chronic Conditions Affecting Care    has a past medical history of Arthritis, CAD (coronary artery disease), CKD (chronic kidney disease) stage 3, GFR 30-59 ml/min ([facility]) (6/8/2020), GERD (gastroesophageal reflux disease) (2013), Hyperlipidemia, Hypertension, Myocardial infarct, old (1999), Renal insufficiency, and Valvular heart disease.     SURGICAL HISTORY     Past Surgical History:   Procedure Laterality Date    APPENDECTOMY  1960    CARDIAC CATHETERIZATION  10/2006    left main 20% ostial, LAD 20% prox, first diag 40% ostial, LCX 30%prox & 30%mid, RCA no significant disease    CARDIAC CATHETERIZATION  11/20/2011    patent stent

## 2024-02-27 ENCOUNTER — OFFICE VISIT (OUTPATIENT)
Dept: FAMILY MEDICINE CLINIC | Age: 89
End: 2024-02-27
Payer: MEDICARE

## 2024-02-27 VITALS
SYSTOLIC BLOOD PRESSURE: 111 MMHG | BODY MASS INDEX: 25.99 KG/M2 | WEIGHT: 156 LBS | OXYGEN SATURATION: 94 % | RESPIRATION RATE: 16 BRPM | TEMPERATURE: 96.9 F | DIASTOLIC BLOOD PRESSURE: 73 MMHG | HEART RATE: 66 BPM | HEIGHT: 65 IN

## 2024-02-27 DIAGNOSIS — I25.10 CORONARY ARTERY DISEASE INVOLVING NATIVE HEART WITHOUT ANGINA PECTORIS, UNSPECIFIED VESSEL OR LESION TYPE: ICD-10-CM

## 2024-02-27 PROCEDURE — 99213 OFFICE O/P EST LOW 20 MIN: CPT | Performed by: STUDENT IN AN ORGANIZED HEALTH CARE EDUCATION/TRAINING PROGRAM

## 2024-02-27 PROCEDURE — 1123F ACP DISCUSS/DSCN MKR DOCD: CPT | Performed by: STUDENT IN AN ORGANIZED HEALTH CARE EDUCATION/TRAINING PROGRAM

## 2024-02-27 RX ORDER — NITROGLYCERIN 0.4 MG/1
0.4 TABLET SUBLINGUAL EVERY 5 MIN PRN
Qty: 25 TABLET | Refills: 3 | Status: SHIPPED | OUTPATIENT
Start: 2024-02-27

## 2024-02-27 NOTE — PROGRESS NOTES
ED follow up:  Patient is here to follow up after going to the emergency room for left arm pain. Troponin/EKG were reassuring troponins are flat. Patient had no significant chest pain at this time. Patient has overall low low moderate risk heart score.  He is  doing better.  The problem has resolved at this time.  To summarize, patient was seen with complaints of right arm pain.  ED course and record reviewed.  Patient does not have any further complaints or concerns today. He'll see his cardiologist next month.    Blood pressure 111/73, pulse 66, temperature 96.9 °F (36.1 °C), resp. rate 16, height 1.651 m (5' 5\"), weight 70.8 kg (156 lb), SpO2 94 %.    HEENT WNL     Heart regular    Lungs clear    abd non-tender      No edema    Pulses intact       Assessment and plan  Right arm pain follow-up  - ED visit notes reviewed  - Patient to see cardiologist  - Follow-up as needed    Attending Physician Statement  I have discussed the case, including pertinent history and exam findings with the resident. I agree with the documented assessment and plan.

## 2024-02-27 NOTE — PROGRESS NOTES
CC: Jonah Reynolds is a 89 y.o. yo male is here for evaluation evaluation for the following  ED Encounter  medical concerns: Follow-Up from Hospital (ED 2/12/24 left arm pain, R/O heart issues )      HPI:    ED follow up:  Patient is here to follow up after going to the emergency room for left arm pain. Troponin/EKG were reassuring troponins are flat. Patient had no significant chest pain at this time. Patient has overall low low moderate risk heart score.  He is  doing better.  The problem has resolved at this time.  To summarize, patient was seen with complaints of right arm pain.  ED course and record reviewed.  Patient does not have any further complaints or concerns today. He'll see his cardiologist next month.    Medication refill  Patient medications for chronic and stable conditions are being refilled today. Patient denies any symptoms or side effects of medications.    Health Maintenance  Patient's care gaps were not addressed in this visit.    ROS negative unless otherwise noted      Vitals:  Blood pressure 111/73, pulse 66, temperature 96.9 °F (36.1 °C), resp. rate 16, height 1.651 m (5' 5\"), weight 70.8 kg (156 lb), SpO2 94 %.  Wt Readings from Last 3 Encounters:   02/27/24 70.8 kg (156 lb)   02/12/24 70.3 kg (155 lb)   01/22/24 72.1 kg (159 lb)       Physical Exam  Constitutional:       General: He is not in acute distress.     Appearance: Normal appearance. He is not ill-appearing.   HENT:      Head: Normocephalic and atraumatic.      Nose: No congestion.   Eyes:      General: No scleral icterus.     Conjunctiva/sclera: Conjunctivae normal.   Cardiovascular:      Rate and Rhythm: Normal rate and regular rhythm.      Pulses: Normal pulses.      Heart sounds: Normal heart sounds.   Pulmonary:      Effort: Pulmonary effort is normal. No respiratory distress.      Breath sounds: Normal breath sounds. No stridor. No wheezing.   Abdominal:      General: Bowel sounds are normal. There is no distension.

## 2024-03-18 ENCOUNTER — TELEPHONE (OUTPATIENT)
Dept: CARDIOLOGY CLINIC | Age: 89
End: 2024-03-18

## 2024-03-19 ENCOUNTER — OFFICE VISIT (OUTPATIENT)
Dept: CARDIOLOGY CLINIC | Age: 89
End: 2024-03-19
Payer: MEDICARE

## 2024-03-19 VITALS
BODY MASS INDEX: 26.52 KG/M2 | RESPIRATION RATE: 16 BRPM | OXYGEN SATURATION: 98 % | HEIGHT: 65 IN | WEIGHT: 159.2 LBS | HEART RATE: 79 BPM | SYSTOLIC BLOOD PRESSURE: 110 MMHG | DIASTOLIC BLOOD PRESSURE: 60 MMHG

## 2024-03-19 DIAGNOSIS — I25.10 CORONARY ARTERY DISEASE INVOLVING NATIVE CORONARY ARTERY OF NATIVE HEART WITHOUT ANGINA PECTORIS: Primary | ICD-10-CM

## 2024-03-19 DIAGNOSIS — E78.2 MIXED HYPERLIPIDEMIA: Chronic | ICD-10-CM

## 2024-03-19 DIAGNOSIS — I10 ESSENTIAL HYPERTENSION: Chronic | ICD-10-CM

## 2024-03-19 DIAGNOSIS — N18.31 STAGE 3A CHRONIC KIDNEY DISEASE (HCC): Chronic | ICD-10-CM

## 2024-03-19 DIAGNOSIS — I44.0 FIRST-DEGREE ATRIOVENTRICULAR BLOCK: ICD-10-CM

## 2024-03-19 PROCEDURE — 93000 ELECTROCARDIOGRAM COMPLETE: CPT | Performed by: INTERNAL MEDICINE

## 2024-03-19 PROCEDURE — 1123F ACP DISCUSS/DSCN MKR DOCD: CPT | Performed by: INTERNAL MEDICINE

## 2024-03-19 PROCEDURE — 99215 OFFICE O/P EST HI 40 MIN: CPT | Performed by: INTERNAL MEDICINE

## 2024-03-19 NOTE — PROGRESS NOTES
mouth daily 90 tablet 3    metoprolol (LOPRESSOR) 100 MG tablet Take 1 tablet by mouth 2 times daily 180 tablet 3    Multiple Vitamin (MULTI-VITAMIN DAILY) TABS Take 1 tablet by mouth daily 30 tablet 3    aspirin 81 MG EC tablet Take 1 tablet by mouth daily 30 tablet 5     No current facility-administered medications for this visit.         Physical Exam:  /60 (Site: Right Upper Arm, Position: Sitting, Cuff Size: Medium Adult)   Pulse 79   Resp 16   Ht 1.651 m (5' 5\")   Wt 72.2 kg (159 lb 3.2 oz)   SpO2 98%   BMI 26.49 kg/m²   Wt Readings from Last 3 Encounters:   03/19/24 72.2 kg (159 lb 3.2 oz)   02/27/24 70.8 kg (156 lb)   02/12/24 70.3 kg (155 lb)     The patient is awake, alert and in no discomfort or distress. No gross musculoskeletal deformity is present. No significant skin or nail changes are present. Gross examination of head, eyes, nose and throat are negative. Jugular venous pressure is normal and no carotid bruits are present. Normal respiratory effort is noted with no accessory muscle usage present. Lung fields are clear to ascultation. Cardiac examination is notable for a regular rate and rhythm with no palpable thrill. No gallop rhythm or cardiac murmur are identified. A benign abdominal examination is present with no masses or organomegaly. Intact pulses are present throughout all extremities and no peripheral edema is present. No focal neurologic deficits are present.    Laboratory Tests:  Lab Results   Component Value Date    CREATININE 1.8 (H) 02/12/2024    BUN 38 (H) 02/12/2024     02/12/2024    K 4.5 02/12/2024     02/12/2024    CO2 26 02/12/2024     No results found for: \"BNP\"  Lab Results   Component Value Date/Time    WBC 9.8 02/12/2024 11:22 AM    RBC 5.11 02/12/2024 11:22 AM    HGB 16.0 02/12/2024 11:22 AM    HCT 48.4 02/12/2024 11:22 AM    MCV 94.7 02/12/2024 11:22 AM    MCH 31.3 02/12/2024 11:22 AM    MCHC 33.1 02/12/2024 11:22 AM    RDW 12.4 02/12/2024 11:22 AM

## 2024-06-03 ENCOUNTER — OFFICE VISIT (OUTPATIENT)
Dept: FAMILY MEDICINE CLINIC | Age: 89
End: 2024-06-03
Payer: MEDICARE

## 2024-06-03 VITALS
DIASTOLIC BLOOD PRESSURE: 56 MMHG | HEIGHT: 65 IN | SYSTOLIC BLOOD PRESSURE: 103 MMHG | OXYGEN SATURATION: 94 % | TEMPERATURE: 97.5 F | BODY MASS INDEX: 25.83 KG/M2 | HEART RATE: 57 BPM | WEIGHT: 155 LBS | RESPIRATION RATE: 18 BRPM

## 2024-06-03 DIAGNOSIS — Z00.00 MEDICARE ANNUAL WELLNESS VISIT, SUBSEQUENT: Primary | ICD-10-CM

## 2024-06-03 DIAGNOSIS — I10 ESSENTIAL HYPERTENSION: ICD-10-CM

## 2024-06-03 PROCEDURE — G0439 PPPS, SUBSEQ VISIT: HCPCS | Performed by: STUDENT IN AN ORGANIZED HEALTH CARE EDUCATION/TRAINING PROGRAM

## 2024-06-03 PROCEDURE — 1123F ACP DISCUSS/DSCN MKR DOCD: CPT | Performed by: STUDENT IN AN ORGANIZED HEALTH CARE EDUCATION/TRAINING PROGRAM

## 2024-06-03 RX ORDER — BENAZEPRIL HYDROCHLORIDE 10 MG/1
10 TABLET ORAL DAILY
Qty: 30 TABLET | Refills: 3 | Status: SHIPPED | OUTPATIENT
Start: 2024-06-03

## 2024-06-03 ASSESSMENT — LIFESTYLE VARIABLES
HOW OFTEN DO YOU HAVE A DRINK CONTAINING ALCOHOL: NEVER
HOW MANY STANDARD DRINKS CONTAINING ALCOHOL DO YOU HAVE ON A TYPICAL DAY: PATIENT DOES NOT DRINK

## 2024-06-03 ASSESSMENT — PATIENT HEALTH QUESTIONNAIRE - PHQ9
2. FEELING DOWN, DEPRESSED OR HOPELESS: NOT AT ALL
SUM OF ALL RESPONSES TO PHQ QUESTIONS 1-9: 0
SUM OF ALL RESPONSES TO PHQ9 QUESTIONS 1 & 2: 0
SUM OF ALL RESPONSES TO PHQ QUESTIONS 1-9: 0
1. LITTLE INTEREST OR PLEASURE IN DOING THINGS: NOT AT ALL
SUM OF ALL RESPONSES TO PHQ QUESTIONS 1-9: 0
SUM OF ALL RESPONSES TO PHQ QUESTIONS 1-9: 0

## 2024-06-03 NOTE — PROGRESS NOTES
Medicare Annual Wellness Visit    Jonah Reynolds is here for Medicare AWV    Assessment & Plan   Medicare annual wellness visit, subsequent  Essential hypertension  -     benazepril (LOTENSIN) 10 MG tablet; Take 1 tablet by mouth daily, Disp-30 tablet, R-3Normal    Recommendations for Preventive Services Due: see orders and patient instructions/AVS.  Recommended screening schedule for the next 5-10 years is provided to the patient in written form: see Patient Instructions/AVS.     Return in 2 weeks (on 6/17/2024) for HTN F/U.     Subjective   The following acute and/or chronic problems were also addressed today:  Patient has chronic knee pain and would like to receive medication for pain relief.  Patient also requesting refill for his blood pressure medication.    Patient's complete Health Risk Assessment and screening values have been reviewed and are found in Flowsheets. The following problems were reviewed today and where indicated follow up appointments were made and/or referrals ordered.    No Positive Risk Factors identified today.                                  Objective   Vitals:    06/03/24 0945   BP: (!) 103/56   Site: Left Upper Arm   Position: Sitting   Cuff Size: Large Adult   Pulse: 57   Resp: 18   Temp: 97.5 °F (36.4 °C)   TempSrc: Temporal   SpO2: 94%   Weight: 70.3 kg (155 lb)   Height: 1.651 m (5' 5\")      Body mass index is 25.79 kg/m².      Physical Exam  Constitutional:       General: He is not in acute distress.     Appearance: Normal appearance. He is not ill-appearing.   HENT:      Head: Normocephalic and atraumatic.      Nose: No congestion.   Eyes:      General: No scleral icterus.     Conjunctiva/sclera: Conjunctivae normal.   Cardiovascular:      Rate and Rhythm: Normal rate and regular rhythm.      Pulses: Normal pulses.      Heart sounds: Normal heart sounds.   Pulmonary:      Effort: Pulmonary effort is normal. No respiratory distress.      Breath sounds: Normal breath sounds. No

## 2024-06-03 NOTE — PATIENT INSTRUCTIONS

## 2024-06-03 NOTE — PROGRESS NOTES
S: 89 y.o. male presents today for Medicare AWV    Chronic knee pain; wants no intervention just pain relief    Low BP; has been taking increased benazepril from 10mg BID to 20mg BID    O: VS: BP (!) 103/56 (Site: Left Upper Arm, Position: Sitting, Cuff Size: Large Adult)   Pulse 57   Temp 97.5 °F (36.4 °C) (Temporal)   Resp 18   Ht 1.651 m (5' 5\")   Wt 70.3 kg (155 lb)   SpO2 94%   BMI 25.79 kg/m²   AAO/NAD, appropriate affect for mood  CV:  RRR, no murmur  Resp: CTAB  MSK: b/l tender m/l joint space R>L    Assessment/Plan:   1) AWV  2) knee pain / OA - tylenol arthritis for now  3) HTN with recent hypotension - benazepril 10mg daily and f/u for repeat **  RTO: BP fu      Attending Physician Statement  I have discussed the case, including pertinent history and exam findings with the resident.  I agree with the documented assessment and plan.      Electronically signed by Lyssa Harper MD on 6/3/2024 at 11:04 AM

## 2024-06-14 ENCOUNTER — OFFICE VISIT (OUTPATIENT)
Dept: FAMILY MEDICINE CLINIC | Age: 89
End: 2024-06-14
Payer: MEDICARE

## 2024-06-14 VITALS
SYSTOLIC BLOOD PRESSURE: 132 MMHG | RESPIRATION RATE: 18 BRPM | TEMPERATURE: 98 F | BODY MASS INDEX: 25.83 KG/M2 | HEIGHT: 65 IN | WEIGHT: 155 LBS | OXYGEN SATURATION: 94 % | DIASTOLIC BLOOD PRESSURE: 63 MMHG | HEART RATE: 74 BPM

## 2024-06-14 DIAGNOSIS — I10 ESSENTIAL HYPERTENSION: Primary | ICD-10-CM

## 2024-06-14 DIAGNOSIS — N18.30 STAGE 3 CHRONIC KIDNEY DISEASE, UNSPECIFIED WHETHER STAGE 3A OR 3B CKD (HCC): ICD-10-CM

## 2024-06-14 LAB
ANION GAP SERPL CALCULATED.3IONS-SCNC: 11 MMOL/L (ref 7–16)
BUN BLDV-MCNC: 40 MG/DL (ref 6–23)
CALCIUM SERPL-MCNC: 9.3 MG/DL (ref 8.6–10.2)
CHLORIDE BLD-SCNC: 101 MMOL/L (ref 98–107)
CO2: 24 MMOL/L (ref 22–29)
CREAT SERPL-MCNC: 1.8 MG/DL (ref 0.7–1.2)
GFR, ESTIMATED: 37 ML/MIN/1.73M2
GLUCOSE BLD-MCNC: 188 MG/DL (ref 74–99)
POTASSIUM SERPL-SCNC: 4.5 MMOL/L (ref 3.5–5)
SODIUM BLD-SCNC: 136 MMOL/L (ref 132–146)

## 2024-06-14 PROCEDURE — 36415 COLL VENOUS BLD VENIPUNCTURE: CPT | Performed by: FAMILY MEDICINE

## 2024-06-14 PROCEDURE — 99213 OFFICE O/P EST LOW 20 MIN: CPT | Performed by: STUDENT IN AN ORGANIZED HEALTH CARE EDUCATION/TRAINING PROGRAM

## 2024-06-14 PROCEDURE — 1123F ACP DISCUSS/DSCN MKR DOCD: CPT | Performed by: STUDENT IN AN ORGANIZED HEALTH CARE EDUCATION/TRAINING PROGRAM

## 2024-06-14 NOTE — PROGRESS NOTES
Attending Physician Statement    S:   Chief Complaint   Patient presents with    Hypertension     2 Week Follow Up      Here for follow up of hypertension.     Bp had been low . He is on amlodipine, hctz, benazepril. Benazepril was reduced to 10mg. Today. His bp has been in the 110-140 range  . Denies any symptoms.   O: Blood pressure 132/63, pulse 74, temperature 98 °F (36.7 °C), temperature source Temporal, resp. rate 18, height 1.651 m (5' 5\"), weight 70.3 kg (155 lb), SpO2 94 %.   Exam:   Heart - RRR   Lungs - clear     A: Hypertension   P:  Continue current meds.    Follow-up as ordered    Attending Attestation   I have discussed the case, including pertinent history and exam findings with the resident. I agree with the documented assessment and plan.         
extended release tablet Take 1 tablet by mouth daily 90 tablet 3    metoprolol (LOPRESSOR) 100 MG tablet Take 1 tablet by mouth 2 times daily 180 tablet 3    Multiple Vitamin (MULTI-VITAMIN DAILY) TABS Take 1 tablet by mouth daily 30 tablet 3    aspirin 81 MG EC tablet Take 1 tablet by mouth daily 30 tablet 5     No current facility-administered medications on file prior to visit.         Vitals:  Blood pressure 132/63, pulse 74, temperature 98 °F (36.7 °C), temperature source Temporal, resp. rate 18, height 1.651 m (5' 5\"), weight 70.3 kg (155 lb), SpO2 94 %.  Wt Readings from Last 3 Encounters:   06/14/24 70.3 kg (155 lb)   06/03/24 70.3 kg (155 lb)   03/19/24 72.2 kg (159 lb 3.2 oz)       PE:  Physical Exam  Constitutional:       General: He is not in acute distress.     Appearance: Normal appearance. He is not ill-appearing.   HENT:      Head: Normocephalic and atraumatic.      Nose: No congestion.   Eyes:      General: No scleral icterus.     Conjunctiva/sclera: Conjunctivae normal.   Cardiovascular:      Rate and Rhythm: Normal rate and regular rhythm.      Pulses: Normal pulses.      Heart sounds: Normal heart sounds.   Pulmonary:      Effort: Pulmonary effort is normal. No respiratory distress.      Breath sounds: Normal breath sounds. No stridor. No wheezing.   Abdominal:      General: Bowel sounds are normal. There is no distension.      Palpations: Abdomen is soft. There is no mass.      Tenderness: There is no abdominal tenderness.   Musculoskeletal:         General: No swelling or tenderness. Normal range of motion.      Cervical back: Normal range of motion and neck supple.      Right lower leg: No edema.      Left lower leg: No edema.   Skin:     Coloration: Skin is not jaundiced.      Findings: No rash.   Neurological:      General: No focal deficit present.      Mental Status: He is alert and oriented to person, place, and time.      Cranial Nerves: No cranial nerve deficit.      Sensory: No sensory

## 2024-09-16 ENCOUNTER — OFFICE VISIT (OUTPATIENT)
Dept: FAMILY MEDICINE CLINIC | Age: 89
End: 2024-09-16
Payer: MEDICARE

## 2024-09-16 VITALS
DIASTOLIC BLOOD PRESSURE: 62 MMHG | RESPIRATION RATE: 18 BRPM | BODY MASS INDEX: 25.07 KG/M2 | TEMPERATURE: 97.7 F | HEIGHT: 66 IN | SYSTOLIC BLOOD PRESSURE: 122 MMHG | HEART RATE: 56 BPM | WEIGHT: 156 LBS | OXYGEN SATURATION: 96 %

## 2024-09-16 DIAGNOSIS — Z23 NEED FOR VACCINATION: ICD-10-CM

## 2024-09-16 DIAGNOSIS — I25.10 CORONARY ARTERY DISEASE INVOLVING NATIVE HEART WITHOUT ANGINA PECTORIS, UNSPECIFIED VESSEL OR LESION TYPE: ICD-10-CM

## 2024-09-16 DIAGNOSIS — E78.2 MIXED HYPERLIPIDEMIA: Chronic | ICD-10-CM

## 2024-09-16 DIAGNOSIS — I1A.0 RESISTANT HYPERTENSION: Primary | ICD-10-CM

## 2024-09-16 PROBLEM — I10 ESSENTIAL HYPERTENSION: Status: RESOLVED | Noted: 2017-05-15 | Resolved: 2024-09-16

## 2024-09-16 PROBLEM — I10 ESSENTIAL HYPERTENSION: Status: ACTIVE | Noted: 2017-05-15

## 2024-09-16 PROCEDURE — G0008 ADMIN INFLUENZA VIRUS VAC: HCPCS | Performed by: FAMILY MEDICINE

## 2024-09-16 PROCEDURE — 90653 IIV ADJUVANT VACCINE IM: CPT | Performed by: FAMILY MEDICINE

## 2024-09-16 RX ORDER — BENAZEPRIL HYDROCHLORIDE 10 MG/1
10 TABLET ORAL DAILY
Qty: 90 TABLET | Refills: 3 | Status: SHIPPED | OUTPATIENT
Start: 2024-09-16 | End: 2025-09-11

## 2024-09-16 RX ORDER — ISOSORBIDE MONONITRATE 60 MG/1
60 TABLET, EXTENDED RELEASE ORAL DAILY
Qty: 90 TABLET | Refills: 3 | Status: SHIPPED | OUTPATIENT
Start: 2024-09-16 | End: 2025-09-11

## 2024-09-16 RX ORDER — METOPROLOL TARTRATE 100 MG
100 TABLET ORAL 2 TIMES DAILY
Qty: 180 TABLET | Refills: 3 | Status: SHIPPED | OUTPATIENT
Start: 2024-09-16 | End: 2025-09-11

## 2024-09-16 RX ORDER — ASPIRIN 81 MG/1
81 TABLET ORAL DAILY
Qty: 90 TABLET | Refills: 3 | Status: SHIPPED | OUTPATIENT
Start: 2024-09-16 | End: 2025-09-11

## 2024-09-16 RX ORDER — ATORVASTATIN CALCIUM 40 MG/1
40 TABLET, FILM COATED ORAL DAILY
Qty: 90 TABLET | Refills: 3 | Status: SHIPPED | OUTPATIENT
Start: 2024-09-16 | End: 2025-09-11

## 2024-09-16 RX ORDER — AMLODIPINE BESYLATE 5 MG/1
5 TABLET ORAL EVERY EVENING
Qty: 90 TABLET | Refills: 3 | Status: SHIPPED | OUTPATIENT
Start: 2024-09-16 | End: 2025-09-11

## 2024-09-16 RX ORDER — HYDROCHLOROTHIAZIDE 12.5 MG/1
12.5 CAPSULE ORAL EVERY MORNING
Qty: 90 CAPSULE | Refills: 3 | Status: SHIPPED | OUTPATIENT
Start: 2024-09-16 | End: 2025-09-11

## 2024-11-21 ENCOUNTER — TELEPHONE (OUTPATIENT)
Dept: ADMINISTRATIVE | Age: 88
End: 2024-11-21

## 2025-03-17 ENCOUNTER — OFFICE VISIT (OUTPATIENT)
Dept: FAMILY MEDICINE CLINIC | Age: 89
End: 2025-03-17
Payer: MEDICARE

## 2025-03-17 VITALS
HEART RATE: 58 BPM | BODY MASS INDEX: 25.55 KG/M2 | WEIGHT: 159 LBS | RESPIRATION RATE: 16 BRPM | DIASTOLIC BLOOD PRESSURE: 63 MMHG | OXYGEN SATURATION: 97 % | HEIGHT: 66 IN | SYSTOLIC BLOOD PRESSURE: 129 MMHG | TEMPERATURE: 97.7 F

## 2025-03-17 DIAGNOSIS — I25.10 CORONARY ARTERY DISEASE INVOLVING NATIVE HEART WITHOUT ANGINA PECTORIS, UNSPECIFIED VESSEL OR LESION TYPE: ICD-10-CM

## 2025-03-17 DIAGNOSIS — N18.30 STAGE 3 CHRONIC KIDNEY DISEASE, UNSPECIFIED WHETHER STAGE 3A OR 3B CKD (HCC): ICD-10-CM

## 2025-03-17 DIAGNOSIS — I1A.0 RESISTANT HYPERTENSION: Primary | ICD-10-CM

## 2025-03-17 DIAGNOSIS — E78.2 MIXED HYPERLIPIDEMIA: Chronic | ICD-10-CM

## 2025-03-17 LAB
ALBUMIN: 4.4 G/DL (ref 3.5–5.2)
ALP BLD-CCNC: 104 U/L (ref 40–129)
ALT SERPL-CCNC: 33 U/L (ref 0–40)
ANION GAP SERPL CALCULATED.3IONS-SCNC: 16 MMOL/L (ref 7–16)
AST SERPL-CCNC: 36 U/L (ref 0–39)
BILIRUB SERPL-MCNC: 0.5 MG/DL (ref 0–1.2)
BUN BLDV-MCNC: 32 MG/DL (ref 6–23)
CALCIUM SERPL-MCNC: 9.6 MG/DL (ref 8.6–10.2)
CHLORIDE BLD-SCNC: 101 MMOL/L (ref 98–107)
CHOLESTEROL, TOTAL: 165 MG/DL
CO2: 21 MMOL/L (ref 22–29)
CREAT SERPL-MCNC: 1.6 MG/DL (ref 0.7–1.2)
GFR, ESTIMATED: 40 ML/MIN/1.73M2
GLUCOSE BLD-MCNC: 99 MG/DL (ref 74–99)
HDLC SERPL-MCNC: 50 MG/DL
LDL CHOLESTEROL: 70 MG/DL
MAGNESIUM: 2.1 MG/DL (ref 1.6–2.6)
POTASSIUM SERPL-SCNC: 4.8 MMOL/L (ref 3.5–5)
SODIUM BLD-SCNC: 138 MMOL/L (ref 132–146)
TOTAL PROTEIN: 7 G/DL (ref 6.4–8.3)
TRIGL SERPL-MCNC: 227 MG/DL
VLDLC SERPL CALC-MCNC: 45 MG/DL

## 2025-03-17 PROCEDURE — 99213 OFFICE O/P EST LOW 20 MIN: CPT

## 2025-03-17 PROCEDURE — G2211 COMPLEX E/M VISIT ADD ON: HCPCS

## 2025-03-17 PROCEDURE — 1123F ACP DISCUSS/DSCN MKR DOCD: CPT

## 2025-03-17 RX ORDER — ANTIOX #8/OM3/DHA/EPA/LUT/ZEAX 250-2.5 MG
2 CAPSULE ORAL DAILY
COMMUNITY

## 2025-03-17 RX ORDER — NITROGLYCERIN 0.4 MG/1
0.4 TABLET SUBLINGUAL EVERY 5 MIN PRN
Qty: 25 TABLET | Refills: 3 | Status: SHIPPED | OUTPATIENT
Start: 2025-03-17

## 2025-03-17 SDOH — ECONOMIC STABILITY: FOOD INSECURITY: WITHIN THE PAST 12 MONTHS, YOU WORRIED THAT YOUR FOOD WOULD RUN OUT BEFORE YOU GOT MONEY TO BUY MORE.: NEVER TRUE

## 2025-03-17 SDOH — ECONOMIC STABILITY: FOOD INSECURITY: WITHIN THE PAST 12 MONTHS, THE FOOD YOU BOUGHT JUST DIDN'T LAST AND YOU DIDN'T HAVE MONEY TO GET MORE.: NEVER TRUE

## 2025-03-17 ASSESSMENT — PATIENT HEALTH QUESTIONNAIRE - PHQ9
SUM OF ALL RESPONSES TO PHQ QUESTIONS 1-9: 0
1. LITTLE INTEREST OR PLEASURE IN DOING THINGS: NOT AT ALL
2. FEELING DOWN, DEPRESSED OR HOPELESS: NOT AT ALL

## 2025-03-17 NOTE — PROGRESS NOTES
S: 89 y.o. male here for HTN. Controlled.     O: VS: /63   Pulse 58   Temp 97.7 °F (36.5 °C) (Temporal)   Resp 16   Ht 1.676 m (5' 6\")   Wt 72.1 kg (159 lb)   SpO2 97%   BMI 25.66 kg/m²    General: NAD, alert and interacting appropriately.    CV:  RRR, no gallops, rubs, or murmurs    Resp: CTAB   Abd:  Soft, nontender   Ext:  No edema    Impression: HTN  Plan:   Cpm  Cmp     Attending Physician Statement  I have discussed the case, including pertinent history and exam findings with the resident.  I agree with the documented assessment and plan.

## 2025-03-17 NOTE — PROGRESS NOTES
[facility]  FAMILY MEDICINE RESIDENCY PROGRAM  DATE OF VISIT : 2025    Patient : Jonah Reynolds   Age : 89 y.o.    : 1935   MRN : [id number]   ______________________________________________________________________    Assessment & Plan :    1. Resistant hypertension  Overview:  Hypertension Medications       ACE Inhibitors       benazepril (LOTENSIN) 10 MG tablet Take 1 tablet by mouth daily       Calcium Channel Blockers       amLODIPine (NORVASC) 5 MG tablet Take 1 tablet by mouth every evening       Thiazides and Thiazide-Like Diuretics       hydroCHLOROthiazide 12.5 MG capsule Take 1 capsule by mouth every morning       Beta Blockers Cardio-Selective       metoprolol (LOPRESSOR) 100 MG tablet Take 1 tablet by mouth 2 times daily           Follows with Dr. Lucio  Orders:  -     Comprehensive Metabolic Panel  -     Magnesium  2. Stage 3 chronic kidney disease, unspecified whether stage 3a or 3b CKD (HCC)  -     Comprehensive Metabolic Panel  3. Mixed hyperlipidemia  -     Lipid Panel  4. Coronary artery disease involving native heart without angina pectoris, unspecified vessel or lesion type  Overview:  Stented several times.  Previous MI  Orders:  -     nitroGLYCERIN (NITROSTAT) 0.4 MG SL tablet; Place 1 tablet under the tongue every 5 minutes as needed for Chest pain up to max of 3 total doses. If no relief after 1 dose, call 911., Disp-25 tablet, R-3Normal      Discussed labs ordered today  Not necessarily need to act on any  Ensure no big jump in any of the values  Patient understands    Return to Office: 6 months for FERNANDO Riley MD  Case discussed with Dr. Smith    ______________________________________________________________________    Chief Complaint :   Chief Complaint   Patient presents with    Check-Up       HPI : Jonah Reynolds is 89 y.o. male who presented to the clinic today for follow up of chronic conditions.    Here for f/u on HTN and HLD  Forgot log

## 2025-03-18 ENCOUNTER — RESULTS FOLLOW-UP (OUTPATIENT)
Dept: FAMILY MEDICINE CLINIC | Age: 89
End: 2025-03-18

## 2025-04-14 ENCOUNTER — OFFICE VISIT (OUTPATIENT)
Dept: CARDIOLOGY CLINIC | Age: 89
End: 2025-04-14
Payer: MEDICARE

## 2025-04-14 VITALS
HEART RATE: 79 BPM | SYSTOLIC BLOOD PRESSURE: 124 MMHG | RESPIRATION RATE: 18 BRPM | BODY MASS INDEX: 25.55 KG/M2 | HEIGHT: 66 IN | WEIGHT: 159 LBS | DIASTOLIC BLOOD PRESSURE: 62 MMHG

## 2025-04-14 DIAGNOSIS — E78.2 MIXED HYPERLIPIDEMIA: Chronic | ICD-10-CM

## 2025-04-14 DIAGNOSIS — N18.31 STAGE 3A CHRONIC KIDNEY DISEASE (HCC): Chronic | ICD-10-CM

## 2025-04-14 DIAGNOSIS — I44.0 FIRST-DEGREE ATRIOVENTRICULAR BLOCK: ICD-10-CM

## 2025-04-14 DIAGNOSIS — I25.10 CORONARY ARTERY DISEASE INVOLVING NATIVE CORONARY ARTERY OF NATIVE HEART WITHOUT ANGINA PECTORIS: Primary | ICD-10-CM

## 2025-04-14 PROCEDURE — 1123F ACP DISCUSS/DSCN MKR DOCD: CPT | Performed by: INTERNAL MEDICINE

## 2025-04-14 PROCEDURE — 99214 OFFICE O/P EST MOD 30 MIN: CPT | Performed by: INTERNAL MEDICINE

## 2025-04-14 PROCEDURE — 1159F MED LIST DOCD IN RCRD: CPT | Performed by: INTERNAL MEDICINE

## 2025-04-14 PROCEDURE — 93000 ELECTROCARDIOGRAM COMPLETE: CPT | Performed by: INTERNAL MEDICINE

## 2025-04-14 NOTE — PROGRESS NOTES
The patient is awake, alert and in no discomfort or distress. No gross musculoskeletal deformity is present. No significant skin or nail changes are present. Gross examination of head, eyes, nose and throat are negative. Jugular venous pressure is normal and no carotid bruits are present. Normal respiratory effort is noted with no accessory muscle usage present. Lung fields are clear to ascultation. Cardiac examination is notable for a regular rate and rhythm with no palpable thrill. No gallop rhythm or cardiac murmur are identified. A benign abdominal examination is present with no masses or organomegaly. Intact pulses are present throughout all extremities and no peripheral edema is present. No focal neurologic deficits are present.    Laboratory Tests:  Lab Results   Component Value Date    CREATININE 1.6 (H) 03/17/2025    BUN 32 (H) 03/17/2025     03/17/2025    K 4.8 03/17/2025     03/17/2025    CO2 21 (L) 03/17/2025     No results found for: \"BNP\"  Lab Results   Component Value Date/Time    WBC 9.8 02/12/2024 11:22 AM    RBC 5.11 02/12/2024 11:22 AM    HGB 16.0 02/12/2024 11:22 AM    HCT 48.4 02/12/2024 11:22 AM    MCV 94.7 02/12/2024 11:22 AM    MCH 31.3 02/12/2024 11:22 AM    MCHC 33.1 02/12/2024 11:22 AM    RDW 12.4 02/12/2024 11:22 AM     02/12/2024 11:22 AM    MPV 10.7 02/12/2024 11:22 AM     No results for input(s): \"ALKPHOS\", \"ALT\", \"AST\", \"BILITOT\", \"BILIDIR\", \"LABALBU\" in the last 72 hours.    Invalid input(s): \"PROT\"  Lab Results   Component Value Date/Time    MG 2.1 03/17/2025 02:46 PM     No results found for: \"PROTIME\", \"INR\"  Lab Results   Component Value Date/Time    TSH 2.770 01/20/2020 02:38 PM     No components found for: \"CHLPL\"  Lab Results   Component Value Date    TRIG 227 (H) 03/17/2025    TRIG 220 (H) 10/20/2023    TRIG 237 (H) 04/14/2023     Lab Results   Component Value Date    HDL 50 03/17/2025    HDL 47 10/20/2023    HDL 50 04/14/2023     No components found

## (undated) DEVICE — DEFENDO AIR WATER SUCTION AND BIOPSY VALVE KIT FOR  OLYMPUS: Brand: DEFENDO AIR/WATER/SUCTION AND BIOPSY VALVE

## (undated) DEVICE — Z DISCONTINUED NO SUB IDED TUBING ETCO2 AD L6.5FT NSL ORAL CVD PRNG NONFLARED TIP OVR

## (undated) DEVICE — CONNECTOR IRRIGATION AUXILIARY H2O JET W/ PRT MTL THRD HYDR

## (undated) DEVICE — GAUZE,SPONGE,POST-OP,4X3,STRL,LF: Brand: MEDLINE

## (undated) DEVICE — FORCEPS BX L240CM JAW DIA2.4MM ORNG L CAP W/ NDL DISP RAD

## (undated) DEVICE — CONTAINER SPEC 60ML PH 7NEUTRAL BUFF FRMLN RDY TO USE